# Patient Record
Sex: MALE | Race: WHITE | NOT HISPANIC OR LATINO | Employment: OTHER | URBAN - METROPOLITAN AREA
[De-identification: names, ages, dates, MRNs, and addresses within clinical notes are randomized per-mention and may not be internally consistent; named-entity substitution may affect disease eponyms.]

---

## 2019-01-15 ENCOUNTER — HOSPITAL ENCOUNTER (EMERGENCY)
Facility: HOSPITAL | Age: 69
Discharge: HOME | End: 2019-01-15
Attending: EMERGENCY MEDICINE | Admitting: EMERGENCY MEDICINE

## 2019-01-15 VITALS
RESPIRATION RATE: 20 BRPM | HEART RATE: 88 BPM | HEIGHT: 70 IN | DIASTOLIC BLOOD PRESSURE: 81 MMHG | OXYGEN SATURATION: 92 % | BODY MASS INDEX: 25.05 KG/M2 | WEIGHT: 175 LBS | SYSTOLIC BLOOD PRESSURE: 129 MMHG | TEMPERATURE: 99.2 F

## 2019-01-15 DIAGNOSIS — N41.1 ACUTE ON CHRONIC PROSTATITIS: Primary | ICD-10-CM

## 2019-01-15 DIAGNOSIS — N41.0 ACUTE ON CHRONIC PROSTATITIS: Primary | ICD-10-CM

## 2019-01-15 DIAGNOSIS — R03.0 ELEVATED BLOOD PRESSURE READING: ICD-10-CM

## 2019-01-15 DIAGNOSIS — R30.0 DYSURIA: ICD-10-CM

## 2019-01-15 LAB
BACTERIA UR QL AUTO: ABNORMAL /HPF
BILIRUB UR QL STRIP: NEGATIVE
BUN BLDA-MCNC: 23 MG/DL (ref 8–26)
CA-I BLDA-SCNC: 1.22 MMOL/L (ref 1.2–1.32)
CHLORIDE BLDA-SCNC: 99 MMOL/L (ref 98–109)
CLARITY UR: ABNORMAL
CO2 BLDA-SCNC: 27 MMOL/L (ref 24–29)
COLOR UR: ABNORMAL
CREAT BLDA-MCNC: 1.3 MG/DL (ref 0.6–1.3)
GLUCOSE BLDC GLUCOMTR-MCNC: 143 MG/DL (ref 70–130)
GLUCOSE UR STRIP-MCNC: NEGATIVE MG/DL
HCT VFR BLDA CALC: 53 % (ref 38–51)
HGB BLDA-MCNC: 18 G/DL (ref 12–17)
HGB UR QL STRIP.AUTO: ABNORMAL
HYALINE CASTS UR QL AUTO: ABNORMAL /LPF
KETONES UR QL STRIP: ABNORMAL
LEUKOCYTE ESTERASE UR QL STRIP.AUTO: ABNORMAL
NITRITE UR QL STRIP: NEGATIVE
PH UR STRIP.AUTO: <=5 [PH] (ref 5–8)
POTASSIUM BLDA-SCNC: 4.2 MMOL/L (ref 3.5–4.9)
PROT UR QL STRIP: ABNORMAL
RBC # UR: ABNORMAL /HPF
REF LAB TEST METHOD: ABNORMAL
SODIUM BLDA-SCNC: 140 MMOL/L (ref 138–146)
SP GR UR STRIP: 1.03 (ref 1–1.03)
SQUAMOUS #/AREA URNS HPF: ABNORMAL /HPF
UROBILINOGEN UR QL STRIP: ABNORMAL
WBC UR QL AUTO: ABNORMAL /HPF

## 2019-01-15 PROCEDURE — 96374 THER/PROPH/DIAG INJ IV PUSH: CPT

## 2019-01-15 PROCEDURE — 96375 TX/PRO/DX INJ NEW DRUG ADDON: CPT

## 2019-01-15 PROCEDURE — 85014 HEMATOCRIT: CPT

## 2019-01-15 PROCEDURE — 87186 SC STD MICRODIL/AGAR DIL: CPT | Performed by: EMERGENCY MEDICINE

## 2019-01-15 PROCEDURE — 99283 EMERGENCY DEPT VISIT LOW MDM: CPT

## 2019-01-15 PROCEDURE — 87077 CULTURE AEROBIC IDENTIFY: CPT | Performed by: EMERGENCY MEDICINE

## 2019-01-15 PROCEDURE — 25010000002 KETOROLAC TROMETHAMINE PER 15 MG: Performed by: EMERGENCY MEDICINE

## 2019-01-15 PROCEDURE — 80047 BASIC METABLC PNL IONIZED CA: CPT

## 2019-01-15 PROCEDURE — 25010000002 CEFTRIAXONE PER 250 MG: Performed by: EMERGENCY MEDICINE

## 2019-01-15 PROCEDURE — 87086 URINE CULTURE/COLONY COUNT: CPT | Performed by: EMERGENCY MEDICINE

## 2019-01-15 PROCEDURE — 81001 URINALYSIS AUTO W/SCOPE: CPT | Performed by: EMERGENCY MEDICINE

## 2019-01-15 RX ORDER — SODIUM CHLORIDE 0.9 % (FLUSH) 0.9 %
10 SYRINGE (ML) INJECTION AS NEEDED
Status: DISCONTINUED | OUTPATIENT
Start: 2019-01-15 | End: 2019-01-15 | Stop reason: HOSPADM

## 2019-01-15 RX ORDER — OMEPRAZOLE 20 MG/1
20 CAPSULE, DELAYED RELEASE ORAL 2 TIMES DAILY
COMMUNITY
End: 2021-12-06 | Stop reason: ALTCHOICE

## 2019-01-15 RX ORDER — TADALAFIL 5 MG/1
5 TABLET ORAL DAILY PRN
COMMUNITY
End: 2021-10-06

## 2019-01-15 RX ORDER — KETOROLAC TROMETHAMINE 15 MG/ML
10 INJECTION, SOLUTION INTRAMUSCULAR; INTRAVENOUS ONCE
Status: COMPLETED | OUTPATIENT
Start: 2019-01-15 | End: 2019-01-15

## 2019-01-15 RX ORDER — CARVEDILOL 3.12 MG/1
3.12 TABLET ORAL 2 TIMES DAILY WITH MEALS
COMMUNITY
End: 2021-10-06 | Stop reason: SDUPTHER

## 2019-01-15 RX ORDER — PHENAZOPYRIDINE HYDROCHLORIDE 100 MG/1
200 TABLET, FILM COATED ORAL ONCE
Status: COMPLETED | OUTPATIENT
Start: 2019-01-15 | End: 2019-01-15

## 2019-01-15 RX ORDER — KETOROLAC TROMETHAMINE 10 MG/1
10 TABLET, FILM COATED ORAL EVERY 6 HOURS PRN
Qty: 20 TABLET | Refills: 0 | Status: SHIPPED | OUTPATIENT
Start: 2019-01-15 | End: 2021-05-28

## 2019-01-15 RX ORDER — TAMSULOSIN HYDROCHLORIDE 0.4 MG/1
0.4 CAPSULE ORAL ONCE
Status: COMPLETED | OUTPATIENT
Start: 2019-01-15 | End: 2019-01-15

## 2019-01-15 RX ORDER — PHENAZOPYRIDINE HYDROCHLORIDE 200 MG/1
200 TABLET, FILM COATED ORAL 3 TIMES DAILY PRN
Qty: 9 TABLET | Refills: 0 | Status: SHIPPED | OUTPATIENT
Start: 2019-01-15 | End: 2021-10-06

## 2019-01-15 RX ORDER — CEFTRIAXONE SODIUM 1 G/50ML
1 INJECTION, SOLUTION INTRAVENOUS ONCE
Status: COMPLETED | OUTPATIENT
Start: 2019-01-15 | End: 2019-01-15

## 2019-01-15 RX ORDER — LISINOPRIL 20 MG/1
20 TABLET ORAL DAILY
COMMUNITY
End: 2021-10-06

## 2019-01-15 RX ORDER — TRAZODONE HYDROCHLORIDE 50 MG/1
100 TABLET ORAL NIGHTLY
COMMUNITY
End: 2021-12-23 | Stop reason: SDUPTHER

## 2019-01-15 RX ORDER — TRAMADOL HYDROCHLORIDE 50 MG/1
100 TABLET ORAL ONCE
Status: COMPLETED | OUTPATIENT
Start: 2019-01-15 | End: 2019-01-15

## 2019-01-15 RX ORDER — ASPIRIN 81 MG/1
81 TABLET ORAL DAILY
COMMUNITY
End: 2021-10-06

## 2019-01-15 RX ORDER — TRAMADOL HYDROCHLORIDE 50 MG/1
100 TABLET ORAL EVERY 6 HOURS PRN
Qty: 10 TABLET | Refills: 0 | Status: SHIPPED | OUTPATIENT
Start: 2019-01-15 | End: 2021-05-28

## 2019-01-15 RX ADMIN — CEFTRIAXONE SODIUM 1 G: 1 INJECTION, SOLUTION INTRAVENOUS at 18:48

## 2019-01-15 RX ADMIN — PHENAZOPYRIDINE HYDROCHLORIDE 200 MG: 100 TABLET ORAL at 18:48

## 2019-01-15 RX ADMIN — TAMSULOSIN HYDROCHLORIDE 0.4 MG: 0.4 CAPSULE ORAL at 18:48

## 2019-01-15 RX ADMIN — KETOROLAC TROMETHAMINE 10 MG: 15 INJECTION, SOLUTION INTRAMUSCULAR; INTRAVENOUS at 18:56

## 2019-01-15 RX ADMIN — TRAMADOL HYDROCHLORIDE 100 MG: 50 TABLET, FILM COATED ORAL at 18:56

## 2019-01-15 NOTE — ED PROVIDER NOTES
Subjective   Mr. Ahmet Mariee is a 68-year-old male presenting to the emergency department with complaints of a suspected prostatitis flare-up. The patient has a history of prostate cancer. He states that he has had prostatitis for 28 years and frequently has flare-ups about 2-3 times per year, but this is the worst one over that entire time span. His most recent flare-up was in August 2018. The current symptoms began last night. He complains of frequency, urgency, dysuria, constant penis pain, and pain in the suprapubic region. Additionally, his pain is worsened by urinating and sitting down. When he urinates, he also has pain in his testicles, peroneum, and down his left leg. He states that he typically relieves symptoms by going to the urologist and taking antibiotics. This morning, he called his urologist and was started on Doxycycline. He had only taken one dose earlier today and called his urologist back because the pain was so intense. As a result, he was told to go to the ED. There are no other acute complaints at this time.        History provided by:  Patient  Urinary Frequency   Severity:  Severe  Onset quality:  Gradual  Duration:  1 day  Timing:  Constant  Progression:  Worsening  Chronicity:  Recurrent  Context:  Hx prostate cancer, prostatitis  Relieved by:  Nothing  Worsened by:  Sitting, urinating  Ineffective treatments:  Doxycycline  Associated symptoms: no diarrhea, no fever, no nausea and no vomiting        Review of Systems   Constitutional: Negative.  Negative for fever.   HENT: Negative.    Respiratory: Negative.    Gastrointestinal: Negative.  Negative for constipation, diarrhea, nausea and vomiting.   Genitourinary: Positive for dysuria, frequency, penile pain, testicular pain and urgency.   Musculoskeletal: Negative.    Skin: Negative.    Neurological: Negative.    Psychiatric/Behavioral: Negative.    All other systems reviewed and are negative.      History reviewed. No pertinent past  medical history.    Allergies   Allergen Reactions   • Sulfa Antibiotics Rash       History reviewed. No pertinent surgical history.    History reviewed. No pertinent family history.    Social History     Socioeconomic History   • Marital status:      Spouse name: Not on file   • Number of children: Not on file   • Years of education: Not on file   • Highest education level: Not on file   Tobacco Use   • Smoking status: Never Smoker   • Smokeless tobacco: Never Used   Substance and Sexual Activity   • Alcohol use: No     Frequency: Never   • Drug use: No   • Sexual activity: Defer         Objective   Physical Exam   Constitutional: He is oriented to person, place, and time. He appears well-developed and well-nourished. No distress.   Cardiovascular: Normal rate, regular rhythm, normal heart sounds and intact distal pulses.   No murmur heard.  Pulmonary/Chest: Effort normal and breath sounds normal. No respiratory distress.   Abdominal: Soft. Bowel sounds are normal. There is tenderness in the suprapubic area. There is no rigidity and no guarding.   Mild tenderness to deep palpation of the suprapubic region.   Musculoskeletal: Normal range of motion.   Neurological: He is alert and oriented to person, place, and time.   Skin: Skin is warm and dry.   Psychiatric: He has a normal mood and affect. His behavior is normal.   Nursing note and vitals reviewed.      Procedures         ED Course  ED Course as of Jan 16 0015 Tue Wilbert 15, 2019   1835 Leuk Esterase, UA: (!) Moderate (2+) [RS]   1835 WBC, UA: (!) Too Numerous to Count [RS]   1835 Bacteria, UA: (!) 3+ [RS]   1924 Patient reports feeling much better.  We'll discharge him home to follow-up with his urologist as scheduled tomorrow.  He is to return to the ER for any worsening concerns.  He is artery on antibiotics and is to continue those.  Patient voices understanding and is happy with the plan.  [RS]      ED Course User Index  [RS] Roni Jacobs MD      Recent Results (from the past 24 hour(s))   Urinalysis With Culture If Indicated - Urine, Clean Catch    Collection Time: 01/15/19  5:11 PM   Result Value Ref Range    Color, UA Dark Yellow (A) Yellow, Straw    Appearance, UA Cloudy (A) Clear    pH, UA <=5.0 5.0 - 8.0    Specific Gravity, UA 1.029 1.001 - 1.030    Glucose, UA Negative Negative    Ketones, UA 15 mg/dL (1+) (A) Negative    Bilirubin, UA Negative Negative    Blood, UA Large (3+) (A) Negative    Protein,  mg/dL (2+) (A) Negative    Leuk Esterase, UA Moderate (2+) (A) Negative    Nitrite, UA Negative Negative    Urobilinogen, UA 1.0 E.U./dL 0.2 - 1.0 E.U./dL   Urinalysis, Microscopic Only - Urine, Clean Catch    Collection Time: 01/15/19  5:11 PM   Result Value Ref Range    RBC, UA 13-20 (A) None Seen, 0-2 /HPF    WBC, UA Too Numerous to Count (A) None Seen, 0-2 /HPF    Bacteria, UA 3+ (A) None Seen, Trace /HPF    Squamous Epithelial Cells, UA 0-2 None Seen, 0-2 /HPF    Hyaline Casts, UA 0-6 0 - 6 /LPF    Methodology Manual Light Microscopy    POC CHEM 8    Collection Time: 01/15/19  6:57 PM   Result Value Ref Range    Glucose 143 (H) 70 - 130 mg/dL    BUN 23 8 - 26 mg/dL    Creatinine 1.30 0.60 - 1.30 mg/dL    Sodium 140 138 - 146 mmol/L    Potassium 4.2 3.5 - 4.9 mmol/L    Chloride 99 98 - 109 mmol/L    Total CO2 27 24 - 29 mmol/L    Hemoglobin 18.0 (H) 12.0 - 17.0 g/dL    Hematocrit 53 (H) 38 - 51 %    Ionized Calcium 1.22 1.20 - 1.32 mmol/L     Note: In addition to lab results from this visit, the labs listed above may include labs taken at another facility or during a different encounter within the last 24 hours. Please correlate lab times with ED admission and discharge times for further clarification of the services performed during this visit.    No orders to display     Vitals:    01/15/19 1704 01/15/19 1710 01/15/19 1921 01/15/19 1944   BP: 140/92   129/81   Pulse: 120   88   Resp: 22 20     Temp: 99.2 °F (37.3 °C)      TempSrc: Oral   "    SpO2: 98%  93% 92%   Weight: 79.4 kg (175 lb)      Height: 177.8 cm (70\")        Medications   cefTRIAXone (ROCEPHIN) IVPB 1 g (0 g Intravenous Stopped 1/15/19 1857)   tamsulosin (FLOMAX) 24 hr capsule 0.4 mg (0.4 mg Oral Given 1/15/19 1848)   phenazopyridine (PYRIDIUM) tablet 200 mg (200 mg Oral Given 1/15/19 1848)   ketorolac (TORADOL) injection 10 mg (10 mg Intravenous Given 1/15/19 1856)   traMADol (ULTRAM) tablet 100 mg (100 mg Oral Given 1/15/19 1856)     ECG/EMG Results (last 24 hours)     ** No results found for the last 24 hours. **                        MDM  Number of Diagnoses or Management Options  Acute on chronic prostatitis:   Dysuria:   Elevated blood pressure reading:      Amount and/or Complexity of Data Reviewed  Clinical lab tests: reviewed  Review and summarize past medical records: yes        Final diagnoses:   Acute on chronic prostatitis   Dysuria   Elevated blood pressure reading       Documentation assistance provided by mónica Le.  Information recorded by the mónica was done at my direction and has been verified and validated by me.     Domingo Le  01/15/19 1913       Roni Jacobs MD  01/16/19 0015    "

## 2019-01-17 LAB — BACTERIA SPEC AEROBE CULT: ABNORMAL

## 2021-05-21 ENCOUNTER — APPOINTMENT (OUTPATIENT)
Dept: PREADMISSION TESTING | Facility: HOSPITAL | Age: 71
End: 2021-05-21

## 2021-05-25 ENCOUNTER — TRANSCRIBE ORDERS (OUTPATIENT)
Dept: LAB | Facility: HOSPITAL | Age: 71
End: 2021-05-25

## 2021-05-25 ENCOUNTER — LAB (OUTPATIENT)
Dept: LAB | Facility: HOSPITAL | Age: 71
End: 2021-05-25

## 2021-05-25 ENCOUNTER — APPOINTMENT (OUTPATIENT)
Dept: PREADMISSION TESTING | Facility: HOSPITAL | Age: 71
End: 2021-05-25

## 2021-05-25 DIAGNOSIS — Z01.812 PRE-OPERATIVE LABORATORY EXAMINATION: ICD-10-CM

## 2021-05-25 DIAGNOSIS — Z01.812 PRE-OPERATIVE LABORATORY EXAMINATION: Primary | ICD-10-CM

## 2021-05-25 LAB
DEPRECATED RDW RBC AUTO: 43.1 FL (ref 37–54)
ERYTHROCYTE [DISTWIDTH] IN BLOOD BY AUTOMATED COUNT: 12.3 % (ref 12.3–15.4)
HCT VFR BLD AUTO: 51 % (ref 37.5–51)
HGB BLD-MCNC: 17.3 G/DL (ref 13–17.7)
MCH RBC QN AUTO: 32.2 PG (ref 26.6–33)
MCHC RBC AUTO-ENTMCNC: 33.9 G/DL (ref 31.5–35.7)
MCV RBC AUTO: 95 FL (ref 79–97)
PLATELET # BLD AUTO: 176 10*3/MM3 (ref 140–450)
PMV BLD AUTO: 11 FL (ref 6–12)
RBC # BLD AUTO: 5.37 10*6/MM3 (ref 4.14–5.8)
SARS-COV-2 RNA PNL SPEC NAA+PROBE: NOT DETECTED
WBC # BLD AUTO: 6.15 10*3/MM3 (ref 3.4–10.8)

## 2021-05-25 PROCEDURE — C9803 HOPD COVID-19 SPEC COLLECT: HCPCS

## 2021-05-25 PROCEDURE — U0004 COV-19 TEST NON-CDC HGH THRU: HCPCS

## 2021-05-25 PROCEDURE — 36415 COLL VENOUS BLD VENIPUNCTURE: CPT

## 2021-05-25 PROCEDURE — 85027 COMPLETE CBC AUTOMATED: CPT

## 2021-05-25 PROCEDURE — 80048 BASIC METABOLIC PNL TOTAL CA: CPT

## 2021-05-26 LAB
ANION GAP SERPL CALCULATED.3IONS-SCNC: 11.1 MMOL/L (ref 5–15)
BUN SERPL-MCNC: 24 MG/DL (ref 8–23)
BUN/CREAT SERPL: 18.8 (ref 7–25)
CALCIUM SPEC-SCNC: 9.9 MG/DL (ref 8.6–10.5)
CHLORIDE SERPL-SCNC: 102 MMOL/L (ref 98–107)
CO2 SERPL-SCNC: 25.9 MMOL/L (ref 22–29)
CREAT SERPL-MCNC: 1.28 MG/DL (ref 0.76–1.27)
GFR SERPL CREATININE-BSD FRML MDRD: 56 ML/MIN/1.73
GLUCOSE SERPL-MCNC: 135 MG/DL (ref 65–99)
POTASSIUM SERPL-SCNC: 4.3 MMOL/L (ref 3.5–5.2)
SODIUM SERPL-SCNC: 139 MMOL/L (ref 136–145)

## 2021-05-28 ENCOUNTER — LAB REQUISITION (OUTPATIENT)
Dept: LAB | Facility: HOSPITAL | Age: 71
End: 2021-05-28

## 2021-05-28 ENCOUNTER — HOSPITAL ENCOUNTER (EMERGENCY)
Facility: HOSPITAL | Age: 71
Discharge: HOME | End: 2021-05-29
Attending: EMERGENCY MEDICINE | Admitting: EMERGENCY MEDICINE

## 2021-05-28 VITALS
RESPIRATION RATE: 18 BRPM | TEMPERATURE: 97.7 F | DIASTOLIC BLOOD PRESSURE: 85 MMHG | SYSTOLIC BLOOD PRESSURE: 122 MMHG | WEIGHT: 178 LBS | OXYGEN SATURATION: 97 % | HEART RATE: 88 BPM | HEIGHT: 70 IN | BODY MASS INDEX: 25.48 KG/M2

## 2021-05-28 DIAGNOSIS — Z87.438 HISTORY OF CHRONIC PROSTATITIS: ICD-10-CM

## 2021-05-28 DIAGNOSIS — N99.89 POSTOPERATIVE URINARY RETENTION: ICD-10-CM

## 2021-05-28 DIAGNOSIS — R33.8 POSTOPERATIVE URINARY RETENTION: ICD-10-CM

## 2021-05-28 DIAGNOSIS — R33.8 ACUTE URINARY RETENTION: Primary | ICD-10-CM

## 2021-05-28 DIAGNOSIS — Z87.19 S/P BILATERAL INGUINAL HERNIORRHAPHY: ICD-10-CM

## 2021-05-28 DIAGNOSIS — Z98.890 S/P BILATERAL INGUINAL HERNIORRHAPHY: ICD-10-CM

## 2021-05-28 DIAGNOSIS — K40.20 BILATERAL INGUINAL HERNIA, WITHOUT OBSTRUCTION OR GANGRENE, NOT SPECIFIED AS RECURRENT: ICD-10-CM

## 2021-05-28 LAB
ALBUMIN SERPL-MCNC: 4.3 G/DL (ref 3.5–5.2)
ALBUMIN/GLOB SERPL: 1.5 G/DL
ALP SERPL-CCNC: 48 U/L (ref 39–117)
ALT SERPL W P-5'-P-CCNC: 27 U/L (ref 1–41)
ANION GAP SERPL CALCULATED.3IONS-SCNC: 14 MMOL/L (ref 5–15)
AST SERPL-CCNC: 22 U/L (ref 1–40)
BASOPHILS # BLD AUTO: 0.01 10*3/MM3 (ref 0–0.2)
BASOPHILS NFR BLD AUTO: 0.1 % (ref 0–1.5)
BILIRUB SERPL-MCNC: 0.6 MG/DL (ref 0–1.2)
BILIRUB UR QL STRIP: NEGATIVE
BUN SERPL-MCNC: 21 MG/DL (ref 8–23)
BUN/CREAT SERPL: 16.7 (ref 7–25)
CALCIUM SPEC-SCNC: 9.1 MG/DL (ref 8.6–10.5)
CHLORIDE SERPL-SCNC: 102 MMOL/L (ref 98–107)
CLARITY UR: CLEAR
CO2 SERPL-SCNC: 22 MMOL/L (ref 22–29)
COLOR UR: YELLOW
CREAT SERPL-MCNC: 1.26 MG/DL (ref 0.76–1.27)
D-LACTATE SERPL-SCNC: 2.2 MMOL/L (ref 0.5–2)
DEPRECATED RDW RBC AUTO: 42.5 FL (ref 37–54)
EOSINOPHIL # BLD AUTO: 0 10*3/MM3 (ref 0–0.4)
EOSINOPHIL NFR BLD AUTO: 0 % (ref 0.3–6.2)
ERYTHROCYTE [DISTWIDTH] IN BLOOD BY AUTOMATED COUNT: 12.1 % (ref 12.3–15.4)
GFR SERPL CREATININE-BSD FRML MDRD: 57 ML/MIN/1.73
GLOBULIN UR ELPH-MCNC: 2.8 GM/DL
GLUCOSE SERPL-MCNC: 135 MG/DL (ref 65–99)
GLUCOSE UR STRIP-MCNC: NEGATIVE MG/DL
HCT VFR BLD AUTO: 47.9 % (ref 37.5–51)
HGB BLD-MCNC: 16.5 G/DL (ref 13–17.7)
HGB UR QL STRIP.AUTO: NEGATIVE
HOLD SPECIMEN: NORMAL
HOLD SPECIMEN: NORMAL
IMM GRANULOCYTES # BLD AUTO: 0.03 10*3/MM3 (ref 0–0.05)
IMM GRANULOCYTES NFR BLD AUTO: 0.3 % (ref 0–0.5)
KETONES UR QL STRIP: ABNORMAL
LEUKOCYTE ESTERASE UR QL STRIP.AUTO: NEGATIVE
LIPASE SERPL-CCNC: 16 U/L (ref 13–60)
LYMPHOCYTES # BLD AUTO: 0.46 10*3/MM3 (ref 0.7–3.1)
LYMPHOCYTES NFR BLD AUTO: 4.1 % (ref 19.6–45.3)
MCH RBC QN AUTO: 32.8 PG (ref 26.6–33)
MCHC RBC AUTO-ENTMCNC: 34.4 G/DL (ref 31.5–35.7)
MCV RBC AUTO: 95.2 FL (ref 79–97)
MONOCYTES # BLD AUTO: 0.39 10*3/MM3 (ref 0.1–0.9)
MONOCYTES NFR BLD AUTO: 3.5 % (ref 5–12)
NEUTROPHILS NFR BLD AUTO: 10.3 10*3/MM3 (ref 1.7–7)
NEUTROPHILS NFR BLD AUTO: 92 % (ref 42.7–76)
NITRITE UR QL STRIP: NEGATIVE
NRBC BLD AUTO-RTO: 0 /100 WBC (ref 0–0.2)
PH UR STRIP.AUTO: <=5 [PH] (ref 5–8)
PLATELET # BLD AUTO: 161 10*3/MM3 (ref 140–450)
PMV BLD AUTO: 10.5 FL (ref 6–12)
POTASSIUM SERPL-SCNC: 4.1 MMOL/L (ref 3.5–5.2)
PROT SERPL-MCNC: 7.1 G/DL (ref 6–8.5)
PROT UR QL STRIP: NEGATIVE
RBC # BLD AUTO: 5.03 10*6/MM3 (ref 4.14–5.8)
SODIUM SERPL-SCNC: 138 MMOL/L (ref 136–145)
SP GR UR STRIP: 1.02 (ref 1–1.03)
UROBILINOGEN UR QL STRIP: ABNORMAL
WBC # BLD AUTO: 11.19 10*3/MM3 (ref 3.4–10.8)
WHOLE BLOOD HOLD SPECIMEN: NORMAL
WHOLE BLOOD HOLD SPECIMEN: NORMAL

## 2021-05-28 PROCEDURE — 80053 COMPREHEN METABOLIC PANEL: CPT

## 2021-05-28 PROCEDURE — 88302 TISSUE EXAM BY PATHOLOGIST: CPT | Performed by: SURGERY

## 2021-05-28 PROCEDURE — 51798 US URINE CAPACITY MEASURE: CPT

## 2021-05-28 PROCEDURE — 83605 ASSAY OF LACTIC ACID: CPT | Performed by: EMERGENCY MEDICINE

## 2021-05-28 PROCEDURE — 99283 EMERGENCY DEPT VISIT LOW MDM: CPT

## 2021-05-28 PROCEDURE — 85025 COMPLETE CBC W/AUTO DIFF WBC: CPT

## 2021-05-28 PROCEDURE — 88304 TISSUE EXAM BY PATHOLOGIST: CPT | Performed by: SURGERY

## 2021-05-28 PROCEDURE — 81003 URINALYSIS AUTO W/O SCOPE: CPT | Performed by: EMERGENCY MEDICINE

## 2021-05-28 PROCEDURE — 51702 INSERT TEMP BLADDER CATH: CPT

## 2021-05-28 PROCEDURE — 83690 ASSAY OF LIPASE: CPT

## 2021-05-28 RX ORDER — SODIUM CHLORIDE 9 MG/ML
10 INJECTION INTRAVENOUS AS NEEDED
Status: DISCONTINUED | OUTPATIENT
Start: 2021-05-28 | End: 2021-05-29 | Stop reason: HOSPADM

## 2021-05-28 RX ORDER — OXYCODONE AND ACETAMINOPHEN 7.5; 325 MG/1; MG/1
1 TABLET ORAL ONCE
Status: COMPLETED | OUTPATIENT
Start: 2021-05-28 | End: 2021-05-29

## 2021-05-28 RX ORDER — ACETAMINOPHEN 500 MG
500 TABLET ORAL ONCE
Status: COMPLETED | OUTPATIENT
Start: 2021-05-28 | End: 2021-05-29

## 2021-05-29 RX ADMIN — ACETAMINOPHEN 500 MG: 500 TABLET, FILM COATED ORAL at 00:33

## 2021-05-29 RX ADMIN — OXYCODONE HYDROCHLORIDE AND ACETAMINOPHEN 1 TABLET: 7.5; 325 TABLET ORAL at 00:32

## 2021-06-01 LAB
CYTO UR: NORMAL
LAB AP CASE REPORT: NORMAL
LAB AP CLINICAL INFORMATION: NORMAL
PATH REPORT.FINAL DX SPEC: NORMAL
PATH REPORT.GROSS SPEC: NORMAL

## 2021-06-10 LAB — HOLD SPECIMEN: NORMAL

## 2021-10-06 ENCOUNTER — OFFICE VISIT (OUTPATIENT)
Dept: FAMILY MEDICINE CLINIC | Facility: CLINIC | Age: 71
End: 2021-10-06

## 2021-10-06 VITALS
HEIGHT: 70 IN | BODY MASS INDEX: 26.03 KG/M2 | HEART RATE: 67 BPM | DIASTOLIC BLOOD PRESSURE: 80 MMHG | OXYGEN SATURATION: 99 % | SYSTOLIC BLOOD PRESSURE: 124 MMHG | TEMPERATURE: 97.5 F | RESPIRATION RATE: 18 BRPM | WEIGHT: 181.8 LBS

## 2021-10-06 DIAGNOSIS — N52.8 OTHER MALE ERECTILE DYSFUNCTION: ICD-10-CM

## 2021-10-06 DIAGNOSIS — K86.89 PANCREATIC INSUFFICIENCY: ICD-10-CM

## 2021-10-06 DIAGNOSIS — Z13.220 SCREENING CHOLESTEROL LEVEL: ICD-10-CM

## 2021-10-06 DIAGNOSIS — N32.81 OAB (OVERACTIVE BLADDER): ICD-10-CM

## 2021-10-06 DIAGNOSIS — C61 PROSTATE CANCER (HCC): ICD-10-CM

## 2021-10-06 DIAGNOSIS — N40.1 BENIGN PROSTATIC HYPERPLASIA WITH LOWER URINARY TRACT SYMPTOMS, SYMPTOM DETAILS UNSPECIFIED: ICD-10-CM

## 2021-10-06 DIAGNOSIS — Q61.02 MULTIPLE RENAL CYSTS: ICD-10-CM

## 2021-10-06 DIAGNOSIS — K21.9 GASTROESOPHAGEAL REFLUX DISEASE WITHOUT ESOPHAGITIS: ICD-10-CM

## 2021-10-06 DIAGNOSIS — I10 PRIMARY HYPERTENSION: Primary | ICD-10-CM

## 2021-10-06 DIAGNOSIS — K44.9 HIATAL HERNIA: ICD-10-CM

## 2021-10-06 PROCEDURE — 80053 COMPREHEN METABOLIC PANEL: CPT | Performed by: STUDENT IN AN ORGANIZED HEALTH CARE EDUCATION/TRAINING PROGRAM

## 2021-10-06 PROCEDURE — 90662 IIV NO PRSV INCREASED AG IM: CPT | Performed by: STUDENT IN AN ORGANIZED HEALTH CARE EDUCATION/TRAINING PROGRAM

## 2021-10-06 PROCEDURE — G0008 ADMIN INFLUENZA VIRUS VAC: HCPCS | Performed by: STUDENT IN AN ORGANIZED HEALTH CARE EDUCATION/TRAINING PROGRAM

## 2021-10-06 PROCEDURE — 99205 OFFICE O/P NEW HI 60 MIN: CPT | Performed by: STUDENT IN AN ORGANIZED HEALTH CARE EDUCATION/TRAINING PROGRAM

## 2021-10-06 PROCEDURE — 80061 LIPID PANEL: CPT | Performed by: STUDENT IN AN ORGANIZED HEALTH CARE EDUCATION/TRAINING PROGRAM

## 2021-10-06 RX ORDER — LISINOPRIL 30 MG/1
TABLET ORAL
COMMUNITY
End: 2021-10-06 | Stop reason: SDUPTHER

## 2021-10-06 RX ORDER — TADALAFIL 5 MG/1
TABLET ORAL
COMMUNITY
End: 2021-10-06 | Stop reason: SDUPTHER

## 2021-10-06 RX ORDER — BILBERRY FRUIT 1000 MG
CAPSULE ORAL
COMMUNITY
End: 2022-07-22

## 2021-10-06 RX ORDER — BACILLUS COAGULANS/INULIN 1B-250 MG
CAPSULE ORAL
COMMUNITY
End: 2021-10-06

## 2021-10-06 RX ORDER — CHLORAL HYDRATE 500 MG
CAPSULE ORAL
COMMUNITY

## 2021-10-06 RX ORDER — CARVEDILOL 3.12 MG/1
TABLET ORAL
COMMUNITY
End: 2021-10-06

## 2021-10-06 RX ORDER — PANCRELIPASE 36000; 180000; 114000 [USP'U]/1; [USP'U]/1; [USP'U]/1
1 CAPSULE, DELAYED RELEASE PELLETS ORAL
COMMUNITY
Start: 2021-09-16 | End: 2022-04-18

## 2021-10-06 RX ORDER — TADALAFIL 5 MG/1
5 TABLET ORAL DAILY
Status: SHIPPED | COMMUNITY
Start: 2021-10-06

## 2021-10-06 RX ORDER — TRAZODONE HYDROCHLORIDE 50 MG/1
TABLET ORAL
COMMUNITY
End: 2021-10-06 | Stop reason: SDUPTHER

## 2021-10-06 RX ORDER — HYDROCHLOROTHIAZIDE 12.5 MG/1
TABLET ORAL
COMMUNITY
Start: 2021-07-22 | End: 2021-12-23 | Stop reason: SDUPTHER

## 2021-10-06 RX ORDER — LISINOPRIL 30 MG/1
TABLET ORAL
COMMUNITY
Start: 2021-08-09 | End: 2021-12-23 | Stop reason: SDUPTHER

## 2021-10-06 RX ORDER — BACILLUS COAGULANS/INULIN 1B-250 MG
CAPSULE ORAL
COMMUNITY
End: 2022-09-26

## 2021-10-06 NOTE — PATIENT INSTRUCTIONS
"Nice to meet you! Please sign records release forms.     Stop carvedilol. BP goal is 130/80. Call if over this blood pressure often.     Flu shot today. Recommend COVID vaccine booster and Shingrix.     https://www.nhlbi.nih.gov/files/docs/public/heart/dash_brief.pdf\">   DASH Eating Plan  DASH stands for Dietary Approaches to Stop Hypertension. The DASH eating plan is a healthy eating plan that has been shown to:  · Reduce high blood pressure (hypertension).  · Reduce your risk for type 2 diabetes, heart disease, and stroke.  · Help with weight loss.  What are tips for following this plan?  Reading food labels  · Check food labels for the amount of salt (sodium) per serving. Choose foods with less than 5 percent of the Daily Value of sodium. Generally, foods with less than 300 milligrams (mg) of sodium per serving fit into this eating plan.  · To find whole grains, look for the word \"whole\" as the first word in the ingredient list.  Shopping  · Buy products labeled as \"low-sodium\" or \"no salt added.\"  · Buy fresh foods. Avoid canned foods and pre-made or frozen meals.  Cooking  · Avoid adding salt when cooking. Use salt-free seasonings or herbs instead of table salt or sea salt. Check with your health care provider or pharmacist before using salt substitutes.  · Do not li foods. Cook foods using healthy methods such as baking, boiling, grilling, roasting, and broiling instead.  · Cook with heart-healthy oils, such as olive, canola, avocado, soybean, or sunflower oil.  Meal planning    · Eat a balanced diet that includes:  ? 4 or more servings of fruits and 4 or more servings of vegetables each day. Try to fill one-half of your plate with fruits and vegetables.  ? 6-8 servings of whole grains each day.  ? Less than 6 oz (170 g) of lean meat, poultry, or fish each day. A 3-oz (85-g) serving of meat is about the same size as a deck of cards. One egg equals 1 oz (28 g).  ? 2-3 servings of low-fat dairy each day. One " serving is 1 cup (237 mL).  ? 1 serving of nuts, seeds, or beans 5 times each week.  ? 2-3 servings of heart-healthy fats. Healthy fats called omega-3 fatty acids are found in foods such as walnuts, flaxseeds, fortified milks, and eggs. These fats are also found in cold-water fish, such as sardines, salmon, and mackerel.  · Limit how much you eat of:  ? Canned or prepackaged foods.  ? Food that is high in trans fat, such as some fried foods.  ? Food that is high in saturated fat, such as fatty meat.  ? Desserts and other sweets, sugary drinks, and other foods with added sugar.  ? Full-fat dairy products.  · Do not salt foods before eating.  · Do not eat more than 4 egg yolks a week.  · Try to eat at least 2 vegetarian meals a week.  · Eat more home-cooked food and less restaurant, buffet, and fast food.    Lifestyle  · When eating at a restaurant, ask that your food be prepared with less salt or no salt, if possible.  · If you drink alcohol:  ? Limit how much you use to:  § 0-1 drink a day for women who are not pregnant.  § 0-2 drinks a day for men.  ? Be aware of how much alcohol is in your drink. In the [place] one drink equals one 12 oz bottle of beer (355 mL), one 5 oz glass of wine (148 mL), or one 1½ oz glass of hard liquor (44 mL).  General information  · Avoid eating more than 2,300 mg of salt a day. If you have hypertension, you may need to reduce your sodium intake to 1,500 mg a day.  · Work with your health care provider to maintain a healthy body weight or to lose weight. Ask what an ideal weight is for you.  · Get at least 30 minutes of exercise that causes your heart to beat faster (aerobic exercise) most days of the week. Activities may include walking, swimming, or biking.  · Work with your health care provider or dietitian to adjust your eating plan to your individual calorie needs.  What foods should I eat?  Fruits  All fresh, dried, or frozen fruit. Canned fruit in natural juice (without added  sugar).  Vegetables  Fresh or frozen vegetables (raw, steamed, roasted, or grilled). Low-sodium or reduced-sodium tomato and vegetable juice. Low-sodium or reduced-sodium tomato sauce and tomato paste. Low-sodium or reduced-sodium canned vegetables.  Grains  Whole-grain or whole-wheat bread. Whole-grain or whole-wheat pasta. Brown rice. Oatmeal. Quinoa. Bulgur. Whole-grain and low-sodium cereals. Gertrude bread. Low-fat, low-sodium crackers. Whole-wheat flour tortillas.  Meats and other proteins  Skinless chicken or turkey. Ground chicken or turkey. Pork with fat trimmed off. Fish and seafood. Egg whites. Dried beans, peas, or lentils. Unsalted nuts, nut butters, and seeds. Unsalted canned beans. Lean cuts of beef with fat trimmed off. Low-sodium, lean precooked or cured meat, such as sausages or meat loaves.  Dairy  Low-fat (1%) or fat-free (skim) milk. Reduced-fat, low-fat, or fat-free cheeses. Nonfat, low-sodium ricotta or cottage cheese. Low-fat or nonfat yogurt. Low-fat, low-sodium cheese.  Fats and oils  Soft margarine without trans fats. Vegetable oil. Reduced-fat, low-fat, or light mayonnaise and salad dressings (reduced-sodium). Canola, safflower, olive, avocado, soybean, and sunflower oils. Avocado.  Seasonings and condiments  Herbs. Spices. Seasoning mixes without salt.  Other foods  Unsalted popcorn and pretzels. Fat-free sweets.  The items listed above may not be a complete list of foods and beverages you can eat. Contact a dietitian for more information.  What foods should I avoid?  Fruits  Canned fruit in a light or heavy syrup. Fried fruit. Fruit in cream or butter sauce.  Vegetables  Creamed or fried vegetables. Vegetables in a cheese sauce. Regular canned vegetables (not low-sodium or reduced-sodium). Regular canned tomato sauce and paste (not low-sodium or reduced-sodium). Regular tomato and vegetable juice (not low-sodium or reduced-sodium). Pickles. Olives.  Grains  Baked goods made with fat, such  as croissants, muffins, or some breads. Dry pasta or rice meal packs.  Meats and other proteins  Fatty cuts of meat. Ribs. Fried meat. Jean Baptiste. Bologna, salami, and other precooked or cured meats, such as sausages or meat loaves. Fat from the back of a pig (fatback). Bratwurst. Salted nuts and seeds. Canned beans with added salt. Canned or smoked fish. Whole eggs or egg yolks. Chicken or turkey with skin.  Dairy  Whole or 2% milk, cream, and half-and-half. Whole or full-fat cream cheese. Whole-fat or sweetened yogurt. Full-fat cheese. Nondairy creamers. Whipped toppings. Processed cheese and cheese spreads.  Fats and oils  Butter. Stick margarine. Lard. Shortening. Ghee. Jean Baptiste fat. Tropical oils, such as coconut, palm kernel, or palm oil.  Seasonings and condiments  Onion salt, garlic salt, seasoned salt, table salt, and sea salt. Worcestershire sauce. Tartar sauce. Barbecue sauce. Teriyaki sauce. Soy sauce, including reduced-sodium. Steak sauce. Canned and packaged gravies. Fish sauce. Oyster sauce. Cocktail sauce. Store-bought horseradish. Ketchup. Mustard. Meat flavorings and tenderizers. Bouillon cubes. Hot sauces. Pre-made or packaged marinades. Pre-made or packaged taco seasonings. Relishes. Regular salad dressings.  Other foods  Salted popcorn and pretzels.  The items listed above may not be a complete list of foods and beverages you should avoid. Contact a dietitian for more information.  Where to find more information  · National Heart, Lung, and Blood [place]: www.nhlbi.nih.gov  · American Heart Association: www.heart.org  · Academy of Nutrition and Dietetics: www.eatright.org  · National Kidney Foundation: www.kidney.org  Summary  · The DASH eating plan is a healthy eating plan that has been shown to reduce high blood pressure (hypertension). It may also reduce your risk for type 2 diabetes, heart disease, and stroke.  · When on the DASH eating plan, aim to eat more fresh fruits and vegetables, whole  grains, lean proteins, low-fat dairy, and heart-healthy fats.  · With the DASH eating plan, you should limit salt (sodium) intake to 2,300 mg a day. If you have hypertension, you may need to reduce your sodium intake to 1,500 mg a day.  · Work with your health care provider or dietitian to adjust your eating plan to your individual calorie needs.  This information is not intended to replace advice given to you by your health care provider. Make sure you discuss any questions you have with your health care provider.  Document Revised: 11/20/2020 Document Reviewed: 11/20/2020  Elsevier Patient Education © 2021 Elsevier Inc.

## 2021-10-06 NOTE — PROGRESS NOTES
New Patient Office Visit      Subjective      Chief Complaint:  Establish Care (est care with new pcp, previous pcp retired, follow up on meds )      History of Present Illness: Ahmet Mariee is a 70 y.o. male who presents for a new patient visit.     Patient is establishing new primary care physician today.  His previous primary care doctor was Dr. Roni Price at [facility].  Patient has past medical history of hypertension, hiatal hernia, GERD, pancreatic insufficiency, BPH with lower urinary tract symptoms, erectile dysfunction overactive bladder and history of low-grade prostate cancer.    Significant history includes emergency surgery for significant hiatal hernia back in 2013 with some necrosis that required partial gastrectomy.  Patient now follows with [facility] for his GI symptoms.    For his low-grade prostate cancer he follows with Dr. Spencer and Dr. Ricci with [facility] urology.    Patient has history of negative cardiac strest test >10 years.  It was found that the cause of his epigastric/chest pain was the hiatal hernia.     Patient's only concern today is some mild intermittent achy pain to the right lower quadrant.  No fevers chills or weight loss.    Past Medical History:   Diagnosis Date   • Cancer (HCC)     prostate, slow growing, monitored by urology, Naveed Spencer MD   • Exocrine pancreatic insufficiency     Monitored by GI Dr. Daniel Sanchez       Past Surgical History:   Procedure Laterality Date   • HERNIA REPAIR  05/2013    hiatal   • INGUINAL HERNIA REPAIR  05/2021    bilateral   • KNEE CARTILAGE SURGERY  2008    left knee meniscus repair   • PARTIAL GASTRECTOMY  05/2013    severe hiatal hernia, emergency surgery    • UMBILICAL HERNIA REPAIR  12/2013   • VASECTOMY  1990       Family History   Problem Relation Age of Onset   • Cancer Mother         uterine cancer   • Hypertension Mother    • Cancer Father         prostate and bone   • Diabetes Father    • Other Sister   "       knee replacement   • Cancer Paternal Grandfather         prostate       Social History     Socioeconomic History   • Marital status:      Spouse name: Not on file   • Number of children: Not on file   • Years of education: Not on file   • Highest education level: Not on file   Tobacco Use   • Smoking status: Never Smoker   • Smokeless tobacco: Never Used   Vaping Use   • Vaping Use: Never used   Substance and Sexual Activity   • Alcohol use: No   • Drug use: No   • Sexual activity: Yes         Current Outpatient Medications:   •  Creon 25741-879144 units capsule delayed-release particles capsule, Take 1 capsule by mouth 3 (Three) Times a Day With Meals., Disp: , Rfl:   •  hydroCHLOROthiazide (HYDRODIURIL) 12.5 MG tablet, , Disp: , Rfl:   •  lisinopril (PRINIVIL,ZESTRIL) 30 MG tablet, , Disp: , Rfl:   •  Mirabegron ER (Myrbetriq) 50 MG tablet sustained-release 24 hour 24 hr tablet, Myrbetriq 50 mg tablet,extended release  Take 1 tablet every day by oral route., Disp: , Rfl:   •  Omega-3 Fatty Acids (fish oil) 1000 MG capsule capsule, Fish Oil, Disp: , Rfl:   •  omeprazole (priLOSEC) 20 MG capsule, Take 20 mg by mouth 2 (two) times a day., Disp: , Rfl:   •  Saw Palmetto 1000 MG capsule, saw palmetto  One twice a day, Disp: , Rfl:   •  tadalafil (Cialis) 5 MG tablet, Take 1 tablet by mouth Daily., Disp: , Rfl:   •  traZODone (DESYREL) 50 MG tablet, Take 100 mg by mouth Every Night., Disp: , Rfl:   •  Bacillus Coagulans-Inulin (Probiotic) 1-250 BILLION-MG capsule, Probiotic  Daily, Disp: , Rfl:     Allergies   Allergen Reactions   • Sulfa Antibiotics Rash       Objective     Physical Exam:  Vital Signs:  /80 (BP Location: Left arm, Patient Position: Sitting, Cuff Size: Adult)   Pulse 67   Temp 97.5 °F (36.4 °C) (Temporal)   Resp 18   Ht 177.8 cm (70\")   Wt 82.5 kg (181 lb 12.8 oz)   SpO2 99%   BMI 26.09 kg/m²      Physical Exam  Constitutional:       General: He is not in acute distress.     " Appearance: He is not ill-appearing.   Eyes:      Extraocular Movements: Extraocular movements intact.   Neck:      Thyroid: No thyromegaly.   Cardiovascular:      Rate and Rhythm: Normal rate and regular rhythm.      Heart sounds: No murmur heard.   Pulmonary:      Effort: Pulmonary effort is normal.      Breath sounds: Normal breath sounds.   Abdominal:      Palpations: Abdomen is soft.  No tenderness to palpation right lower quadrant.  Is multiple surgical incisions.  Lymphadenopathy:      Cervical: No cervical adenopathy.   Skin:     General: Skin is warm and dry.   Neurological:      Mental Status: He is alert.   Psychiatric:         Thought Content: Thought content normal.            Assessment / Plan      Assessment/Plan:   1. Primary hypertension  -Stop carvedilol as was taking daily and BP control  -Continue lisinopril 30 and HCTZ 12.5  - Comprehensive Metabolic Panel    2. Pancreatic insufficiency  -Continue Creon, follows with [facility]    3. Hiatal hernia  -Continue omeprazole 20 mg twice daily, follows with [facility]    4. Gastroesophageal reflux disease without esophagitis  --Continue omeprazole 20 mg twice daily, follows with [facility]    5. Benign prostatic hyperplasia with lower urinary tract symptoms, symptom details unspecified  -Follows with [facility] urology, saw palmetto, tadalafil, Myrbetriq is as needed    6. Other male erectile dysfunction  -Tadalafil    7. Multiple renal cysts  -Follows with urology, per patient they are small and no longer followed    8. OAB (overactive bladder)  -Myrbetriq as needed    9. Prostate cancer (HCC)  -Watchful waiting, follows with urology, PSA scheduled for tomorrow    10. Screening cholesterol level  - Lipid Panel        Follow Up:   Return in about 2 months (around 12/6/2021) for Wellness visit.     I spent 62 minutes caring for Ahmet on this date of service. This time includes time spent by me in the following activities: preparing for the visit, performing a  medically appropriate examination and/or evaluation, counseling and educating the patient/family/caregiver and documenting information in the medical record.  Additional time was taken reviewing the patient's history with the patient given multiple comorbid conditions.     Evaristo Gaffney MD  Family Medicine - [facility]

## 2021-10-07 PROBLEM — N18.31 HYPERTENSIVE HEART AND KIDNEY DISEASE WITHOUT HEART FAILURE AND WITH STAGE 3A CHRONIC KIDNEY DISEASE (HCC): Status: ACTIVE | Noted: 2021-10-07

## 2021-10-07 PROBLEM — I13.10 HYPERTENSIVE HEART AND KIDNEY DISEASE WITHOUT HEART FAILURE AND WITH STAGE 3A CHRONIC KIDNEY DISEASE (HCC): Status: ACTIVE | Noted: 2021-10-07

## 2021-10-07 LAB
ALBUMIN SERPL-MCNC: 4.8 G/DL (ref 3.5–5.2)
ALBUMIN/GLOB SERPL: 1.8 G/DL
ALP SERPL-CCNC: 53 U/L (ref 39–117)
ALT SERPL W P-5'-P-CCNC: 36 U/L (ref 1–41)
ANION GAP SERPL CALCULATED.3IONS-SCNC: 11.9 MMOL/L (ref 5–15)
AST SERPL-CCNC: 32 U/L (ref 1–40)
BILIRUB SERPL-MCNC: 0.5 MG/DL (ref 0–1.2)
BUN SERPL-MCNC: 22 MG/DL (ref 8–23)
BUN/CREAT SERPL: 18 (ref 7–25)
CALCIUM SPEC-SCNC: 10.6 MG/DL (ref 8.6–10.5)
CHLORIDE SERPL-SCNC: 102 MMOL/L (ref 98–107)
CHOLEST SERPL-MCNC: 165 MG/DL (ref 0–200)
CO2 SERPL-SCNC: 27.1 MMOL/L (ref 22–29)
CREAT SERPL-MCNC: 1.22 MG/DL (ref 0.76–1.27)
GFR SERPL CREATININE-BSD FRML MDRD: 59 ML/MIN/1.73
GLOBULIN UR ELPH-MCNC: 2.7 GM/DL
GLUCOSE SERPL-MCNC: 108 MG/DL (ref 65–99)
HDLC SERPL-MCNC: 35 MG/DL (ref 40–60)
LDLC SERPL CALC-MCNC: 92 MG/DL (ref 0–100)
LDLC/HDLC SERPL: 2.45 {RATIO}
POTASSIUM SERPL-SCNC: 5.3 MMOL/L (ref 3.5–5.2)
PROT SERPL-MCNC: 7.5 G/DL (ref 6–8.5)
SODIUM SERPL-SCNC: 141 MMOL/L (ref 136–145)
TRIGL SERPL-MCNC: 221 MG/DL (ref 0–150)
VLDLC SERPL-MCNC: 38 MG/DL (ref 5–40)

## 2021-10-08 ENCOUNTER — TELEPHONE (OUTPATIENT)
Dept: FAMILY MEDICINE CLINIC | Facility: CLINIC | Age: 71
End: 2021-10-08

## 2021-10-08 NOTE — TELEPHONE ENCOUNTER
Letter has already been sent to pool to mail. Maybe dylon or other MA's can show you were to see the letters that have been sent. Click on last labs and it is attached. Not sure if anyone mailed it yet.

## 2021-10-08 NOTE — TELEPHONE ENCOUNTER
Caller: Kodi Ahmet Cordova    Relationship: Self    Best call back number: [phone]     Caller requesting test results: AHMET    What test was performed: LABS    When was the test performed: 10/06/2021      Where was the test performed: AT THE PRACTICE    Additional notes: PLEASE CALL AND ADVISE.  THE PATIENT ALSO WANTS A PAPER COPY MAILED TO HIS HOME.

## 2021-12-06 ENCOUNTER — OFFICE VISIT (OUTPATIENT)
Dept: FAMILY MEDICINE CLINIC | Facility: CLINIC | Age: 71
End: 2021-12-06

## 2021-12-06 VITALS
WEIGHT: 176.6 LBS | BODY MASS INDEX: 25.28 KG/M2 | RESPIRATION RATE: 18 BRPM | SYSTOLIC BLOOD PRESSURE: 112 MMHG | DIASTOLIC BLOOD PRESSURE: 88 MMHG | HEART RATE: 76 BPM | TEMPERATURE: 97.3 F | OXYGEN SATURATION: 99 % | HEIGHT: 70 IN

## 2021-12-06 DIAGNOSIS — K86.89 PANCREATIC INSUFFICIENCY: ICD-10-CM

## 2021-12-06 DIAGNOSIS — K44.9 HIATAL HERNIA: ICD-10-CM

## 2021-12-06 DIAGNOSIS — R73.9 HYPERGLYCEMIA: ICD-10-CM

## 2021-12-06 DIAGNOSIS — N18.31 HYPERTENSIVE HEART AND KIDNEY DISEASE WITHOUT HEART FAILURE AND WITH STAGE 3A CHRONIC KIDNEY DISEASE (HCC): ICD-10-CM

## 2021-12-06 DIAGNOSIS — I13.10 HYPERTENSIVE HEART AND KIDNEY DISEASE WITHOUT HEART FAILURE AND WITH STAGE 3A CHRONIC KIDNEY DISEASE (HCC): ICD-10-CM

## 2021-12-06 DIAGNOSIS — K21.9 GASTROESOPHAGEAL REFLUX DISEASE WITHOUT ESOPHAGITIS: ICD-10-CM

## 2021-12-06 DIAGNOSIS — Q61.02 MULTIPLE RENAL CYSTS: ICD-10-CM

## 2021-12-06 DIAGNOSIS — C61 PROSTATE CANCER (HCC): ICD-10-CM

## 2021-12-06 DIAGNOSIS — N40.1 BENIGN PROSTATIC HYPERPLASIA WITH LOWER URINARY TRACT SYMPTOMS, SYMPTOM DETAILS UNSPECIFIED: ICD-10-CM

## 2021-12-06 DIAGNOSIS — Z00.00 ENCOUNTER FOR ROUTINE ADULT HEALTH EXAMINATION WITHOUT ABNORMAL FINDINGS: Primary | ICD-10-CM

## 2021-12-06 DIAGNOSIS — N52.8 OTHER MALE ERECTILE DYSFUNCTION: ICD-10-CM

## 2021-12-06 DIAGNOSIS — N32.81 OAB (OVERACTIVE BLADDER): ICD-10-CM

## 2021-12-06 PROBLEM — I10 PRIMARY HYPERTENSION: Status: RESOLVED | Noted: 2021-10-06 | Resolved: 2021-12-06

## 2021-12-06 LAB
ALBUMIN SERPL-MCNC: 5 G/DL (ref 3.5–5.2)
ALBUMIN/GLOB SERPL: 1.6 G/DL
ALP SERPL-CCNC: 66 U/L (ref 39–117)
ALT SERPL W P-5'-P-CCNC: 40 U/L (ref 1–41)
ANION GAP SERPL CALCULATED.3IONS-SCNC: 10.9 MMOL/L (ref 5–15)
AST SERPL-CCNC: 27 U/L (ref 1–40)
BILIRUB SERPL-MCNC: 0.4 MG/DL (ref 0–1.2)
BUN SERPL-MCNC: 21 MG/DL (ref 8–23)
BUN/CREAT SERPL: 15 (ref 7–25)
CALCIUM SPEC-SCNC: 10.4 MG/DL (ref 8.6–10.5)
CHLORIDE SERPL-SCNC: 102 MMOL/L (ref 98–107)
CO2 SERPL-SCNC: 28.1 MMOL/L (ref 22–29)
CREAT SERPL-MCNC: 1.4 MG/DL (ref 0.76–1.27)
GFR SERPL CREATININE-BSD FRML MDRD: 50 ML/MIN/1.73
GLOBULIN UR ELPH-MCNC: 3.1 GM/DL
GLUCOSE SERPL-MCNC: 103 MG/DL (ref 65–99)
HBA1C MFR BLD: 5.8 % (ref 4.8–5.6)
POTASSIUM SERPL-SCNC: 4.9 MMOL/L (ref 3.5–5.2)
PROT SERPL-MCNC: 8.1 G/DL (ref 6–8.5)
SODIUM SERPL-SCNC: 141 MMOL/L (ref 136–145)

## 2021-12-06 PROCEDURE — 80053 COMPREHEN METABOLIC PANEL: CPT | Performed by: STUDENT IN AN ORGANIZED HEALTH CARE EDUCATION/TRAINING PROGRAM

## 2021-12-06 PROCEDURE — 83036 HEMOGLOBIN GLYCOSYLATED A1C: CPT | Performed by: STUDENT IN AN ORGANIZED HEALTH CARE EDUCATION/TRAINING PROGRAM

## 2021-12-06 PROCEDURE — 1170F FXNL STATUS ASSESSED: CPT | Performed by: STUDENT IN AN ORGANIZED HEALTH CARE EDUCATION/TRAINING PROGRAM

## 2021-12-06 PROCEDURE — 85025 COMPLETE CBC W/AUTO DIFF WBC: CPT | Performed by: STUDENT IN AN ORGANIZED HEALTH CARE EDUCATION/TRAINING PROGRAM

## 2021-12-06 PROCEDURE — G0439 PPPS, SUBSEQ VISIT: HCPCS | Performed by: STUDENT IN AN ORGANIZED HEALTH CARE EDUCATION/TRAINING PROGRAM

## 2021-12-06 RX ORDER — ESOMEPRAZOLE MAGNESIUM 40 MG/1
CAPSULE, DELAYED RELEASE ORAL
COMMUNITY
Start: 2021-10-16 | End: 2022-12-09 | Stop reason: SDUPTHER

## 2021-12-06 NOTE — PATIENT INSTRUCTIONS
Nice to see you today!  Hope you have a Su Bright.    Please get your tetanus vaccine at a pharmacy (Tdap vaccine)    Check blood pressure.     Goal blood pressure is around or less than 130/80. If not reaching this please contact me.     Increase exercise to 3x/week.    Talk to the front about signing up for online protal access.    Preventive Care 65 Years and Older, Male  Preventive care refers to lifestyle choices and visits with your health care provider that can promote health and wellness. This includes:  · A yearly physical exam. This is also called an annual well check.  · Regular dental and eye exams.  · Immunizations.  · Screening for certain conditions.  · Healthy lifestyle choices, such as diet and exercise.  What can I expect for my preventive care visit?  Physical exam  Your health care provider will check:  · Height and weight. These may be used to calculate body mass index (BMI), which is a measurement that tells if you are at a healthy weight.  · Heart rate and blood pressure.  · Your skin for abnormal spots.  Counseling  Your health care provider may ask you questions about:  · Alcohol, tobacco, and drug use.  · Emotional well-being.  · Home and relationship well-being.  · Sexual activity.  · Eating habits.  · History of falls.  · Memory and ability to understand (cognition).  · Work and work environment.  What immunizations do I need?    Influenza (flu) vaccine  · This is recommended every year.  Tetanus, diphtheria, and pertussis (Tdap) vaccine  · You may need a Td booster every 10 years.  Varicella (chickenpox) vaccine  · You may need this vaccine if you have not already been vaccinated.  Zoster (shingles) vaccine  · You may need this after age 60.  Pneumococcal conjugate (PCV13) vaccine  · One dose is recommended after age 65.  Pneumococcal polysaccharide (PPSV23) vaccine  · One dose is recommended after age 65.  Measles, mumps, and rubella (MMR) vaccine  · You may need at least one dose  of MMR if you were born in 1957 or later. You may also need a second dose.  Meningococcal conjugate (MenACWY) vaccine  · You may need this if you have certain conditions.  Hepatitis A vaccine  · You may need this if you have certain conditions or if you travel or work in places where you may be exposed to hepatitis A.  Hepatitis B vaccine  · You may need this if you have certain conditions or if you travel or work in places where you may be exposed to hepatitis B.  Haemophilus influenzae type b (Hib) vaccine  · You may need this if you have certain conditions.  You may receive vaccines as individual doses or as more than one vaccine together in one shot (combination vaccines). Talk with your health care provider about the risks and benefits of combination vaccines.  What tests do I need?  Blood tests  · Lipid and cholesterol levels. These may be checked every 5 years, or more frequently depending on your overall health.  · Hepatitis C test.  · Hepatitis B test.  Screening  · Lung cancer screening. You may have this screening every year starting at age 55 if you have a 30-pack-year history of smoking and currently smoke or have quit within the past 15 years.  · Colorectal cancer screening. All adults should have this screening starting at age 50 and continuing until age 75. Your health care provider may recommend screening at age 45 if you are at increased risk. You will have tests every 1-10 years, depending on your results and the type of screening test.  · Prostate cancer screening. Recommendations will vary depending on your family history and other risks.  · Diabetes screening. This is done by checking your blood sugar (glucose) after you have not eaten for a while (fasting). You may have this done every 1-3 years.  · Abdominal aortic aneurysm (AAA) screening. You may need this if you are a current or former smoker.  · Sexually transmitted disease (STD) testing.  Follow these instructions at home:  Eating and  drinking  · Eat a diet that includes fresh fruits and vegetables, whole grains, lean protein, and low-fat dairy products. Limit your intake of foods with high amounts of sugar, saturated fats, and salt.  · Take vitamin and mineral supplements as recommended by your health care provider.  · Do not drink alcohol if your health care provider tells you not to drink.  · If you drink alcohol:  ? Limit how much you have to 0-2 drinks a day.  ? Be aware of how much alcohol is in your drink. In the [place] one drink equals one 12 oz bottle of beer (355 mL), one 5 oz glass of wine (148 mL), or one 1½ oz glass of hard liquor (44 mL).  Lifestyle  · Take daily care of your teeth and gums.  · Stay active. Exercise for at least 30 minutes on 5 or more days each week.  · Do not use any products that contain nicotine or tobacco, such as cigarettes, e-cigarettes, and chewing tobacco. If you need help quitting, ask your health care provider.  · If you are sexually active, practice safe sex. Use a condom or other form of protection to prevent STIs (sexually transmitted infections).  · Talk with your health care provider about taking a low-dose aspirin or statin.  What's next?  · Visit your health care provider once a year for a well check visit.  · Ask your health care provider how often you should have your eyes and teeth checked.  · Stay up to date on all vaccines.  This information is not intended to replace advice given to you by your health care provider. Make sure you discuss any questions you have with your health care provider.  Document Revised: 12/12/2019 Document Reviewed: 12/12/2019  Elsevier Patient Education © 2020 [employer]

## 2021-12-06 NOTE — PROGRESS NOTES
The ABCs of the Annual Wellness Visit  Subsequent Medicare Wellness Visit    Chief Complaint   Patient presents with   • Medicare Wellness-subsequent     yearly physical, no concerns, would like referral to for a second opinion to GI regarding EPI     Subjective    History of Present Illness:  Ahmet Mariee is a 71 y.o. male who presents for a Subsequent Medicare Wellness Visit.    The following portions of the patient's history were reviewed and   updated as appropriate: allergies, current medications, past family history, past medical history, past social history, past surgical history and problem list.    Overall he is doing well.  He does want a referral for second opinion regarding his exocrine pancreatic attic insufficiency.  He has intermittent diarrhea some days are better than others.  He sees urology monthly for prostate massage given history of chronic pancreatitis and he also has low-grade prostate cancer and is undergoing watchful waiting.    Patient complains of intermittent achy right lower quadrant pain.  He does have diarrhea but not necessarily associated with the pain.    Compared to one year ago, the patient feels his physical   health is worse.    Compared to one year ago, the patient feels his mental   health is the same.    Recent Hospitalizations:  He was not admitted to the hospital during the last year.       Current Medical Providers:  Patient Care Team:  Evaristo Gaffney MD as PCP - General (Family Medicine)    Outpatient Medications Prior to Visit   Medication Sig Dispense Refill   • Bacillus Coagulans-Inulin (Probiotic) 1-250 BILLION-MG capsule Probiotic   Daily     • Creon 52944-119111 units capsule delayed-release particles capsule Take 1 capsule by mouth 3 (Three) Times a Day With Meals.     • esomeprazole (nexIUM) 40 MG capsule      • hydroCHLOROthiazide (HYDRODIURIL) 12.5 MG tablet      • lisinopril (PRINIVIL,ZESTRIL) 30 MG tablet      • Omega-3 Fatty Acids (fish oil) 1000 MG  "capsule capsule Fish Oil     • Saw Palmetto 1000 MG capsule saw palmetto   One twice a day     • tadalafil (Cialis) 5 MG tablet Take 1 tablet by mouth Daily.     • traZODone (DESYREL) 50 MG tablet Take 100 mg by mouth Every Night.     • triamcinolone (KENALOG) 0.1 % ointment      • Mirabegron ER (Myrbetriq) 50 MG tablet sustained-release 24 hour 24 hr tablet Myrbetriq 50 mg tablet,extended release   Take 1 tablet every day by oral route.     • omeprazole (priLOSEC) 20 MG capsule Take 20 mg by mouth 2 (two) times a day.       No facility-administered medications prior to visit.       No opioid medication identified on active medication list. I have reviewed chart for other potential  high risk medication/s and harmful drug interactions in the elderly.          Aspirin is not on active medication list.  Aspirin use is not indicated based on review of current medical condition/s. Risk of harm outweighs potential benefits.  .    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  ACP discussion was held with the patient during this visit. Patient does not have an advance directive, information provided.          Objective    Vitals:    12/06/21 0913   BP: 112/88   BP Location: Left arm   Patient Position: Sitting   Cuff Size: Adult   Pulse: 76   Resp: 18   Temp: 97.3 °F (36.3 °C)   TempSrc: Temporal   SpO2: 99%   Weight: 80.1 kg (176 lb 9.6 oz)   Height: 177.8 cm (70\")     BMI Readings from Last 1 Encounters:   12/06/21 25.34 kg/m²   BMI is above normal parameters. Recommendations " include: educational material    Does the patient have evidence of cognitive impairment? No    Physical Exam  Constitutional:       General: He is not in acute distress.     Appearance: He is not ill-appearing.   Cardiovascular:      Rate and Rhythm: Normal rate and regular rhythm.   Pulmonary:      Effort: Pulmonary effort is normal.      Breath sounds: Normal breath sounds.   Neurological:      Mental Status: He is alert.   Psychiatric:         Thought Content: Thought content normal.     Abdominal: No masses, multiple surgical port incisions and surgical scar to right lower quadrant.  No tenderness to palpation.    Lab Results   Component Value Date    TRIG 221 (H) 10/06/2021    HDL 35 (L) 10/06/2021    LDL 92 10/06/2021    VLDL 38 10/06/2021            HEALTH RISK ASSESSMENT    Smoking Status:  Social History     Tobacco Use   Smoking Status Never Smoker   Smokeless Tobacco Never Used     Alcohol Consumption:  Social History     Substance and Sexual Activity   Alcohol Use No     Fall Risk Screen:    STEADI Fall Risk Assessment was completed, and patient is at LOW risk for falls.Assessment completed on:12/6/2021    Depression Screening:  PHQ-2/PHQ-9 Depression Screening 12/6/2021   Little interest or pleasure in doing things 0   Feeling down, depressed, or hopeless 0   Total Score 0       Health Habits and Functional and Cognitive Screening:  Functional & Cognitive Status 12/6/2021   Do you have difficulty preparing food and eating? No   Do you have difficulty bathing yourself, getting dressed or grooming yourself? No   Do you have difficulty using the toilet? No   Do you have difficulty moving around from place to place? No   Do you have trouble with steps or getting out of a bed or a chair? No   Current Diet Well Balanced Diet   Dental Exam Up to date   Eye Exam Up to date   Exercise (times per week) 2 times per week   Current Exercises Include Cardiovascular Workout;Yard Work   Do you need help using the  phone?  No   Are you deaf or do you have serious difficulty hearing?  No   Do you need help with transportation? No   Do you need help shopping? No   Do you need help preparing meals?  No   Do you need help with housework?  No   Do you need help with laundry? No   Do you need help taking your medications? No   Do you need help managing money? No   Do you ever drive or ride in a car without wearing a seat belt? No   Have you felt unusual stress, anger or loneliness in the last month? No   Who do you live with? Spouse   If you need help, do you have trouble finding someone available to you? No   Have you been bothered in the last four weeks by sexual problems? No   Do you have difficulty concentrating, remembering or making decisions? No       Age-appropriate Screening Schedule:  Refer to the list below for future screening recommendations based on patient's age, sex and/or medical conditions. Orders for these recommended tests are listed in the plan section. The patient has been provided with a written plan.    Health Maintenance   Topic Date Due   • TDAP/TD VACCINES (1 - Tdap) Never done   • ZOSTER VACCINE (3 of 3) 01/12/2022   • INFLUENZA VACCINE  Completed              Assessment/Plan   CMS Preventative Services Quick Reference  Risk Factors Identified During Encounter  None Identified  The above risks/problems have been discussed with the patient.  Follow up actions/plans if indicated are seen below in the Assessment/Plan Section.  Pertinent information has been shared with the patient in the After Visit Summary.    Diagnoses and all orders for this visit:    1. Encounter for routine adult health examination without abnormal findings (Primary)  -     Comprehensive Metabolic Panel  -     Recommended tetanus vaccine at pharmacy  -Next colonoscopy due 2023, vaccines up-to-date other than Tdap.    2. Hyperglycemia  -    Check Hemoglobin A1c    3. Hypertensive heart and kidney disease without heart failure and with  stage 3a chronic kidney disease (HCC)       -     Continue control of blood pressure with lisinopril 10 mg and HCTZ 12.5 mg       -     Reviewed CMP, stable last visit    4. Pancreatic insufficiency  -     Ambulatory Referral to Gastroenterology for second opinion  -     Continue Creon    5. Other male erectile dysfunction        -     Cialis is primary taken for the lower urinary tract symptoms    6. OAB (overactive bladder)        -      Continue Myrbetriq  7. Multiple renal cysts        -      Follows with urology    8. Hiatal hernia         -       Continue omeprazole    9. Gastroesophageal reflux disease without esophagitis         -       Continue omeprazole , likely necessary to continue this as patient had some significant complications with his hiatal hernia back in 2013 or required partial gastrectomy.  Monitor kidney function.    10.  Prostate cancer         -       Low-grade and watchful waiting with urology.  Per patient PSA slightly downtrending on last check        Follow Up:   Return in about 3 months (around 3/6/2022) for Follow-up, blood pressure .     An After Visit Summary and PPPS were made available to the patient.

## 2021-12-07 ENCOUNTER — TELEPHONE (OUTPATIENT)
Dept: FAMILY MEDICINE CLINIC | Facility: CLINIC | Age: 71
End: 2021-12-07

## 2021-12-07 DIAGNOSIS — N18.31 HYPERTENSIVE HEART AND KIDNEY DISEASE WITHOUT HEART FAILURE AND WITH STAGE 3A CHRONIC KIDNEY DISEASE (HCC): Primary | ICD-10-CM

## 2021-12-07 DIAGNOSIS — I13.10 HYPERTENSIVE HEART AND KIDNEY DISEASE WITHOUT HEART FAILURE AND WITH STAGE 3A CHRONIC KIDNEY DISEASE (HCC): Primary | ICD-10-CM

## 2021-12-07 LAB
BASOPHILS # BLD AUTO: 0.04 10*3/MM3 (ref 0–0.2)
BASOPHILS NFR BLD AUTO: 0.6 % (ref 0–1.5)
DEPRECATED RDW RBC AUTO: 43.4 FL (ref 37–54)
EOSINOPHIL # BLD AUTO: 0.06 10*3/MM3 (ref 0–0.4)
EOSINOPHIL NFR BLD AUTO: 0.9 % (ref 0.3–6.2)
ERYTHROCYTE [DISTWIDTH] IN BLOOD BY AUTOMATED COUNT: 12.6 % (ref 12.3–15.4)
HCT VFR BLD AUTO: 51.5 % (ref 37.5–51)
HGB BLD-MCNC: 18.2 G/DL (ref 13–17.7)
IMM GRANULOCYTES # BLD AUTO: 0.02 10*3/MM3 (ref 0–0.05)
IMM GRANULOCYTES NFR BLD AUTO: 0.3 % (ref 0–0.5)
LYMPHOCYTES # BLD AUTO: 0.85 10*3/MM3 (ref 0.7–3.1)
LYMPHOCYTES NFR BLD AUTO: 12.6 % (ref 19.6–45.3)
MCH RBC QN AUTO: 33.2 PG (ref 26.6–33)
MCHC RBC AUTO-ENTMCNC: 35.3 G/DL (ref 31.5–35.7)
MCV RBC AUTO: 94 FL (ref 79–97)
MONOCYTES # BLD AUTO: 0.67 10*3/MM3 (ref 0.1–0.9)
MONOCYTES NFR BLD AUTO: 9.9 % (ref 5–12)
NEUTROPHILS NFR BLD AUTO: 5.13 10*3/MM3 (ref 1.7–7)
NEUTROPHILS NFR BLD AUTO: 75.7 % (ref 42.7–76)
NRBC BLD AUTO-RTO: 0 /100 WBC (ref 0–0.2)
PLATELET # BLD AUTO: 184 10*3/MM3 (ref 140–450)
PMV BLD AUTO: 11 FL (ref 6–12)
RBC # BLD AUTO: 5.48 10*6/MM3 (ref 4.14–5.8)
WBC NRBC COR # BLD: 6.77 10*3/MM3 (ref 3.4–10.8)

## 2021-12-07 NOTE — TELEPHONE ENCOUNTER
Discussed mild decrease in kidney function and plan for renal u/s, urine and CBC. Instructed to return to the office for testing. Renal u/s will be scheduled.     CBC added to the last lab work so will only need to come in for urine.

## 2021-12-08 DIAGNOSIS — D75.1 POLYCYTHEMIA: Primary | ICD-10-CM

## 2021-12-13 ENCOUNTER — TELEPHONE (OUTPATIENT)
Dept: FAMILY MEDICINE CLINIC | Facility: CLINIC | Age: 71
End: 2021-12-13

## 2021-12-13 NOTE — TELEPHONE ENCOUNTER
Caller: Ahmet Mariee Art    Relationship: Self    Best call back number: [phone] (H)    What is the medical concern/diagnosis: EPI    What specialty or service is being requested: GASTROENTEROLOGIST         Any additional details: PATIENT CALLING TO CHECK THE STATUS OF HIS REFERRAL PLEASE ADVISE

## 2021-12-22 ENCOUNTER — HOSPITAL ENCOUNTER (OUTPATIENT)
Dept: ULTRASOUND IMAGING | Facility: HOSPITAL | Age: 71
Discharge: HOME | End: 2021-12-22
Admitting: STUDENT IN AN ORGANIZED HEALTH CARE EDUCATION/TRAINING PROGRAM

## 2021-12-22 DIAGNOSIS — N18.31 HYPERTENSIVE HEART AND KIDNEY DISEASE WITHOUT HEART FAILURE AND WITH STAGE 3A CHRONIC KIDNEY DISEASE (HCC): ICD-10-CM

## 2021-12-22 DIAGNOSIS — I13.10 HYPERTENSIVE HEART AND KIDNEY DISEASE WITHOUT HEART FAILURE AND WITH STAGE 3A CHRONIC KIDNEY DISEASE (HCC): ICD-10-CM

## 2021-12-22 PROCEDURE — 76775 US EXAM ABDO BACK WALL LIM: CPT

## 2021-12-28 RX ORDER — HYDROCHLOROTHIAZIDE 12.5 MG/1
12.5 TABLET ORAL DAILY
Qty: 90 TABLET | Refills: 0 | Status: SHIPPED | OUTPATIENT
Start: 2021-12-28 | End: 2022-09-26 | Stop reason: SDUPTHER

## 2021-12-28 RX ORDER — LISINOPRIL 30 MG/1
30 TABLET ORAL DAILY
Qty: 90 TABLET | Refills: 0 | Status: SHIPPED | OUTPATIENT
Start: 2021-12-28 | End: 2022-04-08 | Stop reason: SDUPTHER

## 2021-12-28 RX ORDER — TRAZODONE HYDROCHLORIDE 50 MG/1
TABLET ORAL
Qty: 180 TABLET | Refills: 0 | Status: SHIPPED | OUTPATIENT
Start: 2021-12-28 | End: 2022-03-28

## 2021-12-29 NOTE — PROGRESS NOTES
Hi Dr. Steve,     I had a few questions about these ultrasound findings.     If possible, could you call me at my cell at [phone].     Thanks,     Evaristo Gaffney MD  Family Medicine - [facility]

## 2022-01-04 ENCOUNTER — LAB (OUTPATIENT)
Dept: LAB | Facility: HOSPITAL | Age: 72
End: 2022-01-04

## 2022-01-04 DIAGNOSIS — N18.31 HYPERTENSIVE HEART AND KIDNEY DISEASE WITHOUT HEART FAILURE AND WITH STAGE 3A CHRONIC KIDNEY DISEASE: ICD-10-CM

## 2022-01-04 DIAGNOSIS — I13.10 HYPERTENSIVE HEART AND KIDNEY DISEASE WITHOUT HEART FAILURE AND WITH STAGE 3A CHRONIC KIDNEY DISEASE: ICD-10-CM

## 2022-01-04 DIAGNOSIS — D75.1 POLYCYTHEMIA: ICD-10-CM

## 2022-01-04 LAB
ALBUMIN UR-MCNC: 5 MG/DL
BACTERIA UR QL AUTO: NORMAL /HPF
BASOPHILS # BLD AUTO: 0.04 10*3/MM3 (ref 0–0.2)
BASOPHILS NFR BLD AUTO: 0.5 % (ref 0–1.5)
BILIRUB UR QL STRIP: NEGATIVE
CLARITY UR: CLEAR
COLOR UR: YELLOW
CREAT UR-MCNC: 153.4 MG/DL
DEPRECATED RDW RBC AUTO: 41.2 FL (ref 37–54)
EOSINOPHIL # BLD AUTO: 0.08 10*3/MM3 (ref 0–0.4)
EOSINOPHIL NFR BLD AUTO: 1.1 % (ref 0.3–6.2)
ERYTHROCYTE [DISTWIDTH] IN BLOOD BY AUTOMATED COUNT: 12 % (ref 12.3–15.4)
GLUCOSE UR STRIP-MCNC: NEGATIVE MG/DL
HCT VFR BLD AUTO: 53.2 % (ref 37.5–51)
HGB BLD-MCNC: 18.3 G/DL (ref 13–17.7)
HGB UR QL STRIP.AUTO: NEGATIVE
HYALINE CASTS UR QL AUTO: NORMAL /LPF
IMM GRANULOCYTES # BLD AUTO: 0.04 10*3/MM3 (ref 0–0.05)
IMM GRANULOCYTES NFR BLD AUTO: 0.5 % (ref 0–0.5)
KETONES UR QL STRIP: NEGATIVE
LEUKOCYTE ESTERASE UR QL STRIP.AUTO: NEGATIVE
LYMPHOCYTES # BLD AUTO: 1.33 10*3/MM3 (ref 0.7–3.1)
LYMPHOCYTES NFR BLD AUTO: 17.9 % (ref 19.6–45.3)
MCH RBC QN AUTO: 32.2 PG (ref 26.6–33)
MCHC RBC AUTO-ENTMCNC: 34.4 G/DL (ref 31.5–35.7)
MCV RBC AUTO: 93.5 FL (ref 79–97)
MICROALBUMIN/CREAT UR: 32.6 MG/G
MONOCYTES # BLD AUTO: 0.77 10*3/MM3 (ref 0.1–0.9)
MONOCYTES NFR BLD AUTO: 10.4 % (ref 5–12)
NEUTROPHILS NFR BLD AUTO: 5.16 10*3/MM3 (ref 1.7–7)
NEUTROPHILS NFR BLD AUTO: 69.6 % (ref 42.7–76)
NITRITE UR QL STRIP: NEGATIVE
NRBC BLD AUTO-RTO: 0 /100 WBC (ref 0–0.2)
PH UR STRIP.AUTO: <=5 [PH] (ref 5–8)
PLATELET # BLD AUTO: 203 10*3/MM3 (ref 140–450)
PMV BLD AUTO: 10.9 FL (ref 6–12)
PROT UR QL STRIP: ABNORMAL
RBC # BLD AUTO: 5.69 10*6/MM3 (ref 4.14–5.8)
RBC # UR STRIP: NORMAL /HPF
REF LAB TEST METHOD: NORMAL
SP GR UR STRIP: 1.03 (ref 1–1.03)
SQUAMOUS #/AREA URNS HPF: NORMAL /HPF
UROBILINOGEN UR QL STRIP: ABNORMAL
WBC # UR STRIP: NORMAL /HPF
WBC NRBC COR # BLD: 7.42 10*3/MM3 (ref 3.4–10.8)

## 2022-01-04 PROCEDURE — 85025 COMPLETE CBC W/AUTO DIFF WBC: CPT | Performed by: STUDENT IN AN ORGANIZED HEALTH CARE EDUCATION/TRAINING PROGRAM

## 2022-01-04 PROCEDURE — 81001 URINALYSIS AUTO W/SCOPE: CPT | Performed by: STUDENT IN AN ORGANIZED HEALTH CARE EDUCATION/TRAINING PROGRAM

## 2022-01-04 PROCEDURE — 82043 UR ALBUMIN QUANTITATIVE: CPT | Performed by: STUDENT IN AN ORGANIZED HEALTH CARE EDUCATION/TRAINING PROGRAM

## 2022-01-04 PROCEDURE — 82570 ASSAY OF URINE CREATININE: CPT | Performed by: STUDENT IN AN ORGANIZED HEALTH CARE EDUCATION/TRAINING PROGRAM

## 2022-01-06 ENCOUNTER — TELEPHONE (OUTPATIENT)
Dept: FAMILY MEDICINE CLINIC | Facility: CLINIC | Age: 72
End: 2022-01-06

## 2022-01-06 NOTE — TELEPHONE ENCOUNTER
Call patient with recent results.  Minimal microalbuminuria.  He is on an ACE inhibitor.  No evidence of specific causes of his kidney disease likely caused by hypertension over time.  Continue medications.  Renal sound discussed with him no intrinsic renal disease or obstruction.  He does have some cyst but there are no documented septations or solid components and no concerning findings at this time.    As far as his polycythemia he was fasting I want him to come nonfasting the next visit.  Repeat CBC next visit.    Evaristo Gaffney MD  Family Medicine - [facility]

## 2022-02-28 ENCOUNTER — TELEPHONE (OUTPATIENT)
Dept: FAMILY MEDICINE CLINIC | Facility: CLINIC | Age: 72
End: 2022-02-28

## 2022-02-28 NOTE — TELEPHONE ENCOUNTER
Took home covid test and was postive 2/26. Wanted Dr. Loera to know and to see if he recommends any meds

## 2022-03-10 ENCOUNTER — LAB (OUTPATIENT)
Dept: LAB | Facility: HOSPITAL | Age: 72
End: 2022-03-10

## 2022-03-10 ENCOUNTER — OFFICE VISIT (OUTPATIENT)
Dept: FAMILY MEDICINE CLINIC | Facility: CLINIC | Age: 72
End: 2022-03-10

## 2022-03-10 VITALS
HEIGHT: 70 IN | WEIGHT: 181.4 LBS | BODY MASS INDEX: 25.97 KG/M2 | HEART RATE: 80 BPM | DIASTOLIC BLOOD PRESSURE: 89 MMHG | SYSTOLIC BLOOD PRESSURE: 128 MMHG | TEMPERATURE: 98.6 F | OXYGEN SATURATION: 99 % | RESPIRATION RATE: 14 BRPM

## 2022-03-10 DIAGNOSIS — K86.89 PANCREATIC INSUFFICIENCY: ICD-10-CM

## 2022-03-10 DIAGNOSIS — D75.1 POLYCYTHEMIA: Primary | ICD-10-CM

## 2022-03-10 DIAGNOSIS — I13.10 HYPERTENSIVE HEART AND KIDNEY DISEASE WITHOUT HEART FAILURE AND WITH STAGE 3A CHRONIC KIDNEY DISEASE: ICD-10-CM

## 2022-03-10 DIAGNOSIS — N18.31 HYPERTENSIVE HEART AND KIDNEY DISEASE WITHOUT HEART FAILURE AND WITH STAGE 3A CHRONIC KIDNEY DISEASE: ICD-10-CM

## 2022-03-10 DIAGNOSIS — Z23 ENCOUNTER FOR IMMUNIZATION: ICD-10-CM

## 2022-03-10 LAB
ANION GAP SERPL CALCULATED.3IONS-SCNC: 11.5 MMOL/L (ref 5–15)
BASOPHILS # BLD AUTO: 0.04 10*3/MM3 (ref 0–0.2)
BASOPHILS NFR BLD AUTO: 0.5 % (ref 0–1.5)
BUN SERPL-MCNC: 13 MG/DL (ref 8–23)
BUN/CREAT SERPL: 10.9 (ref 7–25)
CALCIUM SPEC-SCNC: 10.3 MG/DL (ref 8.6–10.5)
CHLORIDE SERPL-SCNC: 100 MMOL/L (ref 98–107)
CO2 SERPL-SCNC: 26.5 MMOL/L (ref 22–29)
CREAT SERPL-MCNC: 1.19 MG/DL (ref 0.76–1.27)
DEPRECATED RDW RBC AUTO: 44.9 FL (ref 37–54)
EGFRCR SERPLBLD CKD-EPI 2021: 65.7 ML/MIN/1.73
EOSINOPHIL # BLD AUTO: 0.12 10*3/MM3 (ref 0–0.4)
EOSINOPHIL NFR BLD AUTO: 1.6 % (ref 0.3–6.2)
ERYTHROCYTE [DISTWIDTH] IN BLOOD BY AUTOMATED COUNT: 12.5 % (ref 12.3–15.4)
GLUCOSE SERPL-MCNC: 112 MG/DL (ref 65–99)
HCT VFR BLD AUTO: 53.3 % (ref 37.5–51)
HGB BLD-MCNC: 18.2 G/DL (ref 13–17.7)
IMM GRANULOCYTES # BLD AUTO: 0.05 10*3/MM3 (ref 0–0.05)
IMM GRANULOCYTES NFR BLD AUTO: 0.7 % (ref 0–0.5)
LYMPHOCYTES # BLD AUTO: 1.16 10*3/MM3 (ref 0.7–3.1)
LYMPHOCYTES NFR BLD AUTO: 15.7 % (ref 19.6–45.3)
MCH RBC QN AUTO: 32.7 PG (ref 26.6–33)
MCHC RBC AUTO-ENTMCNC: 34.1 G/DL (ref 31.5–35.7)
MCV RBC AUTO: 95.7 FL (ref 79–97)
MONOCYTES # BLD AUTO: 0.74 10*3/MM3 (ref 0.1–0.9)
MONOCYTES NFR BLD AUTO: 10 % (ref 5–12)
NEUTROPHILS NFR BLD AUTO: 5.26 10*3/MM3 (ref 1.7–7)
NEUTROPHILS NFR BLD AUTO: 71.5 % (ref 42.7–76)
NRBC BLD AUTO-RTO: 0 /100 WBC (ref 0–0.2)
PLATELET # BLD AUTO: 184 10*3/MM3 (ref 140–450)
PMV BLD AUTO: 10.8 FL (ref 6–12)
POTASSIUM SERPL-SCNC: 4.3 MMOL/L (ref 3.5–5.2)
RBC # BLD AUTO: 5.57 10*6/MM3 (ref 4.14–5.8)
SODIUM SERPL-SCNC: 138 MMOL/L (ref 136–145)
WBC NRBC COR # BLD: 7.37 10*3/MM3 (ref 3.4–10.8)

## 2022-03-10 PROCEDURE — 80048 BASIC METABOLIC PNL TOTAL CA: CPT | Performed by: STUDENT IN AN ORGANIZED HEALTH CARE EDUCATION/TRAINING PROGRAM

## 2022-03-10 PROCEDURE — 85025 COMPLETE CBC W/AUTO DIFF WBC: CPT | Performed by: STUDENT IN AN ORGANIZED HEALTH CARE EDUCATION/TRAINING PROGRAM

## 2022-03-10 PROCEDURE — 99214 OFFICE O/P EST MOD 30 MIN: CPT | Performed by: STUDENT IN AN ORGANIZED HEALTH CARE EDUCATION/TRAINING PROGRAM

## 2022-03-10 PROCEDURE — 90471 IMMUNIZATION ADMIN: CPT | Performed by: STUDENT IN AN ORGANIZED HEALTH CARE EDUCATION/TRAINING PROGRAM

## 2022-03-10 PROCEDURE — 90715 TDAP VACCINE 7 YRS/> IM: CPT | Performed by: STUDENT IN AN ORGANIZED HEALTH CARE EDUCATION/TRAINING PROGRAM

## 2022-03-10 RX ORDER — ERYTHROMYCIN 5 MG/G
OINTMENT OPHTHALMIC
COMMUNITY
Start: 2022-01-28 | End: 2022-12-09

## 2022-03-10 RX ORDER — METHENAMINE, SODIUM PHOSPHATE, MONOBASIC, MONOHYDRATE, PHENYL SALICYLATE, METHYLENE BLUE, AND HYOSCYAMINE SULFATE 120; 40.8; 36; 10; .12 MG/1; MG/1; MG/1; MG/1; MG/1
CAPSULE ORAL AS NEEDED
COMMUNITY
End: 2022-07-22

## 2022-03-10 RX ORDER — ATROPINE SULFATE 10 MG/ML
SOLUTION/ DROPS OPHTHALMIC
COMMUNITY
Start: 2022-01-12 | End: 2022-09-26

## 2022-03-10 NOTE — PROGRESS NOTES
Established Patient Office Visit      Subjective      Chief Complaint:  Follow-up (3 month f/u )      History of Present Illness: Ahmet Mariee is a 71 y.o. male who presents for follow-up of blood pressure.     He has no specific complaints today.  His urination is stable.    He denies symptoms of polycythemia.  Patient was fasting and not drink much water before last visit.    Past Medical History:   Diagnosis Date   • Cancer (HCC)     prostate, slow growing, monitored by urology, Naveed Spencer MD   • Exocrine pancreatic insufficiency     Monitored by GI Dr. 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)         Current Outpatient Medications:   •  atropine 1 % ophthalmic solution, , Disp: , Rfl:   •  Bacillus Coagulans-Inulin (Probiotic) 1-250 BILLION-MG capsule, Probiotic  Daily, Disp: , Rfl:   •  Creon 08802-087299 units capsule delayed-release particles capsule, Take 1 capsule by mouth 3 (Three) Times a Day With Meals., Disp: , Rfl:   •  erythromycin (ROMYCIN) 5 MG/GM ophthalmic ointment, , Disp: , Rfl:   •  esomeprazole (nexIUM) 40 MG capsule, , Disp: , Rfl:   •  hydroCHLOROthiazide (HYDRODIURIL) 12.5 MG tablet, Take 1 tablet by mouth Daily., Disp: 90 tablet, Rfl: 0  •  lisinopril (PRINIVIL,ZESTRIL) 30 MG tablet, Take 1 tablet by mouth Daily., Disp: 90 tablet, Rfl: 0  •  Omega-3 Fatty Acids (fish oil) 1000 MG capsule capsule, Fish Oil, Disp: , Rfl:   •  Saw Palmetto 1000 MG capsule, saw palmetto  One twice a day, Disp: , Rfl:  "  •  tadalafil (CIALIS) 5 MG tablet, Take 1 tablet by mouth Daily., Disp: , Rfl:   •  traZODone (DESYREL) 50 MG tablet, Take one to two tablets nightly, Disp: 180 tablet, Rfl: 0  •  triamcinolone (KENALOG) 0.1 % ointment, , Disp: , Rfl:   •  uribel (URO-MP) 118 MG capsule capsule, Every 6 (Six) Hours., Disp: , Rfl:       Objective     Physical Exam:   Vital Signs:   /89   Pulse 80   Temp 98.6 °F (37 °C) (Temporal)   Resp 14   Ht 177.8 cm (70\")   Wt 82.3 kg (181 lb 6.4 oz)   SpO2 99%   BMI 26.03 kg/m²      Physical Exam  Constitutional:       General: He is not in acute distress.     Appearance: He is not ill-appearing.   Cardiovascular:      Rate and Rhythm: Normal rate and regular rhythm.   Pulmonary:      Effort: Pulmonary effort is normal.      Breath sounds: Normal breath sounds.   Neurological:      Mental Status: He is alert.   Psychiatric:         Thought Content: Thought content normal.     Scar to right planter foot.        Assessment / Plan      Assessment/Plan:   Diagnoses and all orders for this visit:    1. Polycythemia (Primary)  -     CBC & Differential  -     Recheck CBC as he is not fasting today.  Seems asymptomatic from polycythemia standpoint no known risk factors that I can pinpoint.    2. 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 Encounter for immunization  -     Tdap Vaccine Greater Than or Equal To 6yo IM    4.  Pancreatic insufficiency        -     plans to see [facility] for second opinion.  Takes Carola currently.    Follow Up:   Return in about 4 months (around 7/10/2022) for Follow-up.      Evaristo Gaffney MD  Family Medicine - [facility] [place]  "

## 2022-03-14 ENCOUNTER — TELEPHONE (OUTPATIENT)
Dept: FAMILY MEDICINE CLINIC | Facility: CLINIC | Age: 72
End: 2022-03-14

## 2022-03-14 DIAGNOSIS — D75.1 POLYCYTHEMIA: Primary | ICD-10-CM

## 2022-03-14 NOTE — TELEPHONE ENCOUNTER
Caller: Ahmet Mariee Art    Relationship: Self    Best call back number: [phone]    What is the best time to reach you: ANY    Who are you requesting to speak with (clinical staff, provider,  specific staff member): DR SCHMIDT    Do you know the name of the person who called: DR SCHMIDT    What was the call regarding: DR SCHMIDT SENT A MESSAGE THRU [employer] ABOUT LAB RESULTS AND WANTING TO REFER TO A SPECIALIST- PATIENT UNABLE TO SEE IT NOW- AND [employer] TEAM UNABLE TO LOCATE- ASKING FOR MESSAGE TO BE SENT AGAIN.    Do you require a callback: YES PLEASE- OR CAN SEND THE MESSAGE AGAIN THROUGH [employer].

## 2022-03-28 RX ORDER — TRAZODONE HYDROCHLORIDE 50 MG/1
TABLET ORAL
Qty: 180 TABLET | Refills: 3 | Status: SHIPPED | OUTPATIENT
Start: 2022-03-28 | End: 2023-02-27 | Stop reason: SDUPTHER

## 2022-04-08 RX ORDER — LISINOPRIL 30 MG/1
30 TABLET ORAL DAILY
Qty: 90 TABLET | Refills: 0 | Status: SHIPPED | OUTPATIENT
Start: 2022-04-08 | End: 2022-04-12

## 2022-04-12 RX ORDER — LISINOPRIL 30 MG/1
TABLET ORAL
Qty: 90 TABLET | Refills: 3 | Status: SHIPPED | OUTPATIENT
Start: 2022-04-12

## 2022-04-14 ENCOUNTER — TELEPHONE (OUTPATIENT)
Dept: FAMILY MEDICINE CLINIC | Facility: CLINIC | Age: 72
End: 2022-04-14

## 2022-04-14 NOTE — TELEPHONE ENCOUNTER
Caller: Kodi Ahmet Art    Relationship: Self    Best call back number: [phone]    Requested Prescriptions:      lisinopril (PRINIVIL,ZESTRIL) 30 MG tablet   3 ordered         Pharmacy where request should be sent: [facility] - [address] [phone] [facility] [phone]      Additional details provided by patient: PATIENT SAID THIS WAS ORIGINALLY SENT TO [facility] BUT SINCE ITS A MAINTENANCE MEDICATION, IT NEEDS TO BE RESENT TO [facility]    Does the patient have less than a 3 day supply:  [] Yes  [x] No    Ivan Godfrey Rep   04/14/22 12:28 EDT

## 2022-04-18 ENCOUNTER — CONSULT (OUTPATIENT)
Dept: ONCOLOGY | Facility: CLINIC | Age: 72
End: 2022-04-18

## 2022-04-18 ENCOUNTER — LAB (OUTPATIENT)
Dept: LAB | Facility: HOSPITAL | Age: 72
End: 2022-04-18

## 2022-04-18 VITALS
OXYGEN SATURATION: 98 % | HEIGHT: 70 IN | BODY MASS INDEX: 26.01 KG/M2 | SYSTOLIC BLOOD PRESSURE: 122 MMHG | HEART RATE: 84 BPM | WEIGHT: 181.7 LBS | RESPIRATION RATE: 16 BRPM | DIASTOLIC BLOOD PRESSURE: 81 MMHG | TEMPERATURE: 96 F

## 2022-04-18 DIAGNOSIS — D75.1 POLYCYTHEMIA: Primary | ICD-10-CM

## 2022-04-18 DIAGNOSIS — D75.1 POLYCYTHEMIA: ICD-10-CM

## 2022-04-18 LAB
ERYTHROCYTE [DISTWIDTH] IN BLOOD BY AUTOMATED COUNT: 13.1 % (ref 12.3–15.4)
HCT VFR BLD AUTO: 50 % (ref 37.5–51)
HGB BLD-MCNC: 16.3 G/DL (ref 13–17.7)
LYMPHOCYTES # BLD AUTO: 1.2 10*3/MM3 (ref 0.7–3.1)
LYMPHOCYTES NFR BLD AUTO: 17.9 % (ref 19.6–45.3)
MCH RBC QN AUTO: 31.2 PG (ref 26.6–33)
MCHC RBC AUTO-ENTMCNC: 32.5 G/DL (ref 31.5–35.7)
MCV RBC AUTO: 95.8 FL (ref 79–97)
MONOCYTES # BLD AUTO: 0.4 10*3/MM3 (ref 0.1–0.9)
MONOCYTES NFR BLD AUTO: 5.4 % (ref 5–12)
NEUTROPHILS NFR BLD AUTO: 5.1 10*3/MM3 (ref 1.7–7)
NEUTROPHILS NFR BLD AUTO: 76.7 % (ref 42.7–76)
PLATELET # BLD AUTO: 168 10*3/MM3 (ref 140–450)
PMV BLD AUTO: 7.8 FL (ref 6–12)
RBC # BLD AUTO: 5.22 10*6/MM3 (ref 4.14–5.8)
WBC NRBC COR # BLD: 6.6 10*3/MM3 (ref 3.4–10.8)

## 2022-04-18 PROCEDURE — 99204 OFFICE O/P NEW MOD 45 MIN: CPT | Performed by: INTERNAL MEDICINE

## 2022-04-18 PROCEDURE — 85025 COMPLETE CBC W/AUTO DIFF WBC: CPT

## 2022-04-18 PROCEDURE — 36415 COLL VENOUS BLD VENIPUNCTURE: CPT

## 2022-04-18 RX ORDER — HYOSCYAMINE SULFATE 0.125 MG
TABLET ORAL
COMMUNITY
Start: 2022-04-08 | End: 2022-12-20 | Stop reason: SDUPTHER

## 2022-04-18 NOTE — PROGRESS NOTES
ID: 71 y.o. year old male from [facility] [id number]    PCP: Evaristo Gaffney MD    REFERRING PHYSICIAN: Evaristo Gaffney MD    Reason for Consultation: Polycythemia    Dear Dr. Gaffney    It is a pleasure to meet Mr. Mariee today.  He is a very pleasant 71-year-old gentleman who presents today for consultation for persistent polycythemia.  He reports that he does snore but he does not have any daytime somnolence.  Otherwise seems to be doing reasonably well.  Denies any issues with headaches.  Patient does not smoke.  Does not use testosterone.        Past Medical History:   Diagnosis Date   • Cancer (HCC)     prostate, slow growing, monitored by urology, Naveed Spencer MD   • Exocrine pancreatic insufficiency     Monitored by GI Dr. Daniel Sanchez   • Hypertension    • Injury of left rotator cuff     partial thickness rotator cuff tear       Past Surgical History:   Procedure Laterality Date   • CYST REMOVAL      corner of right eye   • HERNIA REPAIR  05/2013    hiatal   • INGUINAL HERNIA REPAIR  05/2021    bilateral   • KNEE CARTILAGE SURGERY  2008    left knee meniscus repair   • PARTIAL GASTRECTOMY  05/2013    severe hiatal hernia, emergency surgery    • UMBILICAL HERNIA REPAIR  12/2013   • VASECTOMY  1990       Social History     Socioeconomic History   • Marital status:    Tobacco Use   • Smoking status: Never Smoker   • Smokeless tobacco: Never Used   Vaping Use   • Vaping Use: Never used   Substance and Sexual Activity   • Alcohol use: No   • Drug use: No   • Sexual activity: Yes       Family History   Problem Relation Age of Onset   • Cancer Mother         uterine cancer   • Hypertension Mother    • Cancer Father         prostate and bone   • Diabetes Father    • Other Sister         knee replacement   • Cancer Paternal Grandfather         prostate       Review of Systems:    16 point review of systems was performed and reviewed and scanned into the EMR    Review of Systems - Oncology      Current  Outpatient Medications:   •  atropine 1 % ophthalmic solution, , Disp: , Rfl:   •  Bacillus Coagulans-Inulin (Probiotic) 1-250 BILLION-MG capsule, Probiotic  Daily, Disp: , Rfl:   •  erythromycin (ROMYCIN) 5 MG/GM ophthalmic ointment, , Disp: , Rfl:   •  esomeprazole (nexIUM) 40 MG capsule, , Disp: , Rfl:   •  hydroCHLOROthiazide (HYDRODIURIL) 12.5 MG tablet, Take 1 tablet by mouth Daily., Disp: 90 tablet, Rfl: 0  •  hyoscyamine (ANASPAZ,LEVSIN) 0.125 MG tablet, , Disp: , Rfl:   •  lisinopril (PRINIVIL,ZESTRIL) 30 MG tablet, TAKE 1 TABLET DAILY, Disp: 90 tablet, Rfl: 3  •  Omega-3 Fatty Acids (fish oil) 1000 MG capsule capsule, Fish Oil, Disp: , Rfl:   •  Saw Palmetto 1000 MG capsule, saw palmetto  One twice a day, Disp: , Rfl:   •  tadalafil (CIALIS) 5 MG tablet, Take 1 tablet by mouth Daily., Disp: , Rfl:   •  traZODone (DESYREL) 50 MG tablet, TAKE 1 TO 2 TABLETS NIGHTLY as needed for sleep, Disp: 180 tablet, Rfl: 3  •  triamcinolone (KENALOG) 0.1 % ointment, , Disp: , Rfl:   •  uribel (URO-MP) 118 MG capsule capsule, Every 6 (Six) Hours., Disp: , Rfl:     Pain Medications             traZODone (DESYREL) 50 MG tablet TAKE 1 TO 2 TABLETS NIGHTLY as needed for sleep           Allergies   Allergen Reactions   • Sulfa Antibiotics Rash         ECOG score: 0           Objective     Vitals:    04/18/22 1302   BP: 122/81   Pulse: 84   Resp: 16   Temp: 96 °F (35.6 °C)   SpO2: 98%     Body mass index is 26.07 kg/m².  Body surface area is 2 meters squared.        04/18/22  1302   Weight: 82.4 kg (181 lb 11.2 oz)     Pain Score    04/18/22 1302   PainSc: 0-No pain          Physical Exam    General: well appearing, in no acute distress  HEENT: sclera anicteric, oropharynx clear, neck is supple  Lymphatics: no cervical, supraclavicular, or axillary adenopathy  Cardiovascular: regular rate and rhythm, no murmurs, rubs or gallops  Lungs: clear to auscultation bilaterally  Abdomen: soft, nontender, nondistended.  No palpable  organomegaly  Extremities: no lower extremity edema  Skin: no rashes, lesions, bruising, or petechiae  Msk:  Shows no weakness of the large muscle groups  Psych: Mood is stable        Lab Results   Component Value Date    GLUCOSE 112 (H) 03/10/2022    BUN 22 04/04/2022    CREATININE 1.37 (H) 04/04/2022     04/04/2022    K 4.7 04/04/2022     04/04/2022    CO2 27 04/04/2022    CALCIUM 11.2 (H) 04/04/2022    PROTEINTOT 8.1 (H) 04/04/2022    ALBUMIN 5.3 (H) 04/04/2022    BILITOT 0.6 04/04/2022    ALKPHOS 77 04/04/2022    AST 32 04/04/2022    ALT 62 (H) 04/04/2022       Lab Results   Component Value Date    HGB 19.1 (H) 04/04/2022    HCT 56.1 (H) 04/04/2022    MCV 95 04/04/2022     04/04/2022    WBC 9.79 04/04/2022    NEUTROABS 5.26 03/10/2022    LYMPHSABS 1.16 03/10/2022    MONOSABS 0.74 03/10/2022    EOSABS 0.12 03/10/2022    BASOSABS 0.04 03/10/2022     Lab Results   Component Value Date    HGB 19.1 (H) 04/04/2022    HGB 18.2 (H) 03/10/2022    HGB 18.3 (H) 01/04/2022    HGB 18.2 (H) 12/06/2021    HGB 16.5 05/28/2021       CT Abdomen Pelvis With Contrast    Result Date: 4/15/2022  No pancreatic mass. Hepatic steatosis. Nonspecific 10 mm nodule adjacent to the inferior margin of the right hepatic lobe. Enlarged heterogenous prostate. Sclerotic focus in the right superior pubic ramus is nonspecific. Comparison to prior imaging may be helpful. CRITICAL RESULT: No. COMMUNICATION: Per this written report. Dictated by Ying Gates on 4/15/2022 9:41 AM Signed by Ying Gates on 4/15/2022 9:59 AM    US Renal Bilateral    Result Date: 12/27/2021  Cysts identified on both kidneys. No evidence of obstruction. Enlargement identified of the prostate. The bladder is unremarkable.   D:  12/23/2021 E:  12/23/2021  This report was finalized on 12/27/2021 10:44 AM by Dr. Carrie Steve MD.          Assessment/Plan      1.  Persistent polycythemia.  This is concerning for a polycythemia vera.  His CBC today shows a  hemoglobin over 19 with slight increase over the last 4 months.  He denies any obvious signs of sleep apnea.  Though he does snore.  I will check a JAK2 mutational analysis to see if he does have an underlying myeloproliferative disorder.  If he does then I recommended baby aspirin a day.  If he is positive for JAK2 mutation then I would recommend a phlebotomy schedule to maintain hematocrit less than 45.  The biggest issue is to reduce his stroke risk in the situation.  If he does have secondary polycythemia did he does not need any other intervention other than correcting the underlying cause.        Thank you for allowing me to participate in the care of this patient.    Yours sincerely,    Josephine Cagle MD  [facility]  Hematology and Oncology    Return in (Approximately): 6 weeks    Orders Placed This Encounter   Procedures   • JAK2 V617F Qt Rfx CALR/MPL     Standing Status:   Future     Standing Expiration Date:   4/18/2023     Order Specific Question:   Release to patient     Answer:   Immediate   • CBC & Differential     Standing Status:   Future     Standing Expiration Date:   4/18/2023     Order Specific Question:   Manual Differential     Answer:   No     Order Specific Question:   Release to patient     Answer:   Immediate

## 2022-05-02 LAB
CALR EXON 9 MUT ANL BLD/T: NORMAL
CITATION REF LAB TEST: NORMAL
JAK2 P.V617F BLD/T QL: NORMAL
LAB DIRECTOR NAME PROVIDER: NORMAL
LABORATORY COMMENT REPORT: NORMAL
Lab: NORMAL
MPL GENE MUT TESTED BLD/T: NORMAL
MPL P.W515L+W515K+S505N BLD/T QL: NORMAL
REF LAB TEST METHOD: NORMAL
REF LAB TEST METHOD: NORMAL
REFLEX: NORMAL
SERVICE CMNT-IMP: NORMAL
SPECIMEN PREPARATION: NORMAL

## 2022-06-06 ENCOUNTER — OFFICE VISIT (OUTPATIENT)
Dept: ONCOLOGY | Facility: CLINIC | Age: 72
End: 2022-06-06

## 2022-06-06 VITALS
SYSTOLIC BLOOD PRESSURE: 142 MMHG | HEART RATE: 62 BPM | WEIGHT: 180.5 LBS | TEMPERATURE: 97.1 F | DIASTOLIC BLOOD PRESSURE: 80 MMHG | BODY MASS INDEX: 25.9 KG/M2 | OXYGEN SATURATION: 100 %

## 2022-06-06 DIAGNOSIS — D75.1 POLYCYTHEMIA: Primary | ICD-10-CM

## 2022-06-06 PROCEDURE — 99213 OFFICE O/P EST LOW 20 MIN: CPT | Performed by: INTERNAL MEDICINE

## 2022-06-06 NOTE — PROGRESS NOTES
REASON FOR VISIT: Polycythemia    HISTORY OF PRESENT ILLNESS:   71 y.o.  male presents today for follow-up of his polycythemia.  At our last visit I checked his genetic analysis for JAK2, CALR and MPL.  They were all negative.  He denies any daytime somnolence.  Otherwise seems to be doing well.    Past medical history, social history and family history was reviewed 06/06/22 and unchanged from prior visit.    Review of Systems:    Review of Systems - Oncology         Medications:        Current Outpatient Medications:   •  atropine 1 % ophthalmic solution, , Disp: , Rfl:   •  Bacillus Coagulans-Inulin (Probiotic) 1-250 BILLION-MG capsule, Probiotic  Daily, Disp: , Rfl:   •  erythromycin (ROMYCIN) 5 MG/GM ophthalmic ointment, , Disp: , Rfl:   •  esomeprazole (nexIUM) 40 MG capsule, , Disp: , Rfl:   •  hydroCHLOROthiazide (HYDRODIURIL) 12.5 MG tablet, Take 1 tablet by mouth Daily., Disp: 90 tablet, Rfl: 0  •  hyoscyamine (ANASPAZ,LEVSIN) 0.125 MG tablet, , Disp: , Rfl:   •  lisinopril (PRINIVIL,ZESTRIL) 30 MG tablet, TAKE 1 TABLET DAILY, Disp: 90 tablet, Rfl: 3  •  Omega-3 Fatty Acids (fish oil) 1000 MG capsule capsule, Fish Oil, Disp: , Rfl:   •  Saw Palmetto 1000 MG capsule, saw palmetto  One twice a day, Disp: , Rfl:   •  tadalafil (CIALIS) 5 MG tablet, Take 1 tablet by mouth Daily., Disp: , Rfl:   •  traZODone (DESYREL) 50 MG tablet, TAKE 1 TO 2 TABLETS NIGHTLY as needed for sleep, Disp: 180 tablet, Rfl: 3  •  triamcinolone (KENALOG) 0.1 % ointment, , Disp: , Rfl:   •  uribel (URO-MP) 118 MG capsule capsule, As Needed., Disp: , Rfl:     Pain Medications             traZODone (DESYREL) 50 MG tablet TAKE 1 TO 2 TABLETS NIGHTLY as needed for sleep             ALLERGIES:    Allergies   Allergen Reactions   • Sulfa Antibiotics Rash         Physical Exam    VITAL SIGNS:  /80   Pulse 62   Temp 97.1 °F (36.2 °C)   Wt 81.9 kg (180 lb 8 oz)   SpO2 100%   BMI 25.90 kg/m²                 Wt Readings from Last 3  Encounters:   06/06/22 81.9 kg (180 lb 8 oz)   04/18/22 82.4 kg (181 lb 11.2 oz)   03/10/22 82.3 kg (181 lb 6.4 oz)       Body mass index is 25.9 kg/m². Body surface area is 2 meters squared.       Performance Status: 0    General: well appearing, in no acute distress  HEENT: sclera anicteric, neck is supple  Lymphatics: no cervical, supraclavicular, or axillary adenopathy  Cardiovascular: regular rate and rhythm, no murmurs, rubs or gallops  Lungs: clear to auscultation bilaterally  Abdomen: soft, nontender, nondistended.  No palpable organomegaly  Extremities: no lower extremity edema  Skin: no rashes, lesions, bruising, or petechiae  Msk:  Shows no weakness of the large muscle groups  Psych: Mood is stable        RECENT LABS:    Lab Results   Component Value Date    HGB 16.3 04/18/2022    HCT 50.0 04/18/2022    MCV 95.8 04/18/2022     04/18/2022    WBC 6.60 04/18/2022    NEUTROABS 5.10 04/18/2022    LYMPHSABS 1.20 04/18/2022    MONOSABS 0.40 04/18/2022    EOSABS 0.12 03/10/2022    BASOSABS 0.04 03/10/2022       Lab Results   Component Value Date    GLUCOSE 112 (H) 03/10/2022    BUN 22 04/04/2022    CREATININE 1.37 (H) 04/04/2022     04/04/2022    K 4.7 04/04/2022     04/04/2022    CO2 27 04/04/2022    CALCIUM 11.2 (H) 04/04/2022    PROTEINTOT 8.1 (H) 04/04/2022    ALBUMIN 5.3 (H) 04/04/2022    BILITOT 0.6 04/04/2022    ALKPHOS 77 04/04/2022    AST 32 04/04/2022    ALT 62 (H) 04/04/2022       CT Abdomen Pelvis With Contrast    Result Date: 4/15/2022  No pancreatic mass. Hepatic steatosis. Nonspecific 10 mm nodule adjacent to the inferior margin of the right hepatic lobe. Enlarged heterogenous prostate. Sclerotic focus in the right superior pubic ramus is nonspecific. Comparison to prior imaging may be helpful. CRITICAL RESULT: No. COMMUNICATION: Per this written report. Dictated by Ying Gates on 4/15/2022 9:41 AM Signed by Ying Gates on 4/15/2022 9:59 AM          Assessment/Plan    1.   Persistent polycythemia.  At our last visit I checked his numbers and they were better.  I still suspect he has underlying sleep apnea though he does not show any symptoms of it.  If the polycythemia persists over the next year then I would recommend referral for sleep study.  From my standpoint he does not have a primary polycythemia and does not need any phlebotomies or intervention at this time.        Josephine Cagle MD  [facility] Hematology and Oncology         No orders of the defined types were placed in this encounter.      6/6/2022

## 2022-07-22 ENCOUNTER — OFFICE VISIT (OUTPATIENT)
Dept: FAMILY MEDICINE CLINIC | Facility: CLINIC | Age: 72
End: 2022-07-22

## 2022-07-22 VITALS
BODY MASS INDEX: 25.34 KG/M2 | OXYGEN SATURATION: 98 % | WEIGHT: 177 LBS | RESPIRATION RATE: 18 BRPM | TEMPERATURE: 97.8 F | HEART RATE: 85 BPM | HEIGHT: 70 IN | SYSTOLIC BLOOD PRESSURE: 124 MMHG | DIASTOLIC BLOOD PRESSURE: 82 MMHG

## 2022-07-22 DIAGNOSIS — N41.0 ACUTE PROSTATITIS: ICD-10-CM

## 2022-07-22 DIAGNOSIS — D75.1 POLYCYTHEMIA: ICD-10-CM

## 2022-07-22 PROBLEM — K86.89 PANCREATIC INSUFFICIENCY: Status: RESOLVED | Noted: 2021-10-06 | Resolved: 2022-07-22

## 2022-07-22 PROBLEM — K58.0 IRRITABLE BOWEL SYNDROME WITH DIARRHEA: Status: ACTIVE | Noted: 2022-07-22

## 2022-07-22 LAB
BILIRUB BLD-MCNC: ABNORMAL MG/DL
CLARITY, POC: CLEAR
COLOR UR: ABNORMAL
GLUCOSE UR STRIP-MCNC: NEGATIVE MG/DL
KETONES UR QL: NEGATIVE
LEUKOCYTE EST, POC: ABNORMAL
NITRITE UR-MCNC: NEGATIVE MG/ML
PH UR: 5.5 [PH] (ref 5–8)
PROT UR STRIP-MCNC: ABNORMAL MG/DL
RBC # UR STRIP: NEGATIVE /UL
SP GR UR: 1.02 (ref 1–1.03)
UROBILINOGEN UR QL: NORMAL

## 2022-07-22 PROCEDURE — 87086 URINE CULTURE/COLONY COUNT: CPT | Performed by: STUDENT IN AN ORGANIZED HEALTH CARE EDUCATION/TRAINING PROGRAM

## 2022-07-22 PROCEDURE — 99214 OFFICE O/P EST MOD 30 MIN: CPT | Performed by: STUDENT IN AN ORGANIZED HEALTH CARE EDUCATION/TRAINING PROGRAM

## 2022-07-22 PROCEDURE — 81002 URINALYSIS NONAUTO W/O SCOPE: CPT | Performed by: STUDENT IN AN ORGANIZED HEALTH CARE EDUCATION/TRAINING PROGRAM

## 2022-07-22 RX ORDER — METHENAMINE, SODIUM PHOSPHATE, MONOBASIC, MONOHYDRATE, PHENYL SALICYLATE, METHYLENE BLUE, AND HYOSCYAMINE SULFATE 120; 40.8; 36; 10; .12 MG/1; MG/1; MG/1; MG/1; MG/1
1 CAPSULE ORAL 4 TIMES DAILY
Qty: 30 CAPSULE | Refills: 2 | Status: SHIPPED | OUTPATIENT
Start: 2022-07-22

## 2022-07-22 RX ORDER — BILBERRY FRUIT 1000 MG
CAPSULE ORAL
COMMUNITY

## 2022-07-22 NOTE — PROGRESS NOTES
Established Patient Office Visit      Subjective      Chief Complaint:  polycythemia (4 mth f/u)      History of Present Illness: Ahmet Mariee is a 71 y.o. male who presents for 4-day history of burning with urination mild suprapubic pain.  Tenderness with defecation.  Patient feels he has prostatitis.    Patient saw his urologist slightly before the symptoms started and was not symptomatic at that time.    Patient is started taking Uribel which provide some relief.  No fevers or chills.      Past Medical History:   Diagnosis Date   • Cancer (HCC)     prostate, slow growing, monitored by urology, Naveed Spencer MD   • Exocrine pancreatic insufficiency     Monitored by GI Dr. 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)   • Polycythemia   • Irritable bowel syndrome with diarrhea         Current Outpatient Medications:   •  atropine 1 % ophthalmic solution, , Disp: , Rfl:   •  Bacillus Coagulans-Inulin (Probiotic) 1-250 BILLION-MG capsule, Probiotic  Daily, Disp: , Rfl:   •  erythromycin (ROMYCIN) 5 MG/GM ophthalmic ointment, , Disp: , Rfl:   •  esomeprazole (nexIUM) 40 MG capsule, , Disp: , Rfl:   •  hydroCHLOROthiazide (HYDRODIURIL) 12.5 MG tablet, Take 1 tablet by mouth Daily., Disp: 90 tablet, Rfl: 0  •  hyoscyamine (ANASPAZ,LEVSIN) 0.125 MG tablet, , Disp: , Rfl:   •  lisinopril (PRINIVIL,ZESTRIL) 30 MG tablet, TAKE 1 TABLET DAILY, Disp: 90 tablet, Rfl: 3  •  Omega-3 Fatty Acids (fish oil) 1000 MG capsule capsule, Fish Oil, Disp: , Rfl:  "  •  Saw Palmetto 1000 MG capsule, Take  by mouth., Disp: , Rfl:   •  tadalafil (CIALIS) 5 MG tablet, Take 1 tablet by mouth Daily., Disp: , Rfl:   •  traZODone (DESYREL) 50 MG tablet, TAKE 1 TO 2 TABLETS NIGHTLY as needed for sleep, Disp: 180 tablet, Rfl: 3  •  triamcinolone (KENALOG) 0.1 % ointment, , Disp: , Rfl:   •  uribel (URO-MP) 118 MG capsule capsule, Take 1 capsule by mouth 4 (Four) Times a Day., Disp: 30 capsule, Rfl: 2      Objective     Physical Exam:   Vital Signs:   /82   Pulse 85   Temp 97.8 °F (36.6 °C)   Resp 18   Ht 177.8 cm (70\")   Wt 80.3 kg (177 lb)   SpO2 98%   BMI 25.40 kg/m²      Physical Exam  Constitutional:       General: He is not in acute distress.     Appearance: He is not ill-appearing.   Enlarged tender prostate on татьяна. Mild suprapubic tendrness         Assessment / Plan      Assessment/Plan:   Diagnoses and all orders for this visit:    1. Acute prostatitis  -     POC Urinalysis Dipstick  -     Urine Culture - Urine, Urine, Clean Catch  -     uribel (URO-MP) 118 MG capsule capsule; Take 1 capsule by mouth 4 (Four) Times a Day.  Dispense: 30 capsule; Refill: 2    2. Polycythemia      UA with trace leukocytes otherwise difficult interpret given he is taking Uribel.  Urine culture ordered.  I reached out to the office staff of Dr. Spencer after patient had left.  They have prescribed doxycycline twice daily for 21 days.  Continue to Uribel as needed.  Do not take at the same time as the hyoscyamine.     I reviewed last hematology note which showed no concern for percent and polycythemia.  Patient asymptomatic.  CBC normal last in April.  Talon mutation negative.  It is felt that he does not have persistent polycythemia.  Repeat CBC in December.      Follow Up:   No follow-ups on file.    3 data reviewed.  2 test ordered.  Prescription drug management.    Evaristo Gaffney MD  Family Medicine - [facility]  "

## 2022-07-24 LAB — BACTERIA SPEC AEROBE CULT: NO GROWTH

## 2022-09-26 ENCOUNTER — OFFICE VISIT (OUTPATIENT)
Dept: FAMILY MEDICINE CLINIC | Facility: CLINIC | Age: 72
End: 2022-09-26

## 2022-09-26 VITALS
DIASTOLIC BLOOD PRESSURE: 80 MMHG | RESPIRATION RATE: 18 BRPM | HEIGHT: 70 IN | OXYGEN SATURATION: 99 % | SYSTOLIC BLOOD PRESSURE: 128 MMHG | TEMPERATURE: 98.2 F | WEIGHT: 180.8 LBS | HEART RATE: 69 BPM | BODY MASS INDEX: 25.88 KG/M2

## 2022-09-26 DIAGNOSIS — I13.10 HYPERTENSIVE HEART AND KIDNEY DISEASE WITHOUT HEART FAILURE AND WITH STAGE 3A CHRONIC KIDNEY DISEASE: ICD-10-CM

## 2022-09-26 DIAGNOSIS — Z23 ENCOUNTER FOR IMMUNIZATION: Primary | ICD-10-CM

## 2022-09-26 DIAGNOSIS — R22.2 NODULE OF SKIN OF ABDOMEN: ICD-10-CM

## 2022-09-26 DIAGNOSIS — K76.89 LIVER NODULE: ICD-10-CM

## 2022-09-26 DIAGNOSIS — N18.31 HYPERTENSIVE HEART AND KIDNEY DISEASE WITHOUT HEART FAILURE AND WITH STAGE 3A CHRONIC KIDNEY DISEASE: ICD-10-CM

## 2022-09-26 DIAGNOSIS — D75.1 POLYCYTHEMIA: ICD-10-CM

## 2022-09-26 PROCEDURE — 99214 OFFICE O/P EST MOD 30 MIN: CPT | Performed by: STUDENT IN AN ORGANIZED HEALTH CARE EDUCATION/TRAINING PROGRAM

## 2022-09-26 PROCEDURE — G0008 ADMIN INFLUENZA VIRUS VAC: HCPCS | Performed by: STUDENT IN AN ORGANIZED HEALTH CARE EDUCATION/TRAINING PROGRAM

## 2022-09-26 PROCEDURE — 90662 IIV NO PRSV INCREASED AG IM: CPT | Performed by: STUDENT IN AN ORGANIZED HEALTH CARE EDUCATION/TRAINING PROGRAM

## 2022-09-26 RX ORDER — DOXYCYCLINE 100 MG/1
CAPSULE ORAL
COMMUNITY
Start: 2022-07-22 | End: 2022-12-09

## 2022-09-26 RX ORDER — HYDROCHLOROTHIAZIDE 12.5 MG/1
12.5 TABLET ORAL DAILY
Qty: 90 TABLET | Refills: 3 | Status: SHIPPED | OUTPATIENT
Start: 2022-09-26

## 2022-09-26 RX ORDER — TADALAFIL 20 MG/1
20 TABLET ORAL DAILY
COMMUNITY
Start: 2022-09-08

## 2022-09-26 RX ORDER — DOXYCYCLINE 100 MG/1
TABLET ORAL
COMMUNITY
Start: 2022-09-13 | End: 2022-09-26

## 2022-09-26 NOTE — PROGRESS NOTES
Established Patient Office Visit      Subjective      Chief Complaint:  Mass (Patient has a lump where inguinal hernia surgery was a year ago that he is concerned about. Patient is needing refills of hctz 12.5 for 90 days. Needing a recommendation for a gastroenterologist.)      History of Present Illness: Ahmet Mariee is a 71 y.o. male who presents for follow-up of hypertension.     No side effects of hypertension medications reported.    Patient is no longer swelling to the previous surgical site of hernia repair to the left lower quadrant/suprapubic region.  Present for a couple weeks.  Slightly tender to touch.    Past Medical History:   Diagnosis Date   • Cancer (HCC)     prostate, slow growing, monitored by urology, Naveed Spencer MD   • Exocrine pancreatic insufficiency     Monitored by GI Dr. 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)   • Polycythemia   • Irritable bowel syndrome with diarrhea   • Liver nodule         Current Outpatient Medications:   •  doxycycline (MONODOX) 100 MG capsule, TAKE 1 CAPSULE BY MOUTH TWO TIMES A DAY AS DIRECTED FOR 21 DAYS, Disp: , Rfl:   •  esomeprazole (nexIUM) 40 MG capsule, , Disp: , Rfl:   •  hydroCHLOROthiazide (HYDRODIURIL) 12.5 MG tablet, Take 1 tablet by mouth Daily., Disp: 90 tablet, Rfl: 3  •  hyoscyamine (ANASPAZ,LEVSIN) 0.125 MG tablet, , Disp: , Rfl:   •  lisinopril (PRINIVIL,ZESTRIL) 30 MG tablet, TAKE 1 TABLET DAILY, Disp: 90 tablet, Rfl: 3  •  Omega-3 Fatty Acids (fish oil) 1000 MG  "capsule capsule, Fish Oil, Disp: , Rfl:   •  Saw Palmetto 1000 MG capsule, Take  by mouth., Disp: , Rfl:   •  tadalafil (CIALIS) 20 MG tablet, Take 20 mg by mouth Daily., Disp: , Rfl:   •  tadalafil (CIALIS) 5 MG tablet, Take 1 tablet by mouth Daily., Disp: , Rfl:   •  traZODone (DESYREL) 50 MG tablet, TAKE 1 TO 2 TABLETS NIGHTLY as needed for sleep, Disp: 180 tablet, Rfl: 3  •  triamcinolone (KENALOG) 0.1 % ointment, Apply  topically to the appropriate area as directed 2 (Two) Times a Day for 14 days., Disp: 30 g, Rfl: 0  •  uribel (URO-MP) 118 MG capsule capsule, Take 1 capsule by mouth 4 (Four) Times a Day., Disp: 30 capsule, Rfl: 2  •  erythromycin (ROMYCIN) 5 MG/GM ophthalmic ointment, , Disp: , Rfl:       Objective     Physical Exam:   Vital Signs:   /80 (BP Location: Left arm, Patient Position: Sitting, Cuff Size: Adult)   Pulse 69   Temp 98.2 °F (36.8 °C) (Temporal)   Resp 18   Ht 177.8 cm (70\")   Wt 82 kg (180 lb 12.8 oz)   SpO2 99%   BMI 25.94 kg/m²      Physical Exam  Constitutional:       General: He is not in acute distress.     Appearance: He is not ill-appearing.   Cardiovascular:      Rate and Rhythm: Normal rate and regular rhythm.   Pulmonary:      Effort: Pulmonary effort is normal.      Breath sounds: Normal breath sounds.   Neurological:      Mental Status: He is alert.   Psychiatric:         Thought Content: Thought content normal.   Small subcutaneous nodule at scar site to left suprapubic region.  No surrounding erythema.  No fluctuance.       Assessment / Plan      Assessment/Plan:   Diagnoses and all orders for this visit:    1. Encounter for immunization (Primary)  -     Fluzone High-Dose 65+yrs ([id number])    2. 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  Dispense: 90 tablet; " Refill: 3  -Continue lisinopril    3. Liver nodule  -Patient will follow-up with GI in regards to repeat liver imaging which she is due for given nodule    4. Nodule of skin of abdomen  -Does not appear infectious and could be resolved furuncle versus small cyst /nodule at previous surgical site.  Trial triamcinolone  -     triamcinolone (KENALOG) 0.1 % ointment; Apply  topically to the appropriate area as directed 2 (Two) Times a Day for 14 days.  Dispense: 30 g; Refill: 0    5. Polycythemia  -Negative work-up for hematology and recommend if he has persistent polycythemia they recommend a sleep study for possible nocturnal hypoxia/sleep apnea  -Check CBC next visit        Follow Up:   Return in about 2 months (around 12/7/2022) for reschedule Wellness visit.      MDM:     Evaristo Gaffney MD  Family Medicine - [facility] [place]

## 2022-12-09 ENCOUNTER — OFFICE VISIT (OUTPATIENT)
Dept: FAMILY MEDICINE CLINIC | Facility: CLINIC | Age: 72
End: 2022-12-09

## 2022-12-09 ENCOUNTER — TELEPHONE (OUTPATIENT)
Dept: FAMILY MEDICINE CLINIC | Facility: CLINIC | Age: 72
End: 2022-12-09

## 2022-12-09 ENCOUNTER — LAB (OUTPATIENT)
Dept: LAB | Facility: HOSPITAL | Age: 72
End: 2022-12-09

## 2022-12-09 VITALS
DIASTOLIC BLOOD PRESSURE: 80 MMHG | HEIGHT: 70 IN | BODY MASS INDEX: 25.62 KG/M2 | TEMPERATURE: 97.7 F | RESPIRATION RATE: 18 BRPM | WEIGHT: 179 LBS | HEART RATE: 85 BPM | OXYGEN SATURATION: 98 % | SYSTOLIC BLOOD PRESSURE: 134 MMHG

## 2022-12-09 DIAGNOSIS — C61 PROSTATE CANCER: ICD-10-CM

## 2022-12-09 DIAGNOSIS — M54.32 SCIATICA OF LEFT SIDE: ICD-10-CM

## 2022-12-09 DIAGNOSIS — D75.1 POLYCYTHEMIA: ICD-10-CM

## 2022-12-09 DIAGNOSIS — Z11.59 NEED FOR HEPATITIS C SCREENING TEST: ICD-10-CM

## 2022-12-09 DIAGNOSIS — Z00.00 ENCOUNTER FOR ROUTINE ADULT HEALTH EXAMINATION WITHOUT ABNORMAL FINDINGS: ICD-10-CM

## 2022-12-09 DIAGNOSIS — K44.9 HIATAL HERNIA: ICD-10-CM

## 2022-12-09 DIAGNOSIS — R73.9 HYPERGLYCEMIA: ICD-10-CM

## 2022-12-09 LAB
ALBUMIN SERPL-MCNC: 4.9 G/DL (ref 3.5–5.2)
ALBUMIN/GLOB SERPL: 1.3 G/DL
ALP SERPL-CCNC: 62 U/L (ref 39–117)
ALT SERPL W P-5'-P-CCNC: 51 U/L (ref 1–41)
ANION GAP SERPL CALCULATED.3IONS-SCNC: 10 MMOL/L (ref 5–15)
AST SERPL-CCNC: 40 U/L (ref 1–40)
BASOPHILS # BLD AUTO: 0.05 10*3/MM3 (ref 0–0.2)
BASOPHILS NFR BLD AUTO: 0.7 % (ref 0–1.5)
BILIRUB SERPL-MCNC: 0.6 MG/DL (ref 0–1.2)
BUN SERPL-MCNC: 20 MG/DL (ref 8–23)
BUN/CREAT SERPL: 15.9 (ref 7–25)
CALCIUM SPEC-SCNC: 10.7 MG/DL (ref 8.6–10.5)
CHLORIDE SERPL-SCNC: 101 MMOL/L (ref 98–107)
CHOLEST SERPL-MCNC: 197 MG/DL (ref 0–200)
CO2 SERPL-SCNC: 29 MMOL/L (ref 22–29)
CREAT SERPL-MCNC: 1.26 MG/DL (ref 0.76–1.27)
DEPRECATED RDW RBC AUTO: 43.4 FL (ref 37–54)
EGFRCR SERPLBLD CKD-EPI 2021: 61 ML/MIN/1.73
EOSINOPHIL # BLD AUTO: 0.21 10*3/MM3 (ref 0–0.4)
EOSINOPHIL NFR BLD AUTO: 3.1 % (ref 0.3–6.2)
ERYTHROCYTE [DISTWIDTH] IN BLOOD BY AUTOMATED COUNT: 12.4 % (ref 12.3–15.4)
GLOBULIN UR ELPH-MCNC: 3.7 GM/DL
GLUCOSE SERPL-MCNC: 127 MG/DL (ref 65–99)
HBA1C MFR BLD: 5.8 % (ref 4.8–5.6)
HCT VFR BLD AUTO: 52.9 % (ref 37.5–51)
HCV AB SER DONR QL: NORMAL
HDLC SERPL-MCNC: 37 MG/DL (ref 40–60)
HGB BLD-MCNC: 17.9 G/DL (ref 13–17.7)
IMM GRANULOCYTES # BLD AUTO: 0.03 10*3/MM3 (ref 0–0.05)
IMM GRANULOCYTES NFR BLD AUTO: 0.4 % (ref 0–0.5)
LDLC SERPL CALC-MCNC: 111 MG/DL (ref 0–100)
LDLC/HDLC SERPL: 2.78 {RATIO}
LYMPHOCYTES # BLD AUTO: 1.02 10*3/MM3 (ref 0.7–3.1)
LYMPHOCYTES NFR BLD AUTO: 15.1 % (ref 19.6–45.3)
MCH RBC QN AUTO: 32.3 PG (ref 26.6–33)
MCHC RBC AUTO-ENTMCNC: 33.8 G/DL (ref 31.5–35.7)
MCV RBC AUTO: 95.5 FL (ref 79–97)
MONOCYTES # BLD AUTO: 0.6 10*3/MM3 (ref 0.1–0.9)
MONOCYTES NFR BLD AUTO: 8.9 % (ref 5–12)
NEUTROPHILS NFR BLD AUTO: 4.83 10*3/MM3 (ref 1.7–7)
NEUTROPHILS NFR BLD AUTO: 71.8 % (ref 42.7–76)
NRBC BLD AUTO-RTO: 0 /100 WBC (ref 0–0.2)
PLATELET # BLD AUTO: 193 10*3/MM3 (ref 140–450)
PMV BLD AUTO: 10.9 FL (ref 6–12)
POTASSIUM SERPL-SCNC: 4.8 MMOL/L (ref 3.5–5.2)
PROT SERPL-MCNC: 8.6 G/DL (ref 6–8.5)
RBC # BLD AUTO: 5.54 10*6/MM3 (ref 4.14–5.8)
SODIUM SERPL-SCNC: 140 MMOL/L (ref 136–145)
TRIGL SERPL-MCNC: 286 MG/DL (ref 0–150)
VLDLC SERPL-MCNC: 49 MG/DL (ref 5–40)
WBC NRBC COR # BLD: 6.74 10*3/MM3 (ref 3.4–10.8)

## 2022-12-09 PROCEDURE — 36415 COLL VENOUS BLD VENIPUNCTURE: CPT

## 2022-12-09 PROCEDURE — 86803 HEPATITIS C AB TEST: CPT

## 2022-12-09 PROCEDURE — 80053 COMPREHEN METABOLIC PANEL: CPT | Performed by: STUDENT IN AN ORGANIZED HEALTH CARE EDUCATION/TRAINING PROGRAM

## 2022-12-09 PROCEDURE — 1126F AMNT PAIN NOTED NONE PRSNT: CPT | Performed by: STUDENT IN AN ORGANIZED HEALTH CARE EDUCATION/TRAINING PROGRAM

## 2022-12-09 PROCEDURE — 85025 COMPLETE CBC W/AUTO DIFF WBC: CPT | Performed by: STUDENT IN AN ORGANIZED HEALTH CARE EDUCATION/TRAINING PROGRAM

## 2022-12-09 PROCEDURE — 1159F MED LIST DOCD IN RCRD: CPT | Performed by: STUDENT IN AN ORGANIZED HEALTH CARE EDUCATION/TRAINING PROGRAM

## 2022-12-09 PROCEDURE — G0439 PPPS, SUBSEQ VISIT: HCPCS | Performed by: STUDENT IN AN ORGANIZED HEALTH CARE EDUCATION/TRAINING PROGRAM

## 2022-12-09 PROCEDURE — 80061 LIPID PANEL: CPT | Performed by: STUDENT IN AN ORGANIZED HEALTH CARE EDUCATION/TRAINING PROGRAM

## 2022-12-09 PROCEDURE — 1170F FXNL STATUS ASSESSED: CPT | Performed by: STUDENT IN AN ORGANIZED HEALTH CARE EDUCATION/TRAINING PROGRAM

## 2022-12-09 PROCEDURE — 99397 PER PM REEVAL EST PAT 65+ YR: CPT | Performed by: STUDENT IN AN ORGANIZED HEALTH CARE EDUCATION/TRAINING PROGRAM

## 2022-12-09 PROCEDURE — 83036 HEMOGLOBIN GLYCOSYLATED A1C: CPT | Performed by: STUDENT IN AN ORGANIZED HEALTH CARE EDUCATION/TRAINING PROGRAM

## 2022-12-09 RX ORDER — ESOMEPRAZOLE MAGNESIUM 40 MG/1
40 CAPSULE, DELAYED RELEASE ORAL
Qty: 90 CAPSULE | Refills: 3 | Status: SHIPPED | OUTPATIENT
Start: 2022-12-09

## 2022-12-09 NOTE — ASSESSMENT & PLAN NOTE
History of partial gastrectomy and hiatal hernia repair. Does have some severe symptoms at night. Declines further work-up at this time.

## 2022-12-09 NOTE — PROGRESS NOTES
The ABCs of the Annual Wellness Visit  Subsequent Medicare Wellness Visit    Subjective      Ahmet Mariee is a 72 y.o. male who presents for a Subsequent Medicare Wellness Visit.    Patient doing well other than flair of sciatica to left. Off an on for many years. Going to chiropractor which helps.     The following portions of the patient's history were reviewed and   updated as appropriate: allergies, current medications, past family history, past medical history, past social history, past surgical history and problem list.    Compared to one year ago, the patient feels his physical   health is the same.    Compared to one year ago, the patient feels his mental   health is the same.    Recent Hospitalizations:  He was not admitted to the hospital during the last year.       Current Medical Providers:  Patient Care Team:  Evaristo Gaffney MD as PCP - General (Family Medicine)  Naveed Spencer Jr., MD as Consulting Physician (Urology)    Outpatient Medications Prior to Visit   Medication Sig Dispense Refill   • hydroCHLOROthiazide (HYDRODIURIL) 12.5 MG tablet Take 1 tablet by mouth Daily. 90 tablet 3   • hyoscyamine (ANASPAZ,LEVSIN) 0.125 MG tablet      • lisinopril (PRINIVIL,ZESTRIL) 30 MG tablet TAKE 1 TABLET DAILY 90 tablet 3   • Omega-3 Fatty Acids (fish oil) 1000 MG capsule capsule Fish Oil     • Saw Palmetto 1000 MG capsule Take  by mouth.     • tadalafil (CIALIS) 20 MG tablet Take 20 mg by mouth Daily.     • tadalafil (CIALIS) 5 MG tablet Take 1 tablet by mouth Daily.     • traZODone (DESYREL) 50 MG tablet TAKE 1 TO 2 TABLETS NIGHTLY as needed for sleep 180 tablet 3   • esomeprazole (nexIUM) 40 MG capsule      • uribel (URO-MP) 118 MG capsule capsule Take 1 capsule by mouth 4 (Four) Times a Day. 30 capsule 2   • doxycycline (MONODOX) 100 MG capsule TAKE 1 CAPSULE BY MOUTH TWO TIMES A DAY AS DIRECTED FOR 21 DAYS     • erythromycin (ROMYCIN) 5 MG/GM ophthalmic ointment        No facility-administered  "medications prior to visit.       No opioid medication identified on active medication list. I have reviewed chart for other potential  high risk medication/s and harmful drug interactions in the elderly.          Aspirin is not on active medication list.  Aspirin use is not indicated based on review of current medical condition/s. Risk of harm outweighs potential benefits.  .    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  ACP discussion was held with the patient during this visit. Patient does not have an advance directive, information provided.     Objective    Vitals:    12/09/22 0753   BP: 134/80   Pulse: 85   Resp: 18   Temp: 97.7 °F (36.5 °C)   SpO2: 98%   Weight: 81.2 kg (179 lb)   Height: 177.8 cm (70\")   PainSc: 0-No pain     Estimated body mass index is 25.68 kg/m² as calculated from the following:    Height as of this encounter: 177.8 cm (70\").    Weight as of this encounter: 81.2 kg (179 lb).    BMI is >= 25 and <30. (Overweight) The following options were offered after discussion;: weight loss educational material (shared in after visit summary)      Does the patient have evidence of cognitive impairment?   No            HEALTH RISK ASSESSMENT    Smoking Status:  Social History     Tobacco Use   Smoking Status Never   Smokeless Tobacco Never     Alcohol Consumption:  Social History     Substance and Sexual Activity   Alcohol Use No     Fall Risk Screen:    STEADI Fall Risk Assessment " was completed, and patient is at MODERATE risk for falls. Assessment completed on:9/26/2022    Depression Screening:  PHQ-2/PHQ-9 Depression Screening 12/9/2022   Retired PHQ-9 Total Score -   Retired Total Score -   Little Interest or Pleasure in Doing Things 0-->not at all   Feeling Down, Depressed or Hopeless 0-->not at all   PHQ-9: Brief Depression Severity Measure Score 0       Health Habits and Functional and Cognitive Screening:  Functional & Cognitive Status 12/9/2022   Do you have difficulty preparing food and eating? No   Do you have difficulty bathing yourself, getting dressed or grooming yourself? No   Do you have difficulty using the toilet? No   Do you have difficulty moving around from place to place? No   Do you have trouble with steps or getting out of a bed or a chair? No   Current Diet Well Balanced Diet   Dental Exam Up to date   Eye Exam Up to date   Exercise (times per week) 3 times per week   Current Exercises Include Weightlifting   Do you need help using the phone?  No   Are you deaf or do you have serious difficulty hearing?  No   Do you need help with transportation? No   Do you need help shopping? No   Do you need help preparing meals?  No   Do you need help with housework?  No   Do you need help with laundry? No   Do you need help taking your medications? No   Do you need help managing money? No   Do you ever drive or ride in a car without wearing a seat belt? No   Have you felt unusual stress, anger or loneliness in the last month? -   Who do you live with? -   If you need help, do you have trouble finding someone available to you? -   Have you been bothered in the last four weeks by sexual problems? -   Do you have difficulty concentrating, remembering or making decisions? -       Age-appropriate Screening Schedule:  Refer to the list below for future screening recommendations based on patient's age, sex and/or medical conditions. Orders for these recommended tests are listed in the  plan section. The patient has been provided with a written plan.    Health Maintenance   Topic Date Due   • TDAP/TD VACCINES (2 - Td or Tdap) 03/10/2032   • INFLUENZA VACCINE  Completed   • ZOSTER VACCINE  Completed              Physical Exam  Constitutional:       General: He is not in acute distress.     Appearance: He is not ill-appearing.   Cardiovascular:      Rate and Rhythm: Normal rate and regular rhythm.   Pulmonary:      Effort: Pulmonary effort is normal.      Breath sounds: Normal breath sounds.   Neurological:      Mental Status: He is alert.   Psychiatric:         Thought Content: Thought content normal.       No weakness or weakness or numbness to left leg. Negative straight leg raise. No tenderness to the lumbar spine.     CMS Preventative Services Quick Reference  Risk Factors Identified During Encounter:    Prostate cancer     The above risks/problems have been discussed with the patient.  Pertinent information has been shared with the patient in the After Visit Summary.    Diagnoses and all orders for this visit:    1. Encounter for routine adult health examination without abnormal findings  -     Comprehensive Metabolic Panel  -     Lipid Panel    2. Sciatica of left side  Assessment & Plan:  Chronic intermittent with flair. Lumbar xray given prostate cancer. F/u if not improving and dicussed risk with patient. Handout given     Orders:  -     XR Spine Lumbar 2 or 3 View (In Office)    3. Hyperglycemia  -     Hemoglobin A1c    4. Prostate cancer (HCC)  Overview:  S/p MRI spring of 21 monitoring PSA with Dr Spencer.     Orders:  -     XR Spine Lumbar 2 or 3 View (In Office)    5. Hiatal hernia  Overview:  S/p emmergent repair of entrapped hiatal hernia and partial gastrectomy. 2013    Assessment & Plan:  History of partial gastrectomy and hiatal hernia repair. Does have some severe symptoms at night. Declines further work-up at this time.     Orders:  -     esomeprazole (nexIUM) 40 MG capsule;  Take 1 capsule by mouth Every Morning Before Breakfast.  Dispense: 90 capsule; Refill: 3    6. Need for hepatitis C screening test  -     Hepatitis C antibody; Future    7. Polycythemia  Assessment & Plan:  Previous w/u negative     Orders:  -     CBC & Differential    Declines covid vaccine   Discussed healthy diet and exercise. Hand out given.   Hep C ordered   PSA with urology       Follow Up:   Next Medicare Wellness visit to be scheduled in 1 year.      An After Visit Summary and PPPS were made available to the patient.    Evaristo Gaffney MD  Family Medicine - [facility]

## 2022-12-09 NOTE — ASSESSMENT & PLAN NOTE
Chronic intermittent with flair. Lumbar xray given prostate cancer. F/u if not improving and dicussed risk with patient. Handout given

## 2022-12-09 NOTE — PATIENT INSTRUCTIONS
Medicare Wellness  Personal Prevention Plan of Service     Date of Office Visit:    Encounter Provider:  Evaristo Gaffney MD  Place of Service:  [facility]  Patient Name: Ahmet Mariee  :  1950    As part of the Medicare Wellness portion of your visit today, we are providing you with this personalized preventive plan of services (PPPS). This plan is based upon recommendations of the [place] Preventive Services Task Force (USPSTF) and the Advisory Committee on Immunization Practices (ACIP).    This lists the preventive care services that should be considered, and provides dates of when you are due. Items listed as completed are up-to-date and do not require any further intervention.    Health Maintenance   Topic Date Due    HEPATITIS C SCREENING  Never done    COVID-19 Vaccine (4 - Booster for Pfizer series) 2021    COLORECTAL CANCER SCREENING  2023    ANNUAL WELLNESS VISIT  2023    TDAP/TD VACCINES (2 - Td or Tdap) 03/10/2032    INFLUENZA VACCINE  Completed    Pneumococcal Vaccine 65+  Completed    ZOSTER VACCINE  Completed       Orders Placed This Encounter   Procedures    XR Spine Lumbar 2 or 3 View (In Office)     Order Specific Question:   Reason for Exam:     Answer:   right sciatica, patient has prostate cancer. rule out evidence of lesion     Order Specific Question:   Release to patient     Answer:   Routine Release    Comprehensive Metabolic Panel     Order Specific Question:   Release to patient     Answer:   Routine Release    Lipid Panel    Hemoglobin A1c     Order Specific Question:   Release to patient     Answer:   Routine Release    Hepatitis C antibody     Standing Status:   Future     Number of Occurrences:   1     Standing Expiration Date:   2023     Order Specific Question:   Release to patient     Answer:   Routine Release    CBC Auto Differential    CBC & Differential     Order Specific Question:   Manual Differential      Answer:   No       Return in about 6 months (around 6/9/2023) for Follow-up, htn cancel 12/20 appt .

## 2022-12-09 NOTE — TELEPHONE ENCOUNTER
Caller: Ahmet Mariee    Relationship: Self    Best call back number: [phone]    What medications are you currently taking:   Current Outpatient Medications on File Prior to Visit   Medication Sig Dispense Refill   • esomeprazole (nexIUM) 40 MG capsule Take 1 capsule by mouth Every Morning Before Breakfast. 90 capsule 3   • hydroCHLOROthiazide (HYDRODIURIL) 12.5 MG tablet Take 1 tablet by mouth Daily. 90 tablet 3   • hyoscyamine (ANASPAZ,LEVSIN) 0.125 MG tablet      • lisinopril (PRINIVIL,ZESTRIL) 30 MG tablet TAKE 1 TABLET DAILY 90 tablet 3   • Omega-3 Fatty Acids (fish oil) 1000 MG capsule capsule Fish Oil     • Saw Palmetto 1000 MG capsule Take  by mouth.     • tadalafil (CIALIS) 20 MG tablet Take 20 mg by mouth Daily.     • tadalafil (CIALIS) 5 MG tablet Take 1 tablet by mouth Daily.     • traZODone (DESYREL) 50 MG tablet TAKE 1 TO 2 TABLETS NIGHTLY as needed for sleep 180 tablet 3   • uribel (URO-MP) 118 MG capsule capsule Take 1 capsule by mouth 4 (Four) Times a Day. 30 capsule 2   • [DISCONTINUED] doxycycline (MONODOX) 100 MG capsule TAKE 1 CAPSULE BY MOUTH TWO TIMES A DAY AS DIRECTED FOR 21 DAYS     • [DISCONTINUED] erythromycin (ROMYCIN) 5 MG/GM ophthalmic ointment      • [DISCONTINUED] esomeprazole (nexIUM) 40 MG capsule        No current facility-administered medications on file prior to visit.          When did you start taking these medications:     Which medication are you concerned about: esomeprazole (nexIUM) 40 MG capsule    Who prescribed you this medication:    What are your concerns: PATIENT STATES INSTEAD OF ONE A DAY HE TAKES 2 A DAY OF THESE AND WAS ONLY SENT A 90 PILL PRESCRIPTION BUT HE NEEDS A 180 PILL PRESCRIPTION     How long have you had these concerns:

## 2022-12-13 NOTE — TELEPHONE ENCOUNTER
Patient's previously been on Nexium in 40 mg twice daily given significant reflux especially when lying down and he also had emergency surgery for partial gastrectomy secondary to severe hiatal hernia.    If symptoms worsen on Nexium 40 mg once daily I will increase to Nexium 40 mg twice daily and asked him to call me if symptoms worsen.    Discussed potential risk of PPIs.    Evaristo Gaffney MD  Family Medicine - [facility]

## 2022-12-20 ENCOUNTER — TELEPHONE (OUTPATIENT)
Dept: FAMILY MEDICINE CLINIC | Facility: CLINIC | Age: 72
End: 2022-12-20

## 2022-12-20 DIAGNOSIS — K58.0 IRRITABLE BOWEL SYNDROME WITH DIARRHEA: Primary | ICD-10-CM

## 2022-12-20 RX ORDER — HYOSCYAMINE SULFATE 0.125 MG
0.12 TABLET ORAL EVERY 6 HOURS PRN
Qty: 360 TABLET | Refills: 3 | Status: SHIPPED | OUTPATIENT
Start: 2022-12-20

## 2022-12-20 NOTE — TELEPHONE ENCOUNTER
Caller: Ahmet Mariee    Relationship: Self    Best call back number: [phone]    What medication are you requesting:    hyoscyamine (ANASPAZ,LEVSIN) 0.125 MG tablet   4 PILLS DAILY. 90 DAY SUPPLY. 360 PILLS  ONE WITH EVERY MEAL ONCE BEFORE BED.    What are your current symptoms: IBS DIARRHEA    How long have you been experiencing symptoms: PATIENT HAS BEEN TAKING THIS MEDICATION SINCE July 2022.     Have you had these symptoms before:    [x] Yes  [] No    Have you been treated for these symptoms before:   [x] Yes  [] No    If a prescription is needed, what is your preferred pharmacy and phone number: [id number] - [place], [place] - [address] - [phone] ph - [phone] FX     Additional notes: PATIENT HAS BEEN TAKING THIS MEDICATION BUT IS WANTING DR. JACINTA SCHMIDT  TO PRESCRIBE THIS. PATIENT IS WANTING TO BE ABLE TO GET THIS ASAP BECAUSE HE HAS TO TAKE THIS AFTER MEALS OR HE HAS DIARRHEA. PATIENT'S GASTRO THAT WROTE THIS PRESCRIPTION LEFT SUDDENLY AND THE PLACE HE IS SEEN CANNOT GET HIM IN FOR A LONG TIME. PLEASE CALL WHEN THIS IS SENT IN.

## 2023-02-09 ENCOUNTER — HOSPITAL ENCOUNTER (EMERGENCY)
Facility: HOSPITAL | Age: 73
Discharge: HOME | End: 2023-02-10
Attending: EMERGENCY MEDICINE | Admitting: EMERGENCY MEDICINE
Payer: MEDICARE

## 2023-02-09 ENCOUNTER — APPOINTMENT (OUTPATIENT)
Dept: CT IMAGING | Facility: HOSPITAL | Age: 73
End: 2023-02-09
Payer: MEDICARE

## 2023-02-09 DIAGNOSIS — K44.9 HIATAL HERNIA: ICD-10-CM

## 2023-02-09 DIAGNOSIS — K21.9 GASTROESOPHAGEAL REFLUX DISEASE, UNSPECIFIED WHETHER ESOPHAGITIS PRESENT: Primary | ICD-10-CM

## 2023-02-09 LAB
BACTERIA UR QL AUTO: NORMAL /HPF
BASOPHILS # BLD AUTO: 0.04 10*3/MM3 (ref 0–0.2)
BASOPHILS NFR BLD AUTO: 0.5 % (ref 0–1.5)
BILIRUB UR QL STRIP: NEGATIVE
CLARITY UR: CLEAR
COLOR UR: YELLOW
CREAT BLDA-MCNC: 1.3 MG/DL
CREAT BLDA-MCNC: 1.3 MG/DL (ref 0.6–1.3)
DEPRECATED RDW RBC AUTO: 43 FL (ref 37–54)
EOSINOPHIL # BLD AUTO: 0.25 10*3/MM3 (ref 0–0.4)
EOSINOPHIL NFR BLD AUTO: 2.9 % (ref 0.3–6.2)
ERYTHROCYTE [DISTWIDTH] IN BLOOD BY AUTOMATED COUNT: 12.5 % (ref 12.3–15.4)
GLUCOSE UR STRIP-MCNC: NEGATIVE MG/DL
HCT VFR BLD AUTO: 50.2 % (ref 37.5–51)
HGB BLD-MCNC: 17.5 G/DL (ref 13–17.7)
HGB UR QL STRIP.AUTO: NEGATIVE
HOLD SPECIMEN: NORMAL
HYALINE CASTS UR QL AUTO: NORMAL /LPF
IMM GRANULOCYTES # BLD AUTO: 0.05 10*3/MM3 (ref 0–0.05)
IMM GRANULOCYTES NFR BLD AUTO: 0.6 % (ref 0–0.5)
KETONES UR QL STRIP: ABNORMAL
LEUKOCYTE ESTERASE UR QL STRIP.AUTO: NEGATIVE
LYMPHOCYTES # BLD AUTO: 1.54 10*3/MM3 (ref 0.7–3.1)
LYMPHOCYTES NFR BLD AUTO: 18.1 % (ref 19.6–45.3)
MCH RBC QN AUTO: 33 PG (ref 26.6–33)
MCHC RBC AUTO-ENTMCNC: 34.9 G/DL (ref 31.5–35.7)
MCV RBC AUTO: 94.5 FL (ref 79–97)
MONOCYTES # BLD AUTO: 0.89 10*3/MM3 (ref 0.1–0.9)
MONOCYTES NFR BLD AUTO: 10.5 % (ref 5–12)
NEUTROPHILS NFR BLD AUTO: 5.73 10*3/MM3 (ref 1.7–7)
NEUTROPHILS NFR BLD AUTO: 67.4 % (ref 42.7–76)
NITRITE UR QL STRIP: NEGATIVE
NRBC BLD AUTO-RTO: 0 /100 WBC (ref 0–0.2)
PH UR STRIP.AUTO: <=5 [PH] (ref 5–8)
PLATELET # BLD AUTO: 205 10*3/MM3 (ref 140–450)
PMV BLD AUTO: 9.9 FL (ref 6–12)
PROT UR QL STRIP: ABNORMAL
RBC # BLD AUTO: 5.31 10*6/MM3 (ref 4.14–5.8)
RBC # UR STRIP: NORMAL /HPF
REF LAB TEST METHOD: NORMAL
SP GR UR STRIP: 1.03 (ref 1–1.03)
SQUAMOUS #/AREA URNS HPF: NORMAL /HPF
UROBILINOGEN UR QL STRIP: ABNORMAL
WBC # UR STRIP: NORMAL /HPF
WBC NRBC COR # BLD: 8.5 10*3/MM3 (ref 3.4–10.8)
WHOLE BLOOD HOLD COAG: NORMAL
WHOLE BLOOD HOLD SPECIMEN: NORMAL

## 2023-02-09 PROCEDURE — 36415 COLL VENOUS BLD VENIPUNCTURE: CPT

## 2023-02-09 PROCEDURE — 99284 EMERGENCY DEPT VISIT MOD MDM: CPT

## 2023-02-09 PROCEDURE — 83690 ASSAY OF LIPASE: CPT

## 2023-02-09 PROCEDURE — 81001 URINALYSIS AUTO W/SCOPE: CPT

## 2023-02-09 PROCEDURE — 85025 COMPLETE CBC W/AUTO DIFF WBC: CPT

## 2023-02-09 PROCEDURE — 25010000002 IOPAMIDOL 61 % SOLUTION: Performed by: EMERGENCY MEDICINE

## 2023-02-09 PROCEDURE — 74177 CT ABD & PELVIS W/CONTRAST: CPT

## 2023-02-09 PROCEDURE — 80053 COMPREHEN METABOLIC PANEL: CPT

## 2023-02-09 PROCEDURE — 82565 ASSAY OF CREATININE: CPT

## 2023-02-09 PROCEDURE — 93005 ELECTROCARDIOGRAM TRACING: CPT

## 2023-02-09 PROCEDURE — 84484 ASSAY OF TROPONIN QUANT: CPT

## 2023-02-09 RX ORDER — LIDOCAINE HYDROCHLORIDE 20 MG/ML
15 SOLUTION OROPHARYNGEAL ONCE
Status: COMPLETED | OUTPATIENT
Start: 2023-02-09 | End: 2023-02-09

## 2023-02-09 RX ORDER — ALUMINA, MAGNESIA, AND SIMETHICONE 2400; 2400; 240 MG/30ML; MG/30ML; MG/30ML
15 SUSPENSION ORAL ONCE
Status: COMPLETED | OUTPATIENT
Start: 2023-02-09 | End: 2023-02-09

## 2023-02-09 RX ORDER — SODIUM CHLORIDE 0.9 % (FLUSH) 0.9 %
10 SYRINGE (ML) INJECTION AS NEEDED
Status: DISCONTINUED | OUTPATIENT
Start: 2023-02-09 | End: 2023-02-10 | Stop reason: HOSPADM

## 2023-02-09 RX ADMIN — ALUMINUM HYDROXIDE, MAGNESIUM HYDROXIDE, AND DIMETHICONE 15 ML: 400; 400; 40 SUSPENSION ORAL at 23:38

## 2023-02-09 RX ADMIN — SODIUM CHLORIDE, POTASSIUM CHLORIDE, SODIUM LACTATE AND CALCIUM CHLORIDE 500 ML: 600; 310; 30; 20 INJECTION, SOLUTION INTRAVENOUS at 23:38

## 2023-02-09 RX ADMIN — IOPAMIDOL 100 ML: 612 INJECTION, SOLUTION INTRAVENOUS at 23:03

## 2023-02-09 RX ADMIN — LIDOCAINE HYDROCHLORIDE 15 ML: 20 SOLUTION ORAL; TOPICAL at 23:38

## 2023-02-10 VITALS
HEART RATE: 63 BPM | WEIGHT: 179 LBS | OXYGEN SATURATION: 97 % | DIASTOLIC BLOOD PRESSURE: 102 MMHG | RESPIRATION RATE: 18 BRPM | BODY MASS INDEX: 25.62 KG/M2 | TEMPERATURE: 99.1 F | SYSTOLIC BLOOD PRESSURE: 132 MMHG | HEIGHT: 70 IN

## 2023-02-10 LAB
ALBUMIN SERPL-MCNC: 4.4 G/DL (ref 3.5–5.2)
ALBUMIN/GLOB SERPL: 1.5 G/DL
ALP SERPL-CCNC: 64 U/L (ref 39–117)
ALT SERPL W P-5'-P-CCNC: 40 U/L (ref 1–41)
ANION GAP SERPL CALCULATED.3IONS-SCNC: 12 MMOL/L (ref 5–15)
AST SERPL-CCNC: 29 U/L (ref 1–40)
BILIRUB SERPL-MCNC: 0.5 MG/DL (ref 0–1.2)
BUN SERPL-MCNC: 19 MG/DL (ref 8–23)
BUN/CREAT SERPL: 16.7 (ref 7–25)
CALCIUM SPEC-SCNC: 10.2 MG/DL (ref 8.6–10.5)
CHLORIDE SERPL-SCNC: 99 MMOL/L (ref 98–107)
CO2 SERPL-SCNC: 27 MMOL/L (ref 22–29)
CREAT SERPL-MCNC: 1.14 MG/DL (ref 0.76–1.27)
EGFRCR SERPLBLD CKD-EPI 2021: 68.8 ML/MIN/1.73
GLOBULIN UR ELPH-MCNC: 3 GM/DL
GLUCOSE SERPL-MCNC: 129 MG/DL (ref 65–99)
HOLD SPECIMEN: NORMAL
HOLD SPECIMEN: NORMAL
LIPASE SERPL-CCNC: 23 U/L (ref 13–60)
POTASSIUM SERPL-SCNC: 3.7 MMOL/L (ref 3.5–5.2)
PROT SERPL-MCNC: 7.4 G/DL (ref 6–8.5)
SODIUM SERPL-SCNC: 138 MMOL/L (ref 136–145)
TROPONIN T SERPL HS-MCNC: 8 NG/L

## 2023-02-10 RX ORDER — ONDANSETRON 4 MG/1
4 TABLET, FILM COATED ORAL EVERY 8 HOURS PRN
Qty: 15 TABLET | Refills: 0 | Status: SHIPPED | OUTPATIENT
Start: 2023-02-10

## 2023-02-10 NOTE — ED PROVIDER NOTES
"Subjective   History of Present Illness  72-year-old male presents for evaluation of upper abdominal pain and nausea/vomiting.  He states that he has a history of a prior hiatal hernia requiring surgical repair by Dr. Mcconnell.  He notes that for the past 2 days he has been experiencing pain to his upper abdomen accompanied by several episodes of nausea and vomiting.  He denies any accompanying issues with bowel movements.  He describes the pain as a \"burning sensation.\"  He notes that his symptoms are worse when he lies flat.  He endorses compliance with his PPI.  He denies any certain foods that seem to trigger his symptoms.  He has seen Dr. Sanchez of gastroenterology regarding his symptoms before in the past and was established with a gastroenterologist at the [facility] that unfortunately left for [place].  He is not currently established with a gastroenterologist.  He is not anticoagulated.  He denies any prior history of varices.  He is not a heavy alcohol drinker.  He denies any known sick contacts or any known exposures to anyone with COVID-19.        Review of Systems   Gastrointestinal: Positive for abdominal pain, nausea and vomiting.   All other systems reviewed and are negative.      Past Medical History:   Diagnosis Date   • Cancer (HCC)     prostate, slow growing, monitored by urology, Naveed Spencer MD   • Exocrine pancreatic insufficiency     Monitored by GI Dr. Daniel Sanchez   • Hypertension    • Injury of left rotator cuff     partial thickness rotator cuff tear       Allergies   Allergen Reactions   • Sulfa Antibiotics Rash       Past Surgical History:   Procedure Laterality Date   • CYST REMOVAL      corner of right eye   • EYE SURGERY Bilateral    • HERNIA REPAIR  05/2013    hiatal   • INGUINAL HERNIA REPAIR  05/2021    bilateral   • KNEE CARTILAGE SURGERY  2008    left knee meniscus repair   • PARTIAL GASTRECTOMY  05/2013    severe hiatal hernia, emergency surgery    • UMBILICAL " HERNIA REPAIR  12/2013   • VASECTOMY  1990       Family History   Problem Relation Age of Onset   • Cancer Mother         uterine cancer   • Hypertension Mother    • Cancer Father         prostate and bone   • Diabetes Father    • Other Sister         knee replacement   • Cancer Paternal Grandfather         prostate       Social History     Socioeconomic History   • Marital status:    Tobacco Use   • Smoking status: Never   • Smokeless tobacco: Never   Vaping Use   • Vaping Use: Never used   Substance and Sexual Activity   • Alcohol use: No   • Drug use: No   • Sexual activity: Yes           Objective   Physical Exam  Vitals and nursing note reviewed.   Constitutional:       General: He is not in acute distress.     Appearance: He is well-developed. He is not diaphoretic.      Comments: Nontoxic-appearing male   HENT:      Head: Normocephalic and atraumatic.   Neck:      Vascular: No JVD.   Cardiovascular:      Rate and Rhythm: Normal rate and regular rhythm.      Heart sounds: Normal heart sounds. No murmur heard.    No friction rub. No gallop.   Pulmonary:      Effort: Pulmonary effort is normal. No respiratory distress.      Breath sounds: Normal breath sounds. No wheezing or rales.   Abdominal:      General: Bowel sounds are normal. There is no distension.      Palpations: Abdomen is soft. There is no mass.      Tenderness: There is abdominal tenderness. There is no guarding.      Comments: Mild tenderness noted epigastrium, no pain out of proportion to exam, negative Alegria's sign, no peritoneal signs   Genitourinary:     Comments: No CVA tenderness present  Musculoskeletal:         General: Normal range of motion.      Cervical back: Normal range of motion.   Skin:     General: Skin is warm and dry.      Coloration: Skin is not pale.      Findings: No erythema or rash.      Comments: No dermatomal rash present   Neurological:      Mental Status: He is alert and oriented to person, place, and time.    Psychiatric:         Thought Content: Thought content normal.         Judgment: Judgment normal.         Procedures           ED Course  ED Course as of 02/10/23 0012   Thu Feb 09, 2023 2157 72-year-old male with a history of prior hiatal hernia presents for evaluation of upper abdominal pain and nausea/vomiting for the past 2 days.  On arrival to the ED, the patient is nontoxic-appearing.  Exam remarkable for mild upper abdominal tenderness without peritoneal signs or pain out of proportion to exam.  Negative Alegria's sign.  No CVA tenderness noted.  No dermatomal rash present.  Broad differential diagnosis.  We will obtain labs and imaging, and we will reassess following initial interventions. [DD]   2246 Labs are bland. [DD]   Fri Feb 10, 2023   0000 CT of abdomen/pelvis is negative for emergent/surgical intra-abdominal process. [DD]   0001 Upon reevaluation following medications, the patient looks and feels much improved.  He is tolerating p.o.  Reassured.  The patient was referred with Dr. Peterson of gastroenterology.  He will follow-up within the next week to set up outpatient EGD [DD]   0001  on a prompt outpatient basis.  Counseled regarding dietary modifications.  Agreeable with plan and given appropriate strict return precautions. [DD]      ED Course User Index  [DD] Kane Bustamante MD                                       Recent Results (from the past 24 hour(s))   Urinalysis With Microscopic If Indicated (No Culture) - Urine, Clean Catch    Collection Time: 02/09/23  9:51 PM    Specimen: Urine, Clean Catch   Result Value Ref Range    Color, UA Yellow Yellow, Straw    Appearance, UA Clear Clear    pH, UA <=5.0 5.0 - 8.0    Specific Gravity, UA 1.029 1.001 - 1.030    Glucose, UA Negative Negative    Ketones, UA Trace (A) Negative    Bilirubin, UA Negative Negative    Blood, UA Negative Negative    Protein, UA 30 mg/dL (1+) (A) Negative    Leuk Esterase, UA Negative Negative    Nitrite, UA  Negative Negative    Urobilinogen, UA 0.2 E.U./dL 0.2 - 1.0 E.U./dL   Urinalysis, Microscopic Only - Urine, Clean Catch    Collection Time: 02/09/23  9:51 PM    Specimen: Urine, Clean Catch   Result Value Ref Range    RBC, UA 0-2 None Seen, 0-2 /HPF    WBC, UA 0-2 None Seen, 0-2 /HPF    Bacteria, UA None Seen None Seen, Trace /HPF    Squamous Epithelial Cells, UA 0-2 None Seen, 0-2 /HPF    Hyaline Casts, UA 0-6 0 - 6 /LPF    Methodology Automated Microscopy    Lavender Top    Collection Time: 02/09/23  9:57 PM   Result Value Ref Range    Extra Tube hold for add-on    Gold Top - SST    Collection Time: 02/09/23  9:57 PM   Result Value Ref Range    Extra Tube Hold for add-ons.    Light Blue Top    Collection Time: 02/09/23  9:57 PM   Result Value Ref Range    Extra Tube Hold for add-ons.    CBC Auto Differential    Collection Time: 02/09/23  9:57 PM    Specimen: Blood   Result Value Ref Range    WBC 8.50 3.40 - 10.80 10*3/mm3    RBC 5.31 4.14 - 5.80 10*6/mm3    Hemoglobin 17.5 13.0 - 17.7 g/dL    Hematocrit 50.2 37.5 - 51.0 %    MCV 94.5 79.0 - 97.0 fL    MCH 33.0 26.6 - 33.0 pg    MCHC 34.9 31.5 - 35.7 g/dL    RDW 12.5 12.3 - 15.4 %    RDW-SD 43.0 37.0 - 54.0 fl    MPV 9.9 6.0 - 12.0 fL    Platelets 205 140 - 450 10*3/mm3    Neutrophil % 67.4 42.7 - 76.0 %    Lymphocyte % 18.1 (L) 19.6 - 45.3 %    Monocyte % 10.5 5.0 - 12.0 %    Eosinophil % 2.9 0.3 - 6.2 %    Basophil % 0.5 0.0 - 1.5 %    Immature Grans % 0.6 (H) 0.0 - 0.5 %    Neutrophils, Absolute 5.73 1.70 - 7.00 10*3/mm3    Lymphocytes, Absolute 1.54 0.70 - 3.10 10*3/mm3    Monocytes, Absolute 0.89 0.10 - 0.90 10*3/mm3    Eosinophils, Absolute 0.25 0.00 - 0.40 10*3/mm3    Basophils, Absolute 0.04 0.00 - 0.20 10*3/mm3    Immature Grans, Absolute 0.05 0.00 - 0.05 10*3/mm3    nRBC 0.0 0.0 - 0.2 /100 WBC   ECG 12 Lead ED Triage Standing Order; Abdominal Pain (Upper)    Collection Time: 02/09/23  9:59 PM   Result Value Ref Range    QT Interval 358 ms    QTC Interval  426 ms   POC Creatinine    Collection Time: 02/09/23 10:04 PM    Specimen: Blood   Result Value Ref Range    Creatinine 1.30 mg/dL   POC Creatinine    Collection Time: 02/09/23 10:04 PM    Specimen: Blood   Result Value Ref Range    Creatinine 1.30 0.60 - 1.30 mg/dL   Comprehensive Metabolic Panel    Collection Time: 02/09/23 11:44 PM    Specimen: Blood   Result Value Ref Range    Glucose 129 (H) 65 - 99 mg/dL    BUN 19 8 - 23 mg/dL    Creatinine 1.14 0.76 - 1.27 mg/dL    Sodium 138 136 - 145 mmol/L    Potassium 3.7 3.5 - 5.2 mmol/L    Chloride 99 98 - 107 mmol/L    CO2 27.0 22.0 - 29.0 mmol/L    Calcium 10.2 8.6 - 10.5 mg/dL    Total Protein 7.4 6.0 - 8.5 g/dL    Albumin 4.4 3.5 - 5.2 g/dL    ALT (SGPT) 40 1 - 41 U/L    AST (SGOT) 29 1 - 40 U/L    Alkaline Phosphatase 64 39 - 117 U/L    Total Bilirubin 0.5 0.0 - 1.2 mg/dL    Globulin 3.0 gm/dL    A/G Ratio 1.5 g/dL    BUN/Creatinine Ratio 16.7 7.0 - 25.0    Anion Gap 12.0 5.0 - 15.0 mmol/L    eGFR 68.8 >60.0 mL/min/1.73   Lipase    Collection Time: 02/09/23 11:44 PM    Specimen: Blood   Result Value Ref Range    Lipase 23 13 - 60 U/L   High Sensitivity Troponin T    Collection Time: 02/09/23 11:44 PM    Specimen: Blood   Result Value Ref Range    HS Troponin T 8 <15 ng/L     Note: In addition to lab results from this visit, the labs listed above may include labs taken at another facility or during a different encounter within the last 24 hours. Please correlate lab times with ED admission and discharge times for further clarification of the services performed during this visit.    CT Abdomen Pelvis With Contrast   Final Result   1. A small hiatal hernia is present. The lower esophagus and hernia are distended with fluid. This is nonspecific, potentially evidence of gastroesophageal reflux. Correlation with upper endoscopy is suggested if not recently performed. There is    otherwise no evidence of acute disease in the abdomen or pelvis.   2. Sigmoid and descending  "colonic diverticulosis.   3. Enlarged prostate.   4. Additional chronic findings as above.      Electronically signed by:  Devendra Terry M.D.     2/9/2023 9:57 PM Mountain Time        Vitals:    02/09/23 2141   BP: (!) 155/115   BP Location: Right arm   Patient Position: Sitting   Pulse: 90   Resp: 18   Temp: 99.1 °F (37.3 °C)   TempSrc: Oral   SpO2: 98%   Weight: 81.2 kg (179 lb)   Height: 177.8 cm (70\")     Medications   sodium chloride 0.9 % flush 10 mL (has no administration in time range)   aluminum-magnesium hydroxide-simethicone (MAALOX MAX) 400-400-40 MG/5ML suspension 15 mL (15 mL Oral Given 2/9/23 2338)   Lidocaine Viscous HCl (XYLOCAINE) 2 % solution 15 mL (15 mL Mouth/Throat Given 2/9/23 2338)   lactated ringers bolus 500 mL (500 mL Intravenous New Bag 2/9/23 2338)   iopamidol (ISOVUE-300) 61 % injection 100 mL (100 mL Intravenous Given 2/9/23 2303)     ECG/EMG Results (last 24 hours)     Procedure Component Value Units Date/Time    ECG 12 Lead ED Triage Standing Order; Abdominal Pain (Upper) [[id number]] Collected: 02/09/23 2159     Updated: 02/09/23 2201     QT Interval 358 ms      QTC Interval 426 ms     Narrative:      Test Reason : ED Triage Standing Order~  Blood Pressure :   */*   mmHG  Vent. Rate :  85 BPM     Atrial Rate :  85 BPM     P-R Int : 142 ms          QRS Dur :  82 ms      QT Int : 358 ms       P-R-T Axes :   6 -19  30 degrees     QTc Int : 426 ms    Normal sinus rhythm  Possible Anterior infarct , age undetermined  Abnormal ECG  When compared with ECG of 27-DEC-2013 09:28,  Questionable change in QRS axis    Referred By:            Confirmed By:         ECG 12 Lead ED Triage Standing Order; Abdominal Pain (Upper)   Preliminary Result   Test Reason : ED Triage Standing Order~   Blood Pressure :   */*   mmHG   Vent. Rate :  85 BPM     Atrial Rate :  85 BPM      P-R Int : 142 ms          QRS Dur :  82 ms       QT Int : 358 ms       P-R-T Axes :   6 -19  30 degrees      QTc Int : 426 ms    "   Normal sinus rhythm   Possible Anterior infarct , age undetermined   Abnormal ECG   When compared with ECG of 27-DEC-2013 09:28,   Questionable change in QRS axis      Referred By:            Confirmed By:                  RODRIGUEZ    Final diagnoses:   Gastroesophageal reflux disease, unspecified whether esophagitis present   Hiatal hernia       ED Disposition  ED Disposition     ED Disposition   Discharge    Condition   Stable    Comment   --             Jacob Peterson MD  [address] [address]  [phone]    In 1 week           Medication List      New Prescriptions    ondansetron 4 MG tablet  Commonly known as: ZOFRAN  Take 1 tablet by mouth Every 8 (Eight) Hours As Needed for Nausea.           Where to Get Your Medications      These medications were sent to [facility] - [place], [place] - [address] [place] [address] - [phone] [place] [phone] [address] [address]    Phone: [phone]   · ondansetron 4 MG tablet          Kane Bustamante MD  02/10/23 0023

## 2023-02-14 LAB
QT INTERVAL: 358 MS
QTC INTERVAL: 426 MS

## 2023-02-27 ENCOUNTER — OFFICE VISIT (OUTPATIENT)
Dept: FAMILY MEDICINE CLINIC | Facility: CLINIC | Age: 73
End: 2023-02-27
Payer: MEDICARE

## 2023-02-27 VITALS
OXYGEN SATURATION: 97 % | HEIGHT: 70 IN | SYSTOLIC BLOOD PRESSURE: 122 MMHG | DIASTOLIC BLOOD PRESSURE: 72 MMHG | HEART RATE: 82 BPM | RESPIRATION RATE: 18 BRPM | WEIGHT: 183 LBS | BODY MASS INDEX: 26.2 KG/M2 | TEMPERATURE: 98 F

## 2023-02-27 DIAGNOSIS — K44.9 HIATAL HERNIA: ICD-10-CM

## 2023-02-27 DIAGNOSIS — M54.32 SCIATICA OF LEFT SIDE: ICD-10-CM

## 2023-02-27 DIAGNOSIS — K21.9 GASTROESOPHAGEAL REFLUX DISEASE WITHOUT ESOPHAGITIS: Primary | ICD-10-CM

## 2023-02-27 DIAGNOSIS — G47.09 OTHER INSOMNIA: ICD-10-CM

## 2023-02-27 PROBLEM — F98.1 PSYCHOGENIC FECAL INCONTINENCE: Status: ACTIVE | Noted: 2023-02-27

## 2023-02-27 PROCEDURE — 99214 OFFICE O/P EST MOD 30 MIN: CPT | Performed by: STUDENT IN AN ORGANIZED HEALTH CARE EDUCATION/TRAINING PROGRAM

## 2023-02-27 RX ORDER — TRAZODONE HYDROCHLORIDE 50 MG/1
TABLET ORAL
Qty: 180 TABLET | Refills: 3 | Status: SHIPPED | OUTPATIENT
Start: 2023-02-27

## 2023-02-27 NOTE — PROGRESS NOTES
Established Patient Office Visit        Subjective      Chief Complaint:  Heartburn (Acid reflux, one episode of brown vomit, needing refill of trazadone)      History of Present Illness: Ahmet Mariee is a 72 y.o. male who presents for follow-up of epigastric pain.    Patient had cough spell with some brown vomiting. He has coughing he feels related to reflux about twice weekly. He had some worsening cough and epigastric pain February 9th and CT was done with below results.     Since this episode reflux symptoms have gone back to baseline he still has an occasional cough and reflux symptoms.  Of note we decrease his Nexium from twice daily down to once daily and he feels like other than this 1 event his GERD symptoms have been unchanged.    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)   • Polycythemia   • Irritable bowel syndrome with diarrhea   • Liver nodule   • Sciatica of left side   • Psychogenic fecal incontinence         Current Outpatient Medications:   •  esomeprazole (nexIUM) 40 MG capsule, Take 1 capsule by mouth Every Morning Before Breakfast., Disp: 90 capsule, Rfl: 3  •  hydroCHLOROthiazide (HYDRODIURIL) 12.5 MG tablet, Take 1 tablet by mouth Daily., Disp: 90 tablet, Rfl: 3  •  hyoscyamine (ANASPAZ,LEVSIN) 0.125 MG tablet, Take 1 tablet by mouth Every 6 (Six) Hours As Needed for Cramping., Disp: 360 tablet, Rfl: 3  •  lisinopril (PRINIVIL,ZESTRIL) 30 MG tablet, TAKE 1 TABLET DAILY, Disp: 90 tablet, Rfl: 3  •  Omega-3 Fatty Acids (fish oil) 1000 MG capsule capsule, Fish Oil, Disp: , Rfl:   •  ondansetron (ZOFRAN) 4 MG tablet, Take 1 tablet  "by mouth Every 8 (Eight) Hours As Needed for Nausea., Disp: 15 tablet, Rfl: 0  •  Saw Palmetto 1000 MG capsule, Take  by mouth., Disp: , Rfl:   •  tadalafil (CIALIS) 20 MG tablet, Take 20 mg by mouth Daily., Disp: , Rfl:   •  tadalafil (CIALIS) 5 MG tablet, Take 1 tablet by mouth Daily., Disp: , Rfl:   •  traZODone (DESYREL) 50 MG tablet, TAKE 1 TO 2 TABLETS NIGHTLY as needed for sleep, Disp: 180 tablet, Rfl: 3  •  uribel (URO-MP) 118 MG capsule capsule, Take 1 capsule by mouth 4 (Four) Times a Day., Disp: 30 capsule, Rfl: 2  •  Mirabegron ER (Myrbetriq) 50 MG tablet sustained-release 24 hour 24 hr tablet, Daily., Disp: , Rfl:        Objective     Physical Exam:   Vital Signs:   /72 (BP Location: Left arm, Patient Position: Sitting, Cuff Size: Adult)   Pulse 82   Temp 98 °F (36.7 °C) (Temporal)   Resp 18   Ht 177.8 cm (70\")   Wt 83 kg (183 lb)   SpO2 97%   BMI 26.26 kg/m²      Physical Exam  Constitutional:       General: He is not in acute distress.     Appearance: He is not ill-appearing.   Cardiovascular:      Rate and Rhythm: Normal rate and regular rhythm.   Pulmonary:      Effort: Pulmonary effort is normal.      Breath sounds: Normal breath sounds.   Neurological:      Mental Status: He is alert.   Psychiatric:         Thought Content: Thought content normal.   Abdomen soft nontender no mass    CT abd pelvis   IMPRESSION:  1. A small hiatal hernia is present. The lower esophagus and hernia are distended with fluid. This is nonspecific, potentially evidence of gastroesophageal reflux. Correlation with upper endoscopy is suggested if not recently performed. There is   otherwise no evidence of acute disease in the abdomen or pelvis.  2. Sigmoid and descending colonic diverticulosis.  3. Enlarged prostate.  4. Additional chronic findings as above.         Assessment / Plan      Assessment/Plan:   Diagnoses and all orders for this visit:    1. Gastroesophageal reflux disease without esophagitis " (Primary)  Assessment & Plan:  - Given the episode of epigastric pain and vomiting and some lower esophageal distention on CT I think is reasonable to repeat a EGD at this point and he has been instructed to call GI  -Continue Nexium once daily (previously on twice daily for years but he truly feels there is no significant difference between once and twice daily).  I let the patient know that pending scope he may need to go back up to Nexium twice daily      2. Hiatal hernia  Overview:  S/p emmergent repair of entrapped hiatal hernia and partial gastrectomy. 2013    Assessment & Plan:  Follow-up with GI for EGD.      3. Sciatica of left side  Assessment & Plan:  This is improving with PT and chiropractics       4. Other insomnia  -     traZODone (DESYREL) 50 MG tablet; TAKE 1 TO 2 TABLETS NIGHTLY as needed for sleep  Dispense: 180 tablet; Refill: 3  Refill trazodone discussed side effects including GI and urologic side effects      Follow Up:   Return for keep june appt .      MDM:     Evaristo Gaffney MD  Family Medicine - [facility] [place]

## 2023-02-27 NOTE — ASSESSMENT & PLAN NOTE
- Given the episode of epigastric pain and vomiting and some lower esophageal distention on CT I think is reasonable to repeat a EGD at this point and he has been instructed to call GI  -Continue Nexium once daily (previously on twice daily for years but he truly feels there is no significant difference between once and twice daily).  I let the patient know that pending scope he may need to go back up to Nexium twice daily

## 2023-04-07 RX ORDER — LISINOPRIL 30 MG/1
TABLET ORAL
Qty: 90 TABLET | Refills: 3 | Status: SHIPPED | OUTPATIENT
Start: 2023-04-07

## 2023-06-07 DIAGNOSIS — N41.0 ACUTE PROSTATITIS: ICD-10-CM

## 2023-06-07 NOTE — TELEPHONE ENCOUNTER
Rx Refill Note  Requested Prescriptions     Pending Prescriptions Disp Refills    uribel (URO-MP) 118 MG capsule capsule 30 capsule 2     Sig: Take 1 capsule by mouth 4 (Four) Times a Day.      Last office visit with prescribing clinician: 2/27/2023   Last telemedicine visit with prescribing clinician: Visit date not found   Next office visit with prescribing clinician: 6/9/2023                         Would you like a call back once the refill request has been completed: [] Yes [] No    If the office needs to give you a call back, can they leave a voicemail: [] Yes [] No    Jakob Barnett MA  06/07/23, 14:31 EDT

## 2023-06-07 NOTE — TELEPHONE ENCOUNTER
Caller: Ahmet Mariee    Relationship: Self    Best call back number: [phone]     Requested Prescriptions:   Requested Prescriptions     Pending Prescriptions Disp Refills    uribel (URO-MP) 118 MG capsule capsule 30 capsule 2     Sig: Take 1 capsule by mouth 4 (Four) Times a Day.        Pharmacy where request should be sent: [id number] - [place], [place] - [address] [address] - [phone]  - [phone]      Last office visit with prescribing clinician: 2/27/2023   Last telemedicine visit with prescribing clinician: Visit date not found   Next office visit with prescribing clinician: 6/9/2023     Additional details provided by patient: HE CAN'T REACH HIS UROLOGIST TO GET THIS FILLED. HE TAKES THIS AS NEEDED. ESPECIALLY AT NIGHT TO HELP WITH SUPER PUBIC PAIN AND FREQUENCY.    Does the patient have less than a 3 day supply:  [x] Yes  [] No    Would you like a call back once the refill request has been completed: [] Yes [x] No    If the office needs to give you a call back, can they leave a voicemail: [] Yes [x] No    Shefali Gaytan, PCT   06/07/23 14:14 EDT

## 2023-06-08 RX ORDER — METHENAMINE, SODIUM PHOSPHATE, MONOBASIC, MONOHYDRATE, PHENYL SALICYLATE, METHYLENE BLUE, AND HYOSCYAMINE SULFATE 120; 40.8; 36; 10; .12 MG/1; MG/1; MG/1; MG/1; MG/1
1 CAPSULE ORAL 4 TIMES DAILY
Qty: 30 CAPSULE | Refills: 2 | Status: SHIPPED | OUTPATIENT
Start: 2023-06-08

## 2023-06-09 ENCOUNTER — LAB (OUTPATIENT)
Dept: LAB | Facility: HOSPITAL | Age: 73
End: 2023-06-09
Payer: MEDICARE

## 2023-06-09 ENCOUNTER — OFFICE VISIT (OUTPATIENT)
Dept: FAMILY MEDICINE CLINIC | Facility: CLINIC | Age: 73
End: 2023-06-09
Payer: MEDICARE

## 2023-06-09 ENCOUNTER — TELEPHONE (OUTPATIENT)
Dept: FAMILY MEDICINE CLINIC | Facility: CLINIC | Age: 73
End: 2023-06-09

## 2023-06-09 VITALS
SYSTOLIC BLOOD PRESSURE: 124 MMHG | HEART RATE: 70 BPM | OXYGEN SATURATION: 99 % | WEIGHT: 182.4 LBS | HEIGHT: 70 IN | BODY MASS INDEX: 26.11 KG/M2 | DIASTOLIC BLOOD PRESSURE: 78 MMHG | RESPIRATION RATE: 18 BRPM | TEMPERATURE: 98 F

## 2023-06-09 DIAGNOSIS — N41.1 CHRONIC PROSTATITIS: ICD-10-CM

## 2023-06-09 DIAGNOSIS — C61 PROSTATE CANCER: ICD-10-CM

## 2023-06-09 DIAGNOSIS — I13.10 HYPERTENSIVE HEART AND KIDNEY DISEASE WITHOUT HEART FAILURE AND WITH STAGE 3A CHRONIC KIDNEY DISEASE: ICD-10-CM

## 2023-06-09 DIAGNOSIS — N18.31 HYPERTENSIVE HEART AND KIDNEY DISEASE WITHOUT HEART FAILURE AND WITH STAGE 3A CHRONIC KIDNEY DISEASE: ICD-10-CM

## 2023-06-09 LAB
BILIRUB UR QL STRIP: NEGATIVE
CLARITY UR: CLEAR
COLOR UR: ABNORMAL
GLUCOSE UR STRIP-MCNC: NEGATIVE MG/DL
HGB UR QL STRIP.AUTO: ABNORMAL
KETONES UR QL STRIP: NEGATIVE
LEUKOCYTE ESTERASE UR QL STRIP.AUTO: NEGATIVE
NITRITE UR QL STRIP: NEGATIVE
PH UR STRIP.AUTO: 7 [PH] (ref 5–8)
PROT UR QL STRIP: ABNORMAL
SP GR UR STRIP: 1.02 (ref 1–1.03)
UROBILINOGEN UR QL STRIP: ABNORMAL

## 2023-06-09 PROCEDURE — 81003 URINALYSIS AUTO W/O SCOPE: CPT

## 2023-06-09 PROCEDURE — 87086 URINE CULTURE/COLONY COUNT: CPT

## 2023-06-09 NOTE — TELEPHONE ENCOUNTER
Caller: Ahmet Mariee    Relationship to patient: Self    Best call back number: [phone]     Chief complaint: FOLLOW UP FOR PROSTATITIS     Type of visit: OFFICE VISIT     Requested date: WEEK OF 6/18/23    If rescheduling, when is the original appointment:     Additional notes: PATIENT WOULD LIKE TO ASK IF HE CAN POSSIBLY HE FIT INTO PCP'S SCHEDULE THE WEEK OF 6/18/23. PATIENT STATES PCP ASKED FOR HIM TO FOLLOW UP WITH HIM REGARDING HIS DIAGNOSIS OF PROSTATITIS. PATIENT STATES HE WOULD ALSO LIKE PCP TO KNOW HE IS UNABLE TO GET IN WITH HIS UROLOGIST UNTIL 7/13/23.

## 2023-06-09 NOTE — ASSESSMENT & PLAN NOTE
He has restart doxycycline 2 days ago prescribed previously by urologist. Will treat x 21 days and uribel   Follow-up with urology if not improving.

## 2023-06-09 NOTE — PROGRESS NOTES
Established Patient Office Visit        Subjective      Chief Complaint:  Hypertension (6 mo fu)      History of Present Illness: Ahmet Mariee is a 72 y.o. male who presents for increased frequency. Bowel pressure. Suprapubic pain and penile pain. No blood in urine.     No side effects from medication.      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    Polycythemia    Irritable bowel syndrome with diarrhea    Liver nodule    Sciatica of left side    Psychogenic fecal incontinence    Chronic prostatitis         Current Outpatient Medications:     esomeprazole (nexIUM) 40 MG capsule, Take 1 capsule by mouth Every Morning Before Breakfast., Disp: 90 capsule, Rfl: 3    hydroCHLOROthiazide (HYDRODIURIL) 12.5 MG tablet, Take 1 tablet by mouth Daily., Disp: 90 tablet, Rfl: 3    hyoscyamine (ANASPAZ,LEVSIN) 0.125 MG tablet, Take 1 tablet by mouth Every 6 (Six) Hours As Needed for Cramping., Disp: 360 tablet, Rfl: 3    lisinopril (PRINIVIL,ZESTRIL) 30 MG tablet, TAKE 1 TABLET DAILY, Disp: 90 tablet, Rfl: 3    Omega-3 Fatty Acids (fish oil) 1000 MG capsule capsule, Fish Oil, Disp: , Rfl:     ondansetron (ZOFRAN) 4 MG tablet, Take 1 tablet by mouth Every 8 (Eight) Hours As Needed for Nausea., Disp: 15 tablet, Rfl: 0    Saw Palmetto 1000 MG capsule, Take  by mouth., Disp: , Rfl:     tadalafil (CIALIS) 20 MG tablet, Take 1 tablet by mouth Daily., Disp: , Rfl:     tadalafil (CIALIS) 5 MG tablet, Take 1 tablet by mouth Daily., Disp: , Rfl:     traZODone (DESYREL) 50 MG tablet, TAKE 1 TO 2 TABLETS NIGHTLY as needed for sleep, Disp: 180 tablet, Rfl: 3     "uribel (URO-MP) 118 MG capsule capsule, Take 1 capsule by mouth 4 (Four) Times a Day., Disp: 30 capsule, Rfl: 2       Objective     Physical Exam:   Vital Signs:   /78   Pulse 70   Temp 98 °F (36.7 °C) (Temporal)   Resp 18   Ht 177.8 cm (70\")   Wt 82.7 kg (182 lb 6.4 oz)   SpO2 99%   BMI 26.17 kg/m²      Physical Exam  Constitutional:       General: He is not in acute distress.     Appearance: He is not ill-appearing.   Cardiovascular:      Rate and Rhythm: Normal rate and regular rhythm.   Pulmonary:      Effort: Pulmonary effort is normal.      Breath sounds: Normal breath sounds.   Neurological:      Mental Status: He is alert.   Psychiatric:         Thought Content: Thought content normal.   No suprapubic tenderness            Assessment / Plan      Assessment/Plan:   Diagnoses and all orders for this visit:    1. Chronic prostatitis  Assessment & Plan:  He has restart doxycycline 2 days ago prescribed previously by urologist. Will treat x 21 days and uribel   Follow-up with urology if not improving.     Orders:  -     Urinalysis without microscopic (no culture) - Urine, Clean Catch; Future  -     Urine Culture - Urine, Urine, Clean Catch; Future    2. Prostate cancer  Overview:  S/p MRI spring of 23 monitoring PSA with Dr Spencer.       3. 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 Controlled.       Follow Up:   No follow-ups on file.      MDM:     Evaristo Gaffney MD  Family Medicine - [facility]  "

## 2023-06-10 LAB — BACTERIA SPEC AEROBE CULT: NO GROWTH

## 2023-09-19 ENCOUNTER — OFFICE VISIT (OUTPATIENT)
Dept: FAMILY MEDICINE CLINIC | Facility: CLINIC | Age: 73
End: 2023-09-19
Payer: MEDICARE

## 2023-09-19 VITALS
BODY MASS INDEX: 25.97 KG/M2 | DIASTOLIC BLOOD PRESSURE: 80 MMHG | OXYGEN SATURATION: 98 % | HEART RATE: 70 BPM | RESPIRATION RATE: 21 BRPM | SYSTOLIC BLOOD PRESSURE: 126 MMHG | TEMPERATURE: 98 F | HEIGHT: 70 IN | WEIGHT: 181.4 LBS

## 2023-09-19 DIAGNOSIS — N18.31 HYPERTENSIVE HEART AND KIDNEY DISEASE WITHOUT HEART FAILURE AND WITH STAGE 3A CHRONIC KIDNEY DISEASE: ICD-10-CM

## 2023-09-19 DIAGNOSIS — M54.32 SCIATICA OF LEFT SIDE: ICD-10-CM

## 2023-09-19 DIAGNOSIS — I13.10 HYPERTENSIVE HEART AND KIDNEY DISEASE WITHOUT HEART FAILURE AND WITH STAGE 3A CHRONIC KIDNEY DISEASE: ICD-10-CM

## 2023-09-19 DIAGNOSIS — M72.2 PLANTAR FASCIITIS OF LEFT FOOT: ICD-10-CM

## 2023-09-19 PROCEDURE — 99213 OFFICE O/P EST LOW 20 MIN: CPT | Performed by: STUDENT IN AN ORGANIZED HEALTH CARE EDUCATION/TRAINING PROGRAM

## 2023-09-19 RX ORDER — HYDROCHLOROTHIAZIDE 12.5 MG/1
TABLET ORAL
Qty: 90 TABLET | Refills: 3 | Status: SHIPPED | OUTPATIENT
Start: 2023-09-19

## 2023-09-19 RX ORDER — SACCHAROMYCES BOULARDII 250 MG
250 CAPSULE ORAL 2 TIMES DAILY
COMMUNITY

## 2023-09-19 NOTE — PROGRESS NOTES
Established Patient Office Visit        Subjective      Chief Complaint:  Foot Pain (Left foot pain, not sure if may have plantar fascitis )      History of Present Illness: Ahmet Mariee is a 72 y.o. male who presents for 1 month of left foot pain. It is to bottom front of heel. Worse with walking. Started around 1 month ago. Has had some increase activity lately but no injury. He did have weight fall on foot but this was already after pain started.     L sciatica pain improved with PT.     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    Polycythemia    Irritable bowel syndrome with diarrhea    Liver nodule    Sciatica of left side    Psychogenic fecal incontinence    Chronic prostatitis         Current Outpatient Medications:     esomeprazole (nexIUM) 40 MG capsule, Take 1 capsule by mouth Every Morning Before Breakfast., Disp: 90 capsule, Rfl: 3    hydroCHLOROthiazide (HYDRODIURIL) 12.5 MG tablet, TAKE 1 TABLET DAILY, Disp: 90 tablet, Rfl: 3    hyoscyamine (ANASPAZ,LEVSIN) 0.125 MG tablet, Take 1 tablet by mouth Every 6 (Six) Hours As Needed for Cramping., Disp: 360 tablet, Rfl: 3    lisinopril (PRINIVIL,ZESTRIL) 30 MG tablet, TAKE 1 TABLET DAILY, Disp: 90 tablet, Rfl: 3    Omega-3 Fatty Acids (fish oil) 1000 MG capsule capsule, Fish Oil, Disp: , Rfl:     saccharomyces boulardii (FLORASTOR) 250 MG capsule, Take 1 capsule by mouth 2 (Two) Times a Day. [facility] otc daily, Disp: , Rfl:     Saw Palmetto 1000 MG capsule, Take  by mouth., Disp: , Rfl:     tadalafil (CIALIS) 20 MG tablet, Take 1 tablet by mouth Daily., Disp: , Rfl:     tadalafil (CIALIS) 5 MG tablet, Take 1  "tablet by mouth Daily., Disp: , Rfl:     traZODone (DESYREL) 50 MG tablet, TAKE 1 TO 2 TABLETS NIGHTLY as needed for sleep, Disp: 180 tablet, Rfl: 3    uribel (URO-MP) 118 MG capsule capsule, Take 1 capsule by mouth 4 (Four) Times a Day., Disp: 30 capsule, Rfl: 2    ondansetron (ZOFRAN) 4 MG tablet, Take 1 tablet by mouth Every 8 (Eight) Hours As Needed for Nausea. (Patient not taking: Reported on 9/19/2023), Disp: 15 tablet, Rfl: 0       Objective     Physical Exam:   Vital Signs:   /80 (BP Location: Left arm, Patient Position: Sitting, Cuff Size: Adult)   Pulse 70   Temp 98 °F (36.7 °C) (Temporal)   Resp 21   Ht 177.8 cm (70\")   Wt 82.3 kg (181 lb 6.4 oz)   SpO2 98%   BMI 26.03 kg/m²      Physical Exam  Constitutional:       General: He is not in acute distress.     Appearance: He is not ill-appearing.   Tenderness to the left anterior heel and proximal plantar fascia  No tenderness to lateral calcaneous              Assessment / Plan      Assessment/Plan:   Diagnoses and all orders for this visit:    1. Plantar fasciitis of left foot  -     Ambulatory Referral to Physical Therapy Evaluate and treat  Plantar fasciitis exercises given  Power step insoles   Or get new shoes such ans ford or hoka or similar     2. Sciatica of left side     Sciatica is much improved cont PT       Follow Up:   No follow-ups on file.      MDM:     Evaristo Gaffney MD  Family Medicine - [facility]  " negative...

## 2023-09-19 NOTE — PATIENT INSTRUCTIONS
Plantar fasciitis   Power step insoles   Or get new shoes such ans ford or hoka or similar   Johns run and walk or fleet feet.

## 2023-10-19 ENCOUNTER — OFFICE VISIT (OUTPATIENT)
Dept: FAMILY MEDICINE CLINIC | Facility: CLINIC | Age: 73
End: 2023-10-19
Payer: MEDICARE

## 2023-10-19 VITALS
SYSTOLIC BLOOD PRESSURE: 120 MMHG | HEART RATE: 76 BPM | WEIGHT: 182.2 LBS | TEMPERATURE: 98.4 F | RESPIRATION RATE: 18 BRPM | DIASTOLIC BLOOD PRESSURE: 78 MMHG | BODY MASS INDEX: 26.14 KG/M2 | OXYGEN SATURATION: 99 %

## 2023-10-19 DIAGNOSIS — Z23 IMMUNIZATION DUE: ICD-10-CM

## 2023-10-19 DIAGNOSIS — M72.2 PLANTAR FASCIITIS OF LEFT FOOT: ICD-10-CM

## 2023-10-19 DIAGNOSIS — G47.09 OTHER INSOMNIA: ICD-10-CM

## 2023-10-19 NOTE — PROGRESS NOTES
Established Patient Office Visit        Subjective      Chief Complaint:  Foot Pain (One month follow up on foot pain) and Immunizations      History of Present Illness: Ahmet Mariee is a 73 y.o. male who presents for left foot pain it is improved. Left anterior heel pain improved.     Patient had some pain when going to a football game and legs very tired after prolonged standing. Right hip pain as well. Then resolved.   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    Polycythemia    Irritable bowel syndrome with diarrhea    Liver nodule    Sciatica of left side    Psychogenic fecal incontinence    Chronic prostatitis    Other insomnia         Current Outpatient Medications:     esomeprazole (nexIUM) 40 MG capsule, Take 1 capsule by mouth Every Morning Before Breakfast., Disp: 90 capsule, Rfl: 3    hydroCHLOROthiazide (HYDRODIURIL) 12.5 MG tablet, TAKE 1 TABLET DAILY, Disp: 90 tablet, Rfl: 3    hyoscyamine (ANASPAZ,LEVSIN) 0.125 MG tablet, Take 1 tablet by mouth Every 6 (Six) Hours As Needed for Cramping., Disp: 360 tablet, Rfl: 3    lisinopril (PRINIVIL,ZESTRIL) 30 MG tablet, TAKE 1 TABLET DAILY, Disp: 90 tablet, Rfl: 3    Omega-3 Fatty Acids (fish oil) 1000 MG capsule capsule, Fish Oil, Disp: , Rfl:     saccharomyces boulardii (FLORASTOR) 250 MG capsule, Take 1 capsule by mouth 2 (Two) Times a Day. [facility] ot daily, Disp: , Rfl:     Saw Palmetto 1000 MG capsule, Take  by mouth., Disp: , Rfl:     tadalafil (CIALIS) 20 MG tablet, Take 1 tablet by mouth Daily., Disp: , Rfl:     tadalafil (CIALIS) 5 MG tablet, Take 1 tablet by mouth Daily., Disp: , Rfl:     traZODone  (DESYREL) 50 MG tablet, TAKE 1 TO 2 TABLETS NIGHTLY as needed for sleep, Disp: 180 tablet, Rfl: 3    uribel (URO-MP) 118 MG capsule capsule, Take 1 capsule by mouth 4 (Four) Times a Day., Disp: 30 capsule, Rfl: 2       Objective     Physical Exam:   Vital Signs:   /78   Pulse 76   Temp 98.4 °F (36.9 °C)   Resp 18   Wt 82.6 kg (182 lb 3.2 oz)   SpO2 99%   BMI 26.14 kg/m²      Physical Exam  Constitutional:       General: He is not in acute distress.     Appearance: He is not ill-appearing.     Some tenderness to the left anterior plantar heel.          Assessment / Plan      Assessment/Plan:   Diagnoses and all orders for this visit:    1. Other insomnia  Assessment & Plan:  Cont trazadone.     2. Plantar fasciitis of left foot  Cont physical therapy and heel cups and stretches    3. Immunization due  -     Fluzone High-Dose 65+yrs ([id number])           Follow Up:   No follow-ups on file.      MDM:     Evaristo Gaffney MD  Family Medicine - [facility]

## 2023-12-11 ENCOUNTER — OFFICE VISIT (OUTPATIENT)
Dept: FAMILY MEDICINE CLINIC | Facility: CLINIC | Age: 73
End: 2023-12-11
Payer: MEDICARE

## 2023-12-11 ENCOUNTER — LAB (OUTPATIENT)
Dept: LAB | Facility: HOSPITAL | Age: 73
End: 2023-12-11
Payer: MEDICARE

## 2023-12-11 VITALS
WEIGHT: 184.4 LBS | BODY MASS INDEX: 26.4 KG/M2 | HEIGHT: 70 IN | RESPIRATION RATE: 18 BRPM | HEART RATE: 89 BPM | TEMPERATURE: 98.6 F | DIASTOLIC BLOOD PRESSURE: 78 MMHG | SYSTOLIC BLOOD PRESSURE: 116 MMHG | OXYGEN SATURATION: 99 %

## 2023-12-11 DIAGNOSIS — R73.03 PREDIABETES: ICD-10-CM

## 2023-12-11 DIAGNOSIS — Z00.00 ENCOUNTER FOR ROUTINE ADULT HEALTH EXAMINATION WITHOUT ABNORMAL FINDINGS: Primary | ICD-10-CM

## 2023-12-11 DIAGNOSIS — R93.2 ABNORMAL FINDINGS ON DIAGNOSTIC IMAGING OF LIVER AND BILIARY TRACT: ICD-10-CM

## 2023-12-11 DIAGNOSIS — I13.10 HYPERTENSIVE HEART AND KIDNEY DISEASE WITHOUT HEART FAILURE AND WITH STAGE 3A CHRONIC KIDNEY DISEASE: ICD-10-CM

## 2023-12-11 DIAGNOSIS — K76.89 LIVER NODULE: ICD-10-CM

## 2023-12-11 DIAGNOSIS — M54.32 SCIATICA OF LEFT SIDE: ICD-10-CM

## 2023-12-11 DIAGNOSIS — K76.0 NON-ALCOHOLIC FATTY LIVER DISEASE: ICD-10-CM

## 2023-12-11 DIAGNOSIS — C61 PROSTATE CANCER: ICD-10-CM

## 2023-12-11 DIAGNOSIS — Z00.00 ENCOUNTER FOR ROUTINE ADULT HEALTH EXAMINATION WITHOUT ABNORMAL FINDINGS: ICD-10-CM

## 2023-12-11 DIAGNOSIS — N18.31 HYPERTENSIVE HEART AND KIDNEY DISEASE WITHOUT HEART FAILURE AND WITH STAGE 3A CHRONIC KIDNEY DISEASE: ICD-10-CM

## 2023-12-11 PROCEDURE — 80053 COMPREHEN METABOLIC PANEL: CPT

## 2023-12-11 PROCEDURE — 80061 LIPID PANEL: CPT

## 2023-12-11 PROCEDURE — 83036 HEMOGLOBIN GLYCOSYLATED A1C: CPT

## 2023-12-11 PROCEDURE — 85027 COMPLETE CBC AUTOMATED: CPT

## 2023-12-11 PROCEDURE — 36415 COLL VENOUS BLD VENIPUNCTURE: CPT

## 2023-12-11 PROCEDURE — 82105 ALPHA-FETOPROTEIN SERUM: CPT

## 2023-12-11 RX ORDER — LOPERAMIDE HCL 1 MG/7.5ML
SOLUTION ORAL
COMMUNITY

## 2023-12-11 RX ORDER — TRIAMCINOLONE ACETONIDE 1 MG/G
OINTMENT TOPICAL
COMMUNITY
Start: 2023-12-01

## 2023-12-11 NOTE — PROGRESS NOTES
The ABCs of the Annual Wellness Visit  Subsequent Medicare Wellness Visit    Subjective    Ahmet Mariee is a 73 y.o. male who presents for a Subsequent Medicare Wellness Visit.    The following portions of the patient's history were reviewed and   updated as appropriate: allergies, current medications, past family history, past medical history, past social history, past surgical history, and problem list.    Compared to one year ago, the patient feels his physical   health is worse.    Compared to one year ago, the patient feels his mental   health is worse.    Recent Hospitalizations:  He was not admitted to the hospital during the last year.       Current Medical Providers:  Patient Care Team:  Evaristo Gaffney MD as PCP - General (Family Medicine)  Naveed Spencer Jr., MD as Consulting Physician (Urology)    Outpatient Medications Prior to Visit   Medication Sig Dispense Refill    esomeprazole (nexIUM) 40 MG capsule Take 1 capsule by mouth Every Morning Before Breakfast. 90 capsule 3    hydroCHLOROthiazide (HYDRODIURIL) 12.5 MG tablet TAKE 1 TABLET DAILY 90 tablet 3    hyoscyamine (ANASPAZ,LEVSIN) 0.125 MG tablet Take 1 tablet by mouth Every 6 (Six) Hours As Needed for Cramping. 360 tablet 3    lisinopril (PRINIVIL,ZESTRIL) 30 MG tablet TAKE 1 TABLET DAILY 90 tablet 3    loperamide (IMODIUM A-D) 1 MG/7.5ML oral solution Take  by mouth.      Omega-3 Fatty Acids (fish oil) 1000 MG capsule capsule Fish Oil      saccharomyces boulardii (FLORASTOR) 250 MG capsule Take 1 capsule by mouth 2 (Two) Times a Day. [employer] otc daily      Saw Palmetto 1000 MG capsule Take  by mouth.      tadalafil (CIALIS) 20 MG tablet Take 1 tablet by mouth Daily.      tadalafil (CIALIS) 5 MG tablet Take 1 tablet by mouth Daily.      traZODone (DESYREL) 50 MG tablet TAKE 1 TO 2 TABLETS NIGHTLY as needed for sleep 180 tablet 3    triamcinolone (KENALOG) 0.1 % ointment       uribel (URO-MP) 118 MG capsule capsule Take 1 capsule by  "mouth 4 (Four) Times a Day. 30 capsule 2     No facility-administered medications prior to visit.       No opioid medication identified on active medication list. I have reviewed chart for other potential  high risk medication/s and harmful drug interactions in the elderly.        Aspirin is not on active medication list.  Aspirin use is not indicated based on review of current medical condition/s. Risk of harm outweighs potential benefits.  .    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  ACP discussion was held with the patient during this visit. Patient does not have an advance directive, information provided.     Objective    Vitals:    12/11/23 1326 12/11/23 1356   BP: 124/92 116/78   BP Location: Left arm    Patient Position: Sitting    Cuff Size: Adult    Pulse: 89    Resp: 18    Temp: 98.6 °F (37 °C)    TempSrc: Temporal    SpO2: 99%    Weight: 83.6 kg (184 lb 6.4 oz)    Height: 177.8 cm (70\")    PainSc:   2    PainLoc: Shoulder  Comment: right      Estimated body mass index is 26.46 kg/m² as calculated from the following:    Height as of this encounter: 177.8 cm (70\").    Weight as of this encounter: 83.6 kg (184 lb 6.4 oz).      Does the patient have evidence of cognitive impairment? No    Lab Results   Component Value Date    TRIG 360 (H) 12/11/2023    HDL 33 " (L) 2023    LDL 97 2023    VLDL 60 (H) 2023    HGBA1C 6.30 (H) 2023        HEALTH RISK ASSESSMENT    Smoking Status:  Social History     Tobacco Use   Smoking Status Never   Smokeless Tobacco Never     Alcohol Consumption:  Social History     Substance and Sexual Activity   Alcohol Use No     Fall Risk Screen:    CAROLINE Fall Risk Assessment was completed, and patient is at LOW risk for falls.Assessment completed on:2023    Depression Screenin/11/2023     3:38 PM   PHQ-2/PHQ-9 Depression Screening   Little Interest or Pleasure in Doing Things 0-->not at all   Feeling Down, Depressed or Hopeless 0-->not at all   PHQ-9: Brief Depression Severity Measure Score 0       Health Habits and Functional and Cognitive Screenin/11/2023     3:37 PM   Functional & Cognitive Status   Do you have difficulty preparing food and eating? No   Do you have difficulty bathing yourself, getting dressed or grooming yourself? No   Do you have difficulty using the toilet? No   Do you have difficulty moving around from place to place? No   Do you have trouble with steps or getting out of a bed or a chair? No   Current Diet Unhealthy Diet   Dental Exam Up to date   Eye Exam Up to date   Exercise (times per week) 3 times per week   Current Exercises Include Weightlifting   Do you need help using the phone?  No   Are you deaf or do you have serious difficulty hearing?  No   Do you need help to go to places out of walking distance? No   Do you need help shopping? No   Do you need help preparing meals?  No   Do you need help with housework?  No   Do you need help with laundry? No   Do you need help taking your medications? No   Do you need help managing money? No   Do you ever drive or ride in a car without wearing a seat belt? No   Have you felt unusual stress, anger or loneliness in the last month? No   Who do you live with? Spouse   If you need help, do you have trouble finding someone available  "to you? No   Have you been bothered in the last four weeks by sexual problems? No   Do you have difficulty concentrating, remembering or making decisions? No       Age-appropriate Screening Schedule:  Refer to the list below for future screening recommendations based on patient's age, sex and/or medical conditions. Orders for these recommended tests are listed in the plan section. The patient has been provided with a written plan.    Health Maintenance   Topic Date Due    COVID-19 Vaccine (4 - 2023-24 season) 09/01/2023    BMI FOLLOWUP  09/19/2024    ANNUAL WELLNESS VISIT  12/11/2024    COLORECTAL CANCER SCREENING  01/30/2030    TDAP/TD VACCINES (2 - Td or Tdap) 03/10/2032    HEPATITIS C SCREENING  Completed    INFLUENZA VACCINE  Completed    Pneumococcal Vaccine 65+  Completed    ZOSTER VACCINE  Completed                Physical Exam  Constitutional:       General: He is not in acute distress.     Appearance: He is not ill-appearing.   Cardiovascular:      Rate and Rhythm: Normal rate and regular rhythm.   Pulmonary:      Effort: Pulmonary effort is normal.      Breath sounds: Normal breath sounds.   Neurological:      Mental Status: He is alert.   Psychiatric:         Thought Content: Thought content normal.       Cyst/lesion to the left lower gum. (He states he has an appointment to discuss this with his dentist)    CMS Preventative Services Quick Reference  Risk Factors Identified During Encounter  None Identified  The above risks/problems have been discussed with the patient.  Pertinent information has been shared with the patient in the After Visit Summary.  An After Visit Summary and PPPS were made available to the patient.    Follow Up:   Next Medicare Wellness visit to be scheduled in 1 year.         Objective   Vital Signs:  /78   Pulse 89   Temp 98.6 °F (37 °C) (Temporal)   Resp 18   Ht 177.8 cm (70\")   Wt 83.6 kg (184 lb 6.4 oz)   SpO2 99%   BMI 26.46 kg/m²            Assessment and Plan "     Diagnoses and all orders for this visit:    1. Encounter for routine adult health examination without abnormal findings (Primary)  -     Comprehensive Metabolic Panel; Future  -     CBC (No Diff); Future  -     Hemoglobin A1c; Future  -     Lipid Panel; Future    2. Non-alcoholic fatty liver disease  -     Comprehensive Metabolic Panel; Future    3. Liver nodule  -     AFP Tumor Marker; Future    4. Prediabetes  Assessment & Plan:  Healthy diet      Orders:  -     Hemoglobin A1c; Future    5. Abnormal findings on diagnostic imaging of liver and biliary tract  -     AFP Tumor Marker; Future    6. Sciatica of left side  Assessment & Plan:  Much improved s/p PT       7. Prostate cancer  Overview:  S/p MRI spring of 23 monitoring PSA with Dr Spencer.       8. Hypertensive heart and kidney disease without heart failure and with stage 3a chronic kidney disease  Assessment & Plan:  Cont lisinopril and hctz           F/u colonoscopy in 2030     Annual physical exam was performed in addition to the Medicare wellness visit today we specifically reviewed healthy diet and physical activity, vaccines, screening test.  Wellness handout given.         Follow Up   Return in about 3 months (around 3/11/2024) for Follow-up.  Patient was given instructions and counseling regarding his condition or for health maintenance advice. Please see specific information pulled into the AVS if appropriate.     Evaristo Gaffney MD  Family Medicine - [facility]         negative

## 2023-12-12 LAB
ALBUMIN SERPL-MCNC: 4.8 G/DL (ref 3.5–5.2)
ALBUMIN/GLOB SERPL: 1.8 G/DL
ALP SERPL-CCNC: 67 U/L (ref 39–117)
ALPHA-FETOPROTEIN: <2 NG/ML (ref 0–8.3)
ALT SERPL W P-5'-P-CCNC: 39 U/L (ref 1–41)
ANION GAP SERPL CALCULATED.3IONS-SCNC: 13 MMOL/L (ref 5–15)
AST SERPL-CCNC: 29 U/L (ref 1–40)
BILIRUB SERPL-MCNC: 0.3 MG/DL (ref 0–1.2)
BUN SERPL-MCNC: 24 MG/DL (ref 8–23)
BUN/CREAT SERPL: 17.4 (ref 7–25)
CALCIUM SPEC-SCNC: 9.9 MG/DL (ref 8.6–10.5)
CHLORIDE SERPL-SCNC: 102 MMOL/L (ref 98–107)
CHOLEST SERPL-MCNC: 190 MG/DL (ref 0–200)
CO2 SERPL-SCNC: 24 MMOL/L (ref 22–29)
CREAT SERPL-MCNC: 1.38 MG/DL (ref 0.76–1.27)
DEPRECATED RDW RBC AUTO: 42 FL (ref 37–54)
EGFRCR SERPLBLD CKD-EPI 2021: 54.3 ML/MIN/1.73
ERYTHROCYTE [DISTWIDTH] IN BLOOD BY AUTOMATED COUNT: 12.4 % (ref 12.3–15.4)
GLOBULIN UR ELPH-MCNC: 2.7 GM/DL
GLUCOSE SERPL-MCNC: 134 MG/DL (ref 65–99)
HBA1C MFR BLD: 6.3 % (ref 4.8–5.6)
HCT VFR BLD AUTO: 50.9 % (ref 37.5–51)
HDLC SERPL-MCNC: 33 MG/DL (ref 40–60)
HGB BLD-MCNC: 17.5 G/DL (ref 13–17.7)
LDLC SERPL CALC-MCNC: 97 MG/DL (ref 0–100)
LDLC/HDLC SERPL: 2.58 {RATIO}
MCH RBC QN AUTO: 32.3 PG (ref 26.6–33)
MCHC RBC AUTO-ENTMCNC: 34.4 G/DL (ref 31.5–35.7)
MCV RBC AUTO: 93.9 FL (ref 79–97)
PLATELET # BLD AUTO: 201 10*3/MM3 (ref 140–450)
PMV BLD AUTO: 11.1 FL (ref 6–12)
POTASSIUM SERPL-SCNC: 4.1 MMOL/L (ref 3.5–5.2)
PROT SERPL-MCNC: 7.5 G/DL (ref 6–8.5)
RBC # BLD AUTO: 5.42 10*6/MM3 (ref 4.14–5.8)
SODIUM SERPL-SCNC: 139 MMOL/L (ref 136–145)
TRIGL SERPL-MCNC: 360 MG/DL (ref 0–150)
VLDLC SERPL-MCNC: 60 MG/DL (ref 5–40)
WBC NRBC COR # BLD AUTO: 8.74 10*3/MM3 (ref 3.4–10.8)

## 2023-12-21 DIAGNOSIS — K58.0 IRRITABLE BOWEL SYNDROME WITH DIARRHEA: ICD-10-CM

## 2023-12-21 RX ORDER — HYOSCYAMINE SULFATE 0.125 MG
125 TABLET ORAL EVERY 6 HOURS PRN
Qty: 360 TABLET | Refills: 3 | Status: SHIPPED | OUTPATIENT
Start: 2023-12-21

## 2024-01-02 DIAGNOSIS — K58.0 IRRITABLE BOWEL SYNDROME WITH DIARRHEA: ICD-10-CM

## 2024-01-02 RX ORDER — HYOSCYAMINE SULFATE 0.125 MG
125 TABLET ORAL EVERY 6 HOURS PRN
Qty: 360 TABLET | Refills: 3 | Status: SHIPPED | OUTPATIENT
Start: 2024-01-02

## 2024-01-02 NOTE — TELEPHONE ENCOUNTER
Rx Refill Note  Requested Prescriptions     Pending Prescriptions Disp Refills    hyoscyamine (ANASPAZ,LEVSIN) 0.125 MG tablet 360 tablet 3     Sig: Take 1 tablet by mouth Every 6 (Six) Hours As Needed for Cramping.      Last office visit with prescribing clinician: 12/11/2023   Last telemedicine visit with prescribing clinician: Visit date not found   Next office visit with prescribing clinician: 3/12/2024                         Would you like a call back once the refill request has been completed: [] Yes [] No    If the office needs to give you a call back, can they leave a voicemail: [] Yes [] No    Leeanna Chen MA  01/02/24, 10:24 EST

## 2024-01-26 DIAGNOSIS — K44.9 HIATAL HERNIA: ICD-10-CM

## 2024-01-26 RX ORDER — ESOMEPRAZOLE MAGNESIUM 40 MG/1
40 CAPSULE, DELAYED RELEASE ORAL
Qty: 90 CAPSULE | Refills: 3 | Status: SHIPPED | OUTPATIENT
Start: 2024-01-26 | End: 2024-01-29

## 2024-01-29 ENCOUNTER — OFFICE VISIT (OUTPATIENT)
Dept: FAMILY MEDICINE CLINIC | Facility: CLINIC | Age: 74
End: 2024-01-29
Payer: MEDICARE

## 2024-01-29 VITALS
BODY MASS INDEX: 26.08 KG/M2 | RESPIRATION RATE: 18 BRPM | OXYGEN SATURATION: 99 % | SYSTOLIC BLOOD PRESSURE: 128 MMHG | DIASTOLIC BLOOD PRESSURE: 78 MMHG | HEART RATE: 76 BPM | TEMPERATURE: 97.3 F | WEIGHT: 182.2 LBS | HEIGHT: 70 IN

## 2024-01-29 DIAGNOSIS — K21.9 GASTROESOPHAGEAL REFLUX DISEASE WITHOUT ESOPHAGITIS: ICD-10-CM

## 2024-01-29 DIAGNOSIS — M72.2 PLANTAR FASCIITIS OF LEFT FOOT: ICD-10-CM

## 2024-01-29 PROBLEM — N52.9 IMPOTENCE OF ORGANIC ORIGIN: Status: ACTIVE | Noted: 2019-12-19

## 2024-01-29 PROBLEM — N52.9 PRIMARY ERECTILE DYSFUNCTION: Status: ACTIVE | Noted: 2021-01-15

## 2024-01-29 PROBLEM — N40.1 BENIGN PROSTATIC HYPERPLASIA WITH URINARY OBSTRUCTION: Status: ACTIVE | Noted: 2019-12-19

## 2024-01-29 PROBLEM — N18.9 CHRONIC KIDNEY DISEASE: Status: ACTIVE | Noted: 2018-10-09

## 2024-01-29 PROBLEM — Q61.02 MULTIPLE RENAL CYSTS: Status: ACTIVE | Noted: 2018-10-29

## 2024-01-29 PROBLEM — C61 MALIGNANT TUMOR OF PROSTATE: Status: ACTIVE | Noted: 2018-02-24

## 2024-01-29 PROBLEM — N32.81 OVERACTIVE BLADDER: Status: ACTIVE | Noted: 2020-09-23

## 2024-01-29 PROBLEM — N41.0 ACUTE PROSTATITIS: Status: ACTIVE | Noted: 2019-01-16

## 2024-01-29 PROBLEM — I10 BENIGN HYPERTENSION: Status: ACTIVE | Noted: 2017-01-06

## 2024-01-29 PROBLEM — N20.1 URETERIC STONE: Status: ACTIVE | Noted: 2018-10-29

## 2024-01-29 PROBLEM — N13.8 BENIGN PROSTATIC HYPERPLASIA WITH URINARY OBSTRUCTION: Status: ACTIVE | Noted: 2019-12-19

## 2024-01-29 PROCEDURE — G2211 COMPLEX E/M VISIT ADD ON: HCPCS | Performed by: STUDENT IN AN ORGANIZED HEALTH CARE EDUCATION/TRAINING PROGRAM

## 2024-01-29 PROCEDURE — 99214 OFFICE O/P EST MOD 30 MIN: CPT | Performed by: STUDENT IN AN ORGANIZED HEALTH CARE EDUCATION/TRAINING PROGRAM

## 2024-01-29 PROCEDURE — 3074F SYST BP LT 130 MM HG: CPT | Performed by: STUDENT IN AN ORGANIZED HEALTH CARE EDUCATION/TRAINING PROGRAM

## 2024-01-29 PROCEDURE — 3078F DIAST BP <80 MM HG: CPT | Performed by: STUDENT IN AN ORGANIZED HEALTH CARE EDUCATION/TRAINING PROGRAM

## 2024-01-29 RX ORDER — DOXYCYCLINE 100 MG/1
CAPSULE ORAL
COMMUNITY
Start: 2023-10-12

## 2024-01-29 RX ORDER — TADALAFIL 5 MG/1
1 TABLET ORAL DAILY PRN
COMMUNITY
Start: 2023-07-21

## 2024-01-29 RX ORDER — ESOMEPRAZOLE MAGNESIUM 40 MG/1
40 CAPSULE, DELAYED RELEASE ORAL 2 TIMES DAILY
Qty: 180 CAPSULE | Refills: 0 | Status: SHIPPED | OUTPATIENT
Start: 2024-01-29

## 2024-01-29 RX ORDER — CLINDAMYCIN HYDROCHLORIDE 300 MG/1
1 CAPSULE ORAL 3 TIMES DAILY
COMMUNITY
Start: 2023-12-12

## 2024-01-29 RX ORDER — METHYLPREDNISOLONE 4 MG/1
TABLET ORAL
COMMUNITY
Start: 2023-12-12

## 2024-01-29 RX ORDER — HYDROCHLOROTHIAZIDE 12.5 MG/1
1 TABLET ORAL DAILY
COMMUNITY

## 2024-01-29 RX ORDER — GRANULES FOR ORAL 3 G/1
POWDER ORAL
COMMUNITY
Start: 2023-06-16

## 2024-01-29 NOTE — PROGRESS NOTES
Established Patient Office Visit        Subjective      Chief Complaint:  Heartburn (Increased acid reflux)      History of Present Illness: Ahmet Mariee is a 73 y.o. male who presents for worsening reflu. This is at night. Has cough with this. No new weightloss.         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    Polycythemia    Irritable bowel syndrome with diarrhea    Liver nodule    Sciatica of left side    Psychogenic fecal incontinence    Chronic prostatitis    Other insomnia    Non-alcoholic fatty liver disease    Prediabetes    Impotence of organic origin    Ureteric stone    Benign prostatic hyperplasia with urinary obstruction    Acute prostatitis    Chronic kidney disease    Chronic prostatitis    Multiple renal cysts    Overactive bladder    Primary erectile dysfunction    Benign hypertension    Malignant tumor of prostate         Current Outpatient Medications:     hydroCHLOROthiazide (HYDRODIURIL) 12.5 MG tablet, TAKE 1 TABLET DAILY, Disp: 90 tablet, Rfl: 3    hyoscyamine (ANASPAZ,LEVSIN) 0.125 MG tablet, Take 1 tablet by mouth Every 6 (Six) Hours As Needed for Cramping., Disp: 360 tablet, Rfl: 3    lisinopril (PRINIVIL,ZESTRIL) 30 MG tablet, TAKE 1 TABLET DAILY, Disp: 90 tablet, Rfl: 3    loperamide (IMODIUM A-D) 1 MG/7.5ML oral solution, Take  by mouth., Disp: , Rfl:     Omega-3 Fatty Acids (fish oil) 1000 MG capsule capsule, Fish Oil, Disp: , Rfl:     saccharomyces boulardii (FLORASTOR) 250 MG capsule, Take 1 capsule by mouth 2 (Two) Times a Day. [facility] ot daily, Disp: , Rfl:     Saw Palmetto 1000 MG capsule, Take  by mouth., Disp: , Rfl:      "tadalafil (CIALIS) 20 MG tablet, Take 1 tablet by mouth Daily., Disp: , Rfl:     tadalafil (CIALIS) 5 MG tablet, Take 1 tablet by mouth Daily As Needed., Disp: , Rfl:     traZODone (DESYREL) 50 MG tablet, TAKE 1 TO 2 TABLETS NIGHTLY as needed for sleep, Disp: 180 tablet, Rfl: 3    uribel (URO-MP) 118 MG capsule capsule, Take 1 capsule by mouth 4 (Four) Times a Day., Disp: 30 capsule, Rfl: 2    clindamycin (CLEOCIN) 300 MG capsule, Take 1 capsule by mouth 3 times a day. (Patient not taking: Reported on 1/29/2024), Disp: , Rfl:     doxycycline (MONODOX) 100 MG capsule, Take 1 capsule twice a day by oral route as directed for 21 days. (Patient not taking: Reported on 1/29/2024), Disp: , Rfl:     esomeprazole (nexIUM) 40 MG capsule, Take 1 capsule by mouth 2 (Two) Times a Day., Disp: 180 capsule, Rfl: 0    fosfomycin (MONUROL) 3 g pack, Take 1 packet by oral route as directed. (Patient not taking: Reported on 1/29/2024), Disp: , Rfl:     hydroCHLOROthiazide (HYDRODIURIL) 12.5 MG tablet, Take 1 tablet by mouth Daily. (Patient not taking: Reported on 1/29/2024), Disp: , Rfl:     methylPREDNISolone (MEDROL) 4 MG dose pack, TAKE 1 ROW OF TABLETS BY MOUTH EACH DAY AS INSTRUCTED ON THE PACKAGE  AS DIRECTED (Patient not taking: Reported on 1/29/2024), Disp: , Rfl:     tadalafil (CIALIS) 5 MG tablet, Take 1 tablet by mouth Daily. (Patient not taking: Reported on 1/29/2024), Disp: , Rfl:     triamcinolone (KENALOG) 0.1 % ointment, , Disp: , Rfl:        Objective     Physical Exam:   Vital Signs:   /78 (BP Location: Right arm, Patient Position: Sitting, Cuff Size: Adult)   Pulse 76   Temp 97.3 °F (36.3 °C) (Temporal)   Resp 18   Ht 177.8 cm (70\")   Wt 82.6 kg (182 lb 3.2 oz)   SpO2 99%   BMI 26.14 kg/m²      Physical Exam  Constitutional:       General: He is not in acute distress.     Appearance: He is not ill-appearing.   Cardiovascular:      Rate and Rhythm: Normal rate and regular rhythm.   Pulmonary:      " Effort: Pulmonary effort is normal.      Breath sounds: Normal breath sounds.   Neurological:      Mental Status: He is alert.   Psychiatric:         Thought Content: Thought content normal.   Abd soft nontender no epigastric pain          Assessment / Plan      Assessment/Plan:   Diagnoses and all orders for this visit:    1. Gastroesophageal reflux disease without esophagitis (Primary)  -     esomeprazole (nexIUM) 40 MG capsule; Take 1 capsule by mouth 2 (Two) Times a Day.  Dispense: 180 capsule; Refill: 0         Nexium 40 BID then plan to change to nexium 20 BID after 6 weeks. If not better at that point needs to go back to GI at . Has history of strangulated hiatal hernia. Seemed like lack of ideal visualization of full stomach on scope in 2023.     Discussed night time reflux precautions which he is following.     Follow Up:   No follow-ups on file.    MDM:     Evaristo Gaffney MD  Family Medicine - [facility] [place]

## 2024-01-29 NOTE — LETTER
January 29, 2024     Fish Hutchins MD  [address] [facility] [address]    Patient: Ahmet Mariee   YOB: 1950   Date of Visit: 1/29/2024       Dear Fish Hutchins MD    Ahmet Mariee was in my office today. He is complaining of worsening reflux at night with cough for the past week. I recommend he see you but he prefers to avoid repeat EGD if possible. I will increase nexium 40 MG BID for 6 weeks then decrease to 20 BID. If he has not improvement with this plan I have instructed him to see you again.     If you have questions, please do not hesitate to call me. I look forward to following Ahmet along with you.         Sincerely,        Evaristo Gaffney MD        CC: No Recipients      Established Patient Office Visit        Subjective      Chief Complaint:  Heartburn (Increased acid reflux)      History of Present Illness: Ahmet Mariee is a 73 y.o. male who presents for worsening reflu. This is at night. Has cough with this. No new weightloss.         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   • Polycythemia   • Irritable bowel syndrome with diarrhea   • Liver nodule   • Sciatica of left side   • Psychogenic fecal incontinence   • Chronic prostatitis   • Other insomnia   • Non-alcoholic fatty liver disease   • Prediabetes   • Impotence of organic origin   • Ureteric stone   • Benign prostatic hyperplasia with urinary obstruction   • Acute prostatitis   • Chronic kidney disease   • Chronic prostatitis   • Multiple renal cysts   • Overactive bladder   • Primary erectile dysfunction   • Benign hypertension   • Malignant tumor of prostate          Current Outpatient Medications:   •  hydroCHLOROthiazide (HYDRODIURIL) 12.5 MG tablet, TAKE 1 TABLET DAILY, Disp: 90 tablet, Rfl: 3  •  hyoscyamine (ANASPAZ,LEVSIN) 0.125 MG tablet, Take 1 tablet by mouth Every 6 (Six) Hours As Needed for Cramping., Disp: 360 tablet, Rfl: 3  •  lisinopril (PRINIVIL,ZESTRIL) 30 MG tablet, TAKE 1 TABLET DAILY, Disp: 90 tablet, Rfl: 3  •  loperamide (IMODIUM A-D) 1 MG/7.5ML oral solution, Take  by mouth., Disp: , Rfl:   •  Omega-3 Fatty Acids (fish oil) 1000 MG capsule capsule, Fish Oil, Disp: , Rfl:   •  saccharomyces boulardii (FLORASTOR) 250 MG capsule, Take 1 capsule by mouth 2 (Two) Times a Day. [facility] ot daily, Disp: , Rfl:   •  Saw Palmetto 1000 MG capsule, Take  by mouth., Disp: , Rfl:   •  tadalafil (CIALIS) 20 MG tablet, Take 1 tablet by mouth Daily., Disp: , Rfl:   •  tadalafil (CIALIS) 5 MG tablet, Take 1 tablet by mouth Daily As Needed., Disp: , Rfl:   •  traZODone (DESYREL) 50 MG tablet, TAKE 1 TO 2 TABLETS NIGHTLY as needed for sleep, Disp: 180 tablet, Rfl: 3  •  uribel (URO-MP) 118 MG capsule capsule, Take 1 capsule by mouth 4 (Four) Times a Day., Disp: 30 capsule, Rfl: 2  •  clindamycin (CLEOCIN) 300 MG capsule, Take 1 capsule by mouth 3 times a day. (Patient not taking: Reported on 1/29/2024), Disp: , Rfl:   •  doxycycline (MONODOX) 100 MG capsule, Take 1 capsule twice a day by oral route as directed for 21 days. (Patient not taking: Reported on 1/29/2024), Disp: , Rfl:   •  esomeprazole (nexIUM) 40 MG capsule, Take 1 capsule by mouth 2 (Two) Times a Day., Disp: 180 capsule, Rfl: 0  •  fosfomycin (MONUROL) 3 g pack, Take 1 packet by oral route as directed. (Patient not taking: Reported on 1/29/2024), Disp: , Rfl:   •  hydroCHLOROthiazide (HYDRODIURIL) 12.5 MG tablet, Take 1 tablet by mouth Daily. (Patient not taking: Reported on 1/29/2024), Disp: , Rfl:   •  methylPREDNISolone (MEDROL) 4 MG dose pack, TAKE 1 ROW OF TABLETS BY MOUTH EACH DAY AS INSTRUCTED  "ON THE PACKAGE  AS DIRECTED (Patient not taking: Reported on 1/29/2024), Disp: , Rfl:   •  tadalafil (CIALIS) 5 MG tablet, Take 1 tablet by mouth Daily. (Patient not taking: Reported on 1/29/2024), Disp: , Rfl:   •  triamcinolone (KENALOG) 0.1 % ointment, , Disp: , Rfl:        Objective     Physical Exam:   Vital Signs:   /78 (BP Location: Right arm, Patient Position: Sitting, Cuff Size: Adult)   Pulse 76   Temp 97.3 °F (36.3 °C) (Temporal)   Resp 18   Ht 177.8 cm (70\")   Wt 82.6 kg (182 lb 3.2 oz)   SpO2 99%   BMI 26.14 kg/m²      Physical Exam  Constitutional:       General: He is not in acute distress.     Appearance: He is not ill-appearing.   Cardiovascular:      Rate and Rhythm: Normal rate and regular rhythm.   Pulmonary:      Effort: Pulmonary effort is normal.      Breath sounds: Normal breath sounds.   Neurological:      Mental Status: He is alert.   Psychiatric:         Thought Content: Thought content normal.   Abd soft nontender no epigastric pain          Assessment / Plan      Assessment/Plan:   Diagnoses and all orders for this visit:    1. Gastroesophageal reflux disease without esophagitis (Primary)  -     esomeprazole (nexIUM) 40 MG capsule; Take 1 capsule by mouth 2 (Two) Times a Day.  Dispense: 180 capsule; Refill: 0         Nexium 40 BID then plan to change to nexium 20 BID after 6 weeks. If not better at that point needs to go back to GI at . Has history of strangulated hiatal hernia. Seemed like lack of ideal visualization of full stomach on scope in 2023.     Discussed night time reflux precautions which he is following.     Follow Up:   No follow-ups on file.    MDM:     Evaristo Gaffney MD  Family Medicine - [facility]  "

## 2024-03-19 ENCOUNTER — OFFICE VISIT (OUTPATIENT)
Dept: FAMILY MEDICINE CLINIC | Facility: CLINIC | Age: 74
End: 2024-03-19
Payer: MEDICARE

## 2024-03-19 VITALS
RESPIRATION RATE: 18 BRPM | SYSTOLIC BLOOD PRESSURE: 134 MMHG | TEMPERATURE: 97.3 F | HEART RATE: 70 BPM | WEIGHT: 181.2 LBS | HEIGHT: 70 IN | DIASTOLIC BLOOD PRESSURE: 84 MMHG | BODY MASS INDEX: 25.94 KG/M2 | OXYGEN SATURATION: 98 %

## 2024-03-19 DIAGNOSIS — G47.09 OTHER INSOMNIA: ICD-10-CM

## 2024-03-19 DIAGNOSIS — K76.89 LIVER NODULE: ICD-10-CM

## 2024-03-19 DIAGNOSIS — R79.89 INCREASE IN CREATININE: Primary | ICD-10-CM

## 2024-03-19 DIAGNOSIS — R73.9 HYPERGLYCEMIA: ICD-10-CM

## 2024-03-19 DIAGNOSIS — K21.9 GASTROESOPHAGEAL REFLUX DISEASE WITHOUT ESOPHAGITIS: ICD-10-CM

## 2024-03-19 DIAGNOSIS — I10 PRIMARY HYPERTENSION: ICD-10-CM

## 2024-03-19 DIAGNOSIS — R10.32 LEFT GROIN PAIN: ICD-10-CM

## 2024-03-19 PROBLEM — M54.50 LOW BACK PAIN: Status: ACTIVE | Noted: 2020-01-15

## 2024-03-19 PROBLEM — M75.110 PARTIAL THICKNESS ROTATOR CUFF TEAR: Status: ACTIVE | Noted: 2022-01-05

## 2024-03-19 PROBLEM — K40.90 RIGHT INGUINAL HERNIA: Status: ACTIVE | Noted: 2021-02-18

## 2024-03-19 PROBLEM — M25.511 PAIN IN JOINT OF RIGHT SHOULDER: Status: ACTIVE | Noted: 2020-02-06

## 2024-03-19 PROBLEM — Z00.00 ENCOUNTER FOR HEALTH MAINTENANCE EXAMINATION: Status: ACTIVE | Noted: 2018-10-09

## 2024-03-19 PROBLEM — M25.562 PAIN IN LEFT KNEE: Status: ACTIVE | Noted: 2020-01-15

## 2024-03-19 PROBLEM — M62.81 MUSCLE WEAKNESS: Status: ACTIVE | Noted: 2020-01-15

## 2024-03-19 PROBLEM — I13.10 HYPERTENSIVE HEART AND KIDNEY DISEASE WITHOUT HEART FAILURE AND WITH STAGE 3A CHRONIC KIDNEY DISEASE: Status: RESOLVED | Noted: 2021-10-07 | Resolved: 2024-03-19

## 2024-03-19 PROBLEM — R53.83 FATIGUE: Status: ACTIVE | Noted: 2018-10-09

## 2024-03-19 PROBLEM — N18.31 HYPERTENSIVE HEART AND KIDNEY DISEASE WITHOUT HEART FAILURE AND WITH STAGE 3A CHRONIC KIDNEY DISEASE: Status: RESOLVED | Noted: 2021-10-07 | Resolved: 2024-03-19

## 2024-03-19 PROBLEM — S83.289A TEAR OF LATERAL MENISCUS OF KNEE: Status: ACTIVE | Noted: 2020-01-15

## 2024-03-19 PROBLEM — Z85.46 HISTORY OF MALIGNANT NEOPLASM OF PROSTATE: Status: ACTIVE | Noted: 2018-10-09

## 2024-03-19 PROBLEM — M25.512 PAIN IN JOINT OF LEFT SHOULDER: Status: ACTIVE | Noted: 2022-01-05

## 2024-03-19 PROCEDURE — 99214 OFFICE O/P EST MOD 30 MIN: CPT | Performed by: STUDENT IN AN ORGANIZED HEALTH CARE EDUCATION/TRAINING PROGRAM

## 2024-03-19 PROCEDURE — 3075F SYST BP GE 130 - 139MM HG: CPT | Performed by: STUDENT IN AN ORGANIZED HEALTH CARE EDUCATION/TRAINING PROGRAM

## 2024-03-19 PROCEDURE — 3079F DIAST BP 80-89 MM HG: CPT | Performed by: STUDENT IN AN ORGANIZED HEALTH CARE EDUCATION/TRAINING PROGRAM

## 2024-03-19 RX ORDER — TRAZODONE HYDROCHLORIDE 50 MG/1
TABLET ORAL
Qty: 180 TABLET | Refills: 3 | Status: SHIPPED | OUTPATIENT
Start: 2024-03-19

## 2024-03-19 NOTE — PROGRESS NOTES
Established Patient Office Visit        Subjective      Chief Complaint:  Hypertension (3 month follow up, trazadone needed for mail order 90 day supply)      History of Present Illness: Ahmet Mariee is a 73 y.o. male who presents for HTN and reflux    Reflux greatly improved.     Some left groin pain. Can be worsening by ejaculation.    Patient Active Problem List   Diagnosis    Benign prostatic hyperplasia with lower urinary tract symptoms    Other male erectile dysfunction    Primary hypertension    Multiple renal cysts    OAB (overactive bladder)    Prostate cancer    Hiatal hernia    Gastroesophageal reflux disease without esophagitis    Polycythemia    Irritable bowel syndrome with diarrhea    Liver nodule    Sciatica of left side    Psychogenic fecal incontinence    Chronic prostatitis    Other insomnia    Non-alcoholic fatty liver disease    Prediabetes    Impotence of organic origin    Ureteric stone    Benign prostatic hyperplasia with urinary obstruction    Acute prostatitis    Chronic kidney disease    Chronic prostatitis    Multiple renal cysts    Overactive bladder    Primary erectile dysfunction    Benign hypertension    Malignant tumor of prostate    Abdominal pain    Diarrhea    Encounter for health maintenance examination    Fatigue    High prostate specific antigen (PSA)    History of malignant neoplasm of prostate    Hyperglycemia    Induratio penis plastica    Low back pain    Muscle weakness    Nausea    Pain in joint of left shoulder    Pain in joint of right shoulder    Pain in left knee    Partial thickness rotator cuff tear    Right inguinal hernia    Tear of lateral meniscus of knee         Current Outpatient Medications:     hydroCHLOROthiazide (HYDRODIURIL) 12.5 MG tablet, TAKE 1 TABLET DAILY, Disp: 90 tablet, Rfl: 3    hyoscyamine (ANASPAZ,LEVSIN) 0.125 MG tablet, Take 1 tablet by mouth Every 6 (Six) Hours As Needed for Cramping., Disp: 360 tablet, Rfl: 3    lisinopril  "(PRINIVIL,ZESTRIL) 30 MG tablet, TAKE 1 TABLET DAILY, Disp: 90 tablet, Rfl: 3    loperamide (IMODIUM A-D) 1 MG/7.5ML oral solution, Take  by mouth., Disp: , Rfl:     Omega-3 Fatty Acids (fish oil) 1000 MG capsule capsule, Patient takes two a day of this., Disp: , Rfl:     saccharomyces boulardii (FLORASTOR) 250 MG capsule, Take 1 capsule by mouth 2 (Two) Times a Day. [facility] ot daily takes one a day, Disp: , Rfl:     tadalafil (CIALIS) 20 MG tablet, Take 1 tablet by mouth Daily., Disp: , Rfl:     tadalafil (CIALIS) 5 MG tablet, Take 1 tablet by mouth Daily., Disp: , Rfl:     traZODone (DESYREL) 50 MG tablet, TAKE 1 TO 2 TABLETS NIGHTLY as needed for sleep, Disp: 180 tablet, Rfl: 3    triamcinolone (KENALOG) 0.1 % ointment, , Disp: , Rfl:     uribel (URO-MP) 118 MG capsule capsule, Take 1 capsule by mouth 4 (Four) Times a Day., Disp: 30 capsule, Rfl: 2    esomeprazole (nexIUM) 20 MG capsule, Take 1 capsule by mouth 2 (Two) Times a Day., Disp: 180 capsule, Rfl: 3       Objective     Physical Exam:   Vital Signs:   /84 (BP Location: Left arm, Patient Position: Sitting, Cuff Size: Adult)   Pulse 70   Temp 97.3 °F (36.3 °C) (Temporal)   Resp 18   Ht 177.8 cm (70\")   Wt 82.2 kg (181 lb 3.2 oz)   SpO2 98%   BMI 26.00 kg/m²      Physical Exam  Constitutional:       General: He is not in acute distress.     Appearance: He is not ill-appearing.   Cardiovascular:      Rate and Rhythm: Normal rate and regular rhythm.   Pulmonary:      Effort: Pulmonary effort is normal.      Breath sounds: Normal breath sounds.   Neurological:      Mental Status: He is alert.   Psychiatric:         Thought Content: Thought content normal.   No hernia, no scrotal tenderness   No epigastric tenderness            Assessment / Plan      Assessment/Plan:   Diagnoses and all orders for this visit:    1. Gastroesophageal reflux disease without esophagitis  Assessment & Plan:  -did better with nexium 40 mg BID   -decrease to nexium 20 " BID for long term control  -keep f/u with GI   -elevated head of bead     Orders:  -     esomeprazole (nexIUM) 20 MG capsule; Take 1 capsule by mouth 2 (Two) Times a Day.  Dispense: 180 capsule; Refill: 3    2. Liver nodule  Assessment & Plan:  Stable no changes on MRI reviewed today       3. Other insomnia  -     traZODone (DESYREL) 50 MG tablet; TAKE 1 TO 2 TABLETS NIGHTLY as needed for sleep  Dispense: 180 tablet; Refill: 3  -discussed risk of meds     4. Left groin pain  No hernia, f/u with urology for worsening.       Follow Up:   Return in about 3 months (around 6/19/2024) for Follow-up.    MDM:     Evaristo Gaffney MD  Family Medicine - [facility] [place]

## 2024-03-19 NOTE — ASSESSMENT & PLAN NOTE
-did better with nexium 40 mg BID   -decrease to nexium 20 BID for long term controll  -keep f/u with GI   -elevated head of bead

## 2024-04-10 ENCOUNTER — TELEPHONE (OUTPATIENT)
Dept: FAMILY MEDICINE CLINIC | Facility: CLINIC | Age: 74
End: 2024-04-10
Payer: MEDICARE

## 2024-04-10 NOTE — TELEPHONE ENCOUNTER
Patient called in today wanted to make sure that you got the following message regarding his prior authorization he said that he called his insurance this morning and they told him that they did not receive anything from us.         I called [facility] and they said your office needs to contact their prior authorization dept at [phone]. Insurance will pay for one capsule a day and Dr. Loera has to tell why I need to take 2 a day. Then they will be able to fill script for 90 day supply on regular basis. It makes a world of difference to take one in morning and one in the evening. I had no relief with one coughing and throwing up most of the night!   Please let me know if you have any questions. Thank you

## 2024-04-18 ENCOUNTER — APPOINTMENT (OUTPATIENT)
Dept: GENERAL RADIOLOGY | Facility: HOSPITAL | Age: 74
End: 2024-04-18
Payer: MEDICARE

## 2024-04-18 ENCOUNTER — HOSPITAL ENCOUNTER (OUTPATIENT)
Facility: HOSPITAL | Age: 74
Setting detail: OBSERVATION
Discharge: HOME | End: 2024-04-19
Attending: STUDENT IN AN ORGANIZED HEALTH CARE EDUCATION/TRAINING PROGRAM | Admitting: INTERNAL MEDICINE
Payer: MEDICARE

## 2024-04-18 ENCOUNTER — APPOINTMENT (OUTPATIENT)
Dept: CT IMAGING | Facility: HOSPITAL | Age: 74
End: 2024-04-18
Payer: MEDICARE

## 2024-04-18 DIAGNOSIS — R29.810 FACIAL DROOP: ICD-10-CM

## 2024-04-18 DIAGNOSIS — R29.90 STROKE-LIKE SYMPTOMS: Primary | ICD-10-CM

## 2024-04-18 DIAGNOSIS — R20.0 RIGHT FACIAL NUMBNESS: ICD-10-CM

## 2024-04-18 PROBLEM — G47.00 INSOMNIA: Status: ACTIVE | Noted: 2023-10-19

## 2024-04-18 PROBLEM — N17.9 AKI (ACUTE KIDNEY INJURY): Status: ACTIVE | Noted: 2024-04-18

## 2024-04-18 LAB
ALBUMIN SERPL-MCNC: 4.5 G/DL (ref 3.5–5.2)
ALBUMIN/GLOB SERPL: 1.4 G/DL
ALP SERPL-CCNC: 58 U/L (ref 39–117)
ALT SERPL W P-5'-P-CCNC: 32 U/L (ref 1–41)
ALT SERPL W P-5'-P-CCNC: 32 U/L (ref 1–41)
ANION GAP SERPL CALCULATED.3IONS-SCNC: 15 MMOL/L (ref 5–15)
APTT PPP: 31.1 SECONDS (ref 22–39)
AST SERPL-CCNC: 24 U/L (ref 1–40)
AST SERPL-CCNC: 24 U/L (ref 1–40)
BASOPHILS # BLD AUTO: 0.05 10*3/MM3 (ref 0–0.2)
BASOPHILS NFR BLD AUTO: 0.7 % (ref 0–1.5)
BILIRUB SERPL-MCNC: 0.5 MG/DL (ref 0–1.2)
BUN BLDA-MCNC: 26 MG/DL (ref 8–26)
BUN SERPL-MCNC: 26 MG/DL (ref 8–23)
BUN/CREAT SERPL: 20.2 (ref 7–25)
CA-I BLDA-SCNC: 1.18 MMOL/L (ref 1.2–1.32)
CALCIUM SPEC-SCNC: 9.9 MG/DL (ref 8.6–10.5)
CHLORIDE BLDA-SCNC: 101 MMOL/L (ref 98–109)
CHLORIDE SERPL-SCNC: 101 MMOL/L (ref 98–107)
CO2 BLDA-SCNC: 26 MMOL/L (ref 24–29)
CO2 SERPL-SCNC: 21 MMOL/L (ref 22–29)
CREAT BLDA-MCNC: 1.5 MG/DL (ref 0.6–1.3)
CREAT SERPL-MCNC: 1.29 MG/DL (ref 0.76–1.27)
DEPRECATED RDW RBC AUTO: 42.9 FL (ref 37–54)
EGFRCR SERPLBLD CKD-EPI 2021: 49.2 ML/MIN/1.73
EGFRCR SERPLBLD CKD-EPI 2021: 58.9 ML/MIN/1.73
EOSINOPHIL # BLD AUTO: 0.24 10*3/MM3 (ref 0–0.4)
EOSINOPHIL NFR BLD AUTO: 3.1 % (ref 0.3–6.2)
ERYTHROCYTE [DISTWIDTH] IN BLOOD BY AUTOMATED COUNT: 12.3 % (ref 12.3–15.4)
GLOBULIN UR ELPH-MCNC: 3.3 GM/DL
GLUCOSE BLDC GLUCOMTR-MCNC: 106 MG/DL (ref 70–130)
GLUCOSE SERPL-MCNC: 105 MG/DL (ref 65–99)
HCT VFR BLD AUTO: 52.4 % (ref 37.5–51)
HCT VFR BLDA CALC: 52 % (ref 38–51)
HGB BLD-MCNC: 17.6 G/DL (ref 13–17.7)
HGB BLDA-MCNC: 17.7 G/DL (ref 12–17)
HOLD SPECIMEN: NORMAL
HOLD SPECIMEN: NORMAL
IMM GRANULOCYTES # BLD AUTO: 0.02 10*3/MM3 (ref 0–0.05)
IMM GRANULOCYTES NFR BLD AUTO: 0.3 % (ref 0–0.5)
LYMPHOCYTES # BLD AUTO: 1.48 10*3/MM3 (ref 0.7–3.1)
LYMPHOCYTES NFR BLD AUTO: 19.3 % (ref 19.6–45.3)
MAGNESIUM SERPL-MCNC: 2.1 MG/DL (ref 1.6–2.4)
MCH RBC QN AUTO: 31.9 PG (ref 26.6–33)
MCHC RBC AUTO-ENTMCNC: 33.6 G/DL (ref 31.5–35.7)
MCV RBC AUTO: 94.9 FL (ref 79–97)
MONOCYTES # BLD AUTO: 0.83 10*3/MM3 (ref 0.1–0.9)
MONOCYTES NFR BLD AUTO: 10.8 % (ref 5–12)
NEUTROPHILS NFR BLD AUTO: 5.06 10*3/MM3 (ref 1.7–7)
NEUTROPHILS NFR BLD AUTO: 65.8 % (ref 42.7–76)
NRBC BLD AUTO-RTO: 0 /100 WBC (ref 0–0.2)
PHOSPHATE SERPL-MCNC: 3.7 MG/DL (ref 2.5–4.5)
PLATELET # BLD AUTO: 166 10*3/MM3 (ref 140–450)
PMV BLD AUTO: 10.2 FL (ref 6–12)
POTASSIUM BLDA-SCNC: 3.7 MMOL/L (ref 3.5–4.9)
POTASSIUM SERPL-SCNC: 3.8 MMOL/L (ref 3.5–5.2)
PROT SERPL-MCNC: 7.8 G/DL (ref 6–8.5)
RBC # BLD AUTO: 5.52 10*6/MM3 (ref 4.14–5.8)
SODIUM BLD-SCNC: 139 MMOL/L (ref 138–146)
SODIUM SERPL-SCNC: 137 MMOL/L (ref 136–145)
TROPONIN T SERPL HS-MCNC: 9 NG/L
WBC NRBC COR # BLD AUTO: 7.68 10*3/MM3 (ref 3.4–10.8)
WHOLE BLOOD HOLD COAG: NORMAL
WHOLE BLOOD HOLD SPECIMEN: NORMAL

## 2024-04-18 PROCEDURE — 70496 CT ANGIOGRAPHY HEAD: CPT

## 2024-04-18 PROCEDURE — 0042T HC CT CEREBRAL PERFUSION W/WO CONTRAST: CPT

## 2024-04-18 PROCEDURE — 70498 CT ANGIOGRAPHY NECK: CPT

## 2024-04-18 PROCEDURE — 93005 ELECTROCARDIOGRAM TRACING: CPT | Performed by: STUDENT IN AN ORGANIZED HEALTH CARE EDUCATION/TRAINING PROGRAM

## 2024-04-18 PROCEDURE — 99291 CRITICAL CARE FIRST HOUR: CPT

## 2024-04-18 PROCEDURE — 36415 COLL VENOUS BLD VENIPUNCTURE: CPT

## 2024-04-18 PROCEDURE — 85730 THROMBOPLASTIN TIME PARTIAL: CPT | Performed by: STUDENT IN AN ORGANIZED HEALTH CARE EDUCATION/TRAINING PROGRAM

## 2024-04-18 PROCEDURE — 71045 X-RAY EXAM CHEST 1 VIEW: CPT

## 2024-04-18 PROCEDURE — G0378 HOSPITAL OBSERVATION PER HR: HCPCS

## 2024-04-18 PROCEDURE — 84484 ASSAY OF TROPONIN QUANT: CPT | Performed by: STUDENT IN AN ORGANIZED HEALTH CARE EDUCATION/TRAINING PROGRAM

## 2024-04-18 PROCEDURE — 85025 COMPLETE CBC W/AUTO DIFF WBC: CPT | Performed by: STUDENT IN AN ORGANIZED HEALTH CARE EDUCATION/TRAINING PROGRAM

## 2024-04-18 PROCEDURE — 96360 HYDRATION IV INFUSION INIT: CPT

## 2024-04-18 PROCEDURE — 70450 CT HEAD/BRAIN W/O DYE: CPT

## 2024-04-18 PROCEDURE — 25510000001 IOPAMIDOL PER 1 ML: Performed by: STUDENT IN AN ORGANIZED HEALTH CARE EDUCATION/TRAINING PROGRAM

## 2024-04-18 PROCEDURE — 80053 COMPREHEN METABOLIC PANEL: CPT | Performed by: STUDENT IN AN ORGANIZED HEALTH CARE EDUCATION/TRAINING PROGRAM

## 2024-04-18 PROCEDURE — 99222 1ST HOSP IP/OBS MODERATE 55: CPT | Performed by: NURSE PRACTITIONER

## 2024-04-18 PROCEDURE — 84100 ASSAY OF PHOSPHORUS: CPT | Performed by: INTERNAL MEDICINE

## 2024-04-18 PROCEDURE — 83735 ASSAY OF MAGNESIUM: CPT | Performed by: INTERNAL MEDICINE

## 2024-04-18 PROCEDURE — 85014 HEMATOCRIT: CPT | Performed by: STUDENT IN AN ORGANIZED HEALTH CARE EDUCATION/TRAINING PROGRAM

## 2024-04-18 PROCEDURE — 99285 EMERGENCY DEPT VISIT HI MDM: CPT

## 2024-04-18 PROCEDURE — 80047 BASIC METABLC PNL IONIZED CA: CPT | Performed by: STUDENT IN AN ORGANIZED HEALTH CARE EDUCATION/TRAINING PROGRAM

## 2024-04-18 PROCEDURE — 25810000003 SODIUM CHLORIDE 0.9 % SOLUTION: Performed by: NURSE PRACTITIONER

## 2024-04-18 RX ORDER — SODIUM CHLORIDE 9 MG/ML
40 INJECTION, SOLUTION INTRAVENOUS AS NEEDED
Status: DISCONTINUED | OUTPATIENT
Start: 2024-04-18 | End: 2024-04-18 | Stop reason: SDUPTHER

## 2024-04-18 RX ORDER — SODIUM CHLORIDE 0.9 % (FLUSH) 0.9 %
10 SYRINGE (ML) INJECTION EVERY 12 HOURS SCHEDULED
Status: DISCONTINUED | OUTPATIENT
Start: 2024-04-19 | End: 2024-04-18 | Stop reason: SDUPTHER

## 2024-04-18 RX ORDER — BISACODYL 5 MG/1
5 TABLET, DELAYED RELEASE ORAL DAILY PRN
Status: DISCONTINUED | OUTPATIENT
Start: 2024-04-18 | End: 2024-04-19 | Stop reason: HOSPADM

## 2024-04-18 RX ORDER — ONDANSETRON 4 MG/1
4 TABLET, ORALLY DISINTEGRATING ORAL EVERY 6 HOURS PRN
Status: DISCONTINUED | OUTPATIENT
Start: 2024-04-18 | End: 2024-04-19 | Stop reason: HOSPADM

## 2024-04-18 RX ORDER — ACETAMINOPHEN 160 MG/5ML
650 SOLUTION ORAL EVERY 4 HOURS PRN
Status: DISCONTINUED | OUTPATIENT
Start: 2024-04-18 | End: 2024-04-19 | Stop reason: HOSPADM

## 2024-04-18 RX ORDER — SODIUM CHLORIDE 9 MG/ML
40 INJECTION, SOLUTION INTRAVENOUS AS NEEDED
Status: DISCONTINUED | OUTPATIENT
Start: 2024-04-18 | End: 2024-04-19 | Stop reason: HOSPADM

## 2024-04-18 RX ORDER — ATORVASTATIN CALCIUM 40 MG/1
80 TABLET, FILM COATED ORAL NIGHTLY
Status: DISCONTINUED | OUTPATIENT
Start: 2024-04-19 | End: 2024-04-19

## 2024-04-18 RX ORDER — CLOPIDOGREL BISULFATE 75 MG/1
75 TABLET ORAL DAILY
Status: DISCONTINUED | OUTPATIENT
Start: 2024-04-19 | End: 2024-04-19

## 2024-04-18 RX ORDER — ACETAMINOPHEN 325 MG/1
650 TABLET ORAL EVERY 4 HOURS PRN
Status: DISCONTINUED | OUTPATIENT
Start: 2024-04-18 | End: 2024-04-19 | Stop reason: HOSPADM

## 2024-04-18 RX ORDER — AMOXICILLIN 250 MG
2 CAPSULE ORAL 2 TIMES DAILY PRN
Status: DISCONTINUED | OUTPATIENT
Start: 2024-04-18 | End: 2024-04-19 | Stop reason: HOSPADM

## 2024-04-18 RX ORDER — CHOLECALCIFEROL (VITAMIN D3) 125 MCG
5 CAPSULE ORAL NIGHTLY PRN
Status: DISCONTINUED | OUTPATIENT
Start: 2024-04-18 | End: 2024-04-19 | Stop reason: HOSPADM

## 2024-04-18 RX ORDER — SODIUM CHLORIDE 0.9 % (FLUSH) 0.9 %
10 SYRINGE (ML) INJECTION EVERY 12 HOURS SCHEDULED
Status: DISCONTINUED | OUTPATIENT
Start: 2024-04-19 | End: 2024-04-19 | Stop reason: HOSPADM

## 2024-04-18 RX ORDER — CLOPIDOGREL BISULFATE 75 MG/1
300 TABLET ORAL ONCE
Status: COMPLETED | OUTPATIENT
Start: 2024-04-18 | End: 2024-04-18

## 2024-04-18 RX ORDER — ASPIRIN 300 MG/1
300 SUPPOSITORY RECTAL DAILY
Status: DISCONTINUED | OUTPATIENT
Start: 2024-04-18 | End: 2024-04-19

## 2024-04-18 RX ORDER — ASPIRIN 81 MG/1
81 TABLET, CHEWABLE ORAL DAILY
Status: DISCONTINUED | OUTPATIENT
Start: 2024-04-18 | End: 2024-04-19

## 2024-04-18 RX ORDER — POLYETHYLENE GLYCOL 3350 17 G/17G
17 POWDER, FOR SOLUTION ORAL DAILY PRN
Status: DISCONTINUED | OUTPATIENT
Start: 2024-04-18 | End: 2024-04-19 | Stop reason: HOSPADM

## 2024-04-18 RX ORDER — SILDENAFIL CITRATE 20 MG/1
5 TABLET ORAL DAILY
Status: DISCONTINUED | OUTPATIENT
Start: 2024-04-19 | End: 2024-04-19 | Stop reason: HOSPADM

## 2024-04-18 RX ORDER — ACETAMINOPHEN 650 MG/1
650 SUPPOSITORY RECTAL EVERY 4 HOURS PRN
Status: DISCONTINUED | OUTPATIENT
Start: 2024-04-18 | End: 2024-04-19 | Stop reason: HOSPADM

## 2024-04-18 RX ORDER — SODIUM CHLORIDE 0.9 % (FLUSH) 0.9 %
10 SYRINGE (ML) INJECTION AS NEEDED
Status: DISCONTINUED | OUTPATIENT
Start: 2024-04-18 | End: 2024-04-19 | Stop reason: HOSPADM

## 2024-04-18 RX ORDER — SODIUM CHLORIDE 0.9 % (FLUSH) 0.9 %
10 SYRINGE (ML) INJECTION AS NEEDED
Status: DISCONTINUED | OUTPATIENT
Start: 2024-04-18 | End: 2024-04-18 | Stop reason: SDUPTHER

## 2024-04-18 RX ORDER — ONDANSETRON 2 MG/ML
4 INJECTION INTRAMUSCULAR; INTRAVENOUS EVERY 6 HOURS PRN
Status: DISCONTINUED | OUTPATIENT
Start: 2024-04-18 | End: 2024-04-19 | Stop reason: HOSPADM

## 2024-04-18 RX ORDER — BISACODYL 10 MG
10 SUPPOSITORY, RECTAL RECTAL DAILY PRN
Status: DISCONTINUED | OUTPATIENT
Start: 2024-04-18 | End: 2024-04-19 | Stop reason: HOSPADM

## 2024-04-18 RX ORDER — PANTOPRAZOLE SODIUM 40 MG/1
40 TABLET, DELAYED RELEASE ORAL
Status: DISCONTINUED | OUTPATIENT
Start: 2024-04-19 | End: 2024-04-19 | Stop reason: HOSPADM

## 2024-04-18 RX ADMIN — ASPIRIN 81 MG: 81 TABLET, CHEWABLE ORAL at 21:34

## 2024-04-18 RX ADMIN — SODIUM CHLORIDE 500 ML: 9 INJECTION, SOLUTION INTRAVENOUS at 21:33

## 2024-04-18 RX ADMIN — CLOPIDOGREL BISULFATE 300 MG: 75 TABLET ORAL at 21:33

## 2024-04-18 RX ADMIN — IOPAMIDOL 115 ML: 755 INJECTION, SOLUTION INTRAVENOUS at 20:29

## 2024-04-19 ENCOUNTER — APPOINTMENT (OUTPATIENT)
Dept: CARDIOLOGY | Facility: HOSPITAL | Age: 74
End: 2024-04-19
Payer: MEDICARE

## 2024-04-19 ENCOUNTER — READMISSION MANAGEMENT (OUTPATIENT)
Dept: CALL CENTER | Facility: HOSPITAL | Age: 74
End: 2024-04-19
Payer: MEDICARE

## 2024-04-19 ENCOUNTER — TELEPHONE (OUTPATIENT)
Dept: FAMILY MEDICINE CLINIC | Facility: CLINIC | Age: 74
End: 2024-04-19

## 2024-04-19 ENCOUNTER — APPOINTMENT (OUTPATIENT)
Dept: MRI IMAGING | Facility: HOSPITAL | Age: 74
End: 2024-04-19
Payer: MEDICARE

## 2024-04-19 VITALS
WEIGHT: 182 LBS | DIASTOLIC BLOOD PRESSURE: 83 MMHG | SYSTOLIC BLOOD PRESSURE: 143 MMHG | HEART RATE: 74 BPM | BODY MASS INDEX: 26.05 KG/M2 | TEMPERATURE: 97.5 F | OXYGEN SATURATION: 96 % | HEIGHT: 70 IN | RESPIRATION RATE: 16 BRPM

## 2024-04-19 LAB
ANION GAP SERPL CALCULATED.3IONS-SCNC: 10 MMOL/L (ref 5–15)
BASOPHILS # BLD AUTO: 0.04 10*3/MM3 (ref 0–0.2)
BASOPHILS NFR BLD AUTO: 0.7 % (ref 0–1.5)
BUN SERPL-MCNC: 23 MG/DL (ref 8–23)
BUN/CREAT SERPL: 18.5 (ref 7–25)
CALCIUM SPEC-SCNC: 8.9 MG/DL (ref 8.6–10.5)
CHLORIDE SERPL-SCNC: 103 MMOL/L (ref 98–107)
CHOLEST SERPL-MCNC: 145 MG/DL (ref 0–200)
CO2 SERPL-SCNC: 26 MMOL/L (ref 22–29)
CREAT SERPL-MCNC: 1.24 MG/DL (ref 0.76–1.27)
DEPRECATED RDW RBC AUTO: 41.1 FL (ref 37–54)
EGFRCR SERPLBLD CKD-EPI 2021: 61.8 ML/MIN/1.73
EOSINOPHIL # BLD AUTO: 0.2 10*3/MM3 (ref 0–0.4)
EOSINOPHIL NFR BLD AUTO: 3.6 % (ref 0.3–6.2)
ERYTHROCYTE [DISTWIDTH] IN BLOOD BY AUTOMATED COUNT: 12.1 % (ref 12.3–15.4)
GLUCOSE BLDC GLUCOMTR-MCNC: 124 MG/DL (ref 70–130)
GLUCOSE BLDC GLUCOMTR-MCNC: 127 MG/DL (ref 70–130)
GLUCOSE SERPL-MCNC: 115 MG/DL (ref 65–99)
HBA1C MFR BLD: 5.9 % (ref 4.8–5.6)
HCT VFR BLD AUTO: 44.4 % (ref 37.5–51)
HDLC SERPL-MCNC: 33 MG/DL (ref 40–60)
HGB BLD-MCNC: 15.3 G/DL (ref 13–17.7)
HOLD SPECIMEN: NORMAL
IMM GRANULOCYTES # BLD AUTO: 0.02 10*3/MM3 (ref 0–0.05)
IMM GRANULOCYTES NFR BLD AUTO: 0.4 % (ref 0–0.5)
LDLC SERPL CALC-MCNC: 82 MG/DL (ref 0–100)
LDLC/HDLC SERPL: 2.36 {RATIO}
LYMPHOCYTES # BLD AUTO: 1.32 10*3/MM3 (ref 0.7–3.1)
LYMPHOCYTES NFR BLD AUTO: 24 % (ref 19.6–45.3)
MAGNESIUM SERPL-MCNC: 1.9 MG/DL (ref 1.6–2.4)
MCH RBC QN AUTO: 31.7 PG (ref 26.6–33)
MCHC RBC AUTO-ENTMCNC: 34.5 G/DL (ref 31.5–35.7)
MCV RBC AUTO: 91.9 FL (ref 79–97)
MONOCYTES # BLD AUTO: 0.79 10*3/MM3 (ref 0.1–0.9)
MONOCYTES NFR BLD AUTO: 14.3 % (ref 5–12)
NEUTROPHILS NFR BLD AUTO: 3.14 10*3/MM3 (ref 1.7–7)
NEUTROPHILS NFR BLD AUTO: 57 % (ref 42.7–76)
NRBC BLD AUTO-RTO: 0 /100 WBC (ref 0–0.2)
PLATELET # BLD AUTO: 144 10*3/MM3 (ref 140–450)
PMV BLD AUTO: 10.5 FL (ref 6–12)
POTASSIUM SERPL-SCNC: 3.9 MMOL/L (ref 3.5–5.2)
RBC # BLD AUTO: 4.83 10*6/MM3 (ref 4.14–5.8)
SODIUM SERPL-SCNC: 139 MMOL/L (ref 136–145)
TRIGL SERPL-MCNC: 171 MG/DL (ref 0–150)
VLDLC SERPL-MCNC: 30 MG/DL (ref 5–40)
WBC NRBC COR # BLD AUTO: 5.51 10*3/MM3 (ref 3.4–10.8)

## 2024-04-19 PROCEDURE — 97165 OT EVAL LOW COMPLEX 30 MIN: CPT

## 2024-04-19 PROCEDURE — 80061 LIPID PANEL: CPT | Performed by: NURSE PRACTITIONER

## 2024-04-19 PROCEDURE — 63710000001 PREDNISONE PER 1 MG: Performed by: NURSE PRACTITIONER

## 2024-04-19 PROCEDURE — 99213 OFFICE O/P EST LOW 20 MIN: CPT | Performed by: STUDENT IN AN ORGANIZED HEALTH CARE EDUCATION/TRAINING PROGRAM

## 2024-04-19 PROCEDURE — G0378 HOSPITAL OBSERVATION PER HR: HCPCS

## 2024-04-19 PROCEDURE — 99239 HOSP IP/OBS DSCHRG MGMT >30: CPT | Performed by: INTERNAL MEDICINE

## 2024-04-19 PROCEDURE — 83036 HEMOGLOBIN GLYCOSYLATED A1C: CPT | Performed by: NURSE PRACTITIONER

## 2024-04-19 PROCEDURE — 92610 EVALUATE SWALLOWING FUNCTION: CPT | Performed by: SPEECH-LANGUAGE PATHOLOGIST

## 2024-04-19 PROCEDURE — 0 GADOBENATE DIMEGLUMINE 529 MG/ML SOLUTION: Performed by: INTERNAL MEDICINE

## 2024-04-19 PROCEDURE — 80048 BASIC METABOLIC PNL TOTAL CA: CPT | Performed by: NURSE PRACTITIONER

## 2024-04-19 PROCEDURE — 85025 COMPLETE CBC W/AUTO DIFF WBC: CPT | Performed by: NURSE PRACTITIONER

## 2024-04-19 PROCEDURE — 83735 ASSAY OF MAGNESIUM: CPT | Performed by: NURSE PRACTITIONER

## 2024-04-19 PROCEDURE — 82948 REAGENT STRIP/BLOOD GLUCOSE: CPT

## 2024-04-19 PROCEDURE — 92523 SPEECH SOUND LANG COMPREHEN: CPT | Performed by: SPEECH-LANGUAGE PATHOLOGIST

## 2024-04-19 PROCEDURE — 97161 PT EVAL LOW COMPLEX 20 MIN: CPT

## 2024-04-19 PROCEDURE — A9577 INJ MULTIHANCE: HCPCS | Performed by: INTERNAL MEDICINE

## 2024-04-19 PROCEDURE — 70553 MRI BRAIN STEM W/O & W/DYE: CPT

## 2024-04-19 RX ORDER — VALACYCLOVIR HYDROCHLORIDE 500 MG/1
1000 TABLET, FILM COATED ORAL 3 TIMES DAILY
Qty: 42 TABLET | Refills: 0 | Status: DISCONTINUED | OUTPATIENT
Start: 2024-04-19 | End: 2024-04-19 | Stop reason: HOSPADM

## 2024-04-19 RX ORDER — ATORVASTATIN CALCIUM 40 MG/1
40 TABLET, FILM COATED ORAL NIGHTLY
Status: DISCONTINUED | OUTPATIENT
Start: 2024-04-20 | End: 2024-04-19 | Stop reason: HOSPADM

## 2024-04-19 RX ORDER — PREDNISONE 20 MG/1
60 TABLET ORAL
Status: DISCONTINUED | OUTPATIENT
Start: 2024-04-20 | End: 2024-04-19 | Stop reason: HOSPADM

## 2024-04-19 RX ORDER — VALACYCLOVIR HYDROCHLORIDE 1 G/1
1000 TABLET, FILM COATED ORAL 3 TIMES DAILY
Qty: 21 TABLET | Refills: 0 | Status: CANCELLED | OUTPATIENT
Start: 2024-04-19 | End: 2024-04-26

## 2024-04-19 RX ORDER — ASPIRIN 81 MG/1
81 TABLET ORAL DAILY
Status: DISCONTINUED | OUTPATIENT
Start: 2024-04-20 | End: 2024-04-19 | Stop reason: HOSPADM

## 2024-04-19 RX ORDER — PREDNISONE 20 MG/1
60 TABLET ORAL
Qty: 21 TABLET | Refills: 0 | Status: SHIPPED | OUTPATIENT
Start: 2024-04-20 | End: 2024-04-27

## 2024-04-19 RX ORDER — ATORVASTATIN CALCIUM 40 MG/1
40 TABLET, FILM COATED ORAL NIGHTLY
Qty: 90 TABLET | Refills: 0 | Status: SHIPPED | OUTPATIENT
Start: 2024-04-20

## 2024-04-19 RX ORDER — CLOPIDOGREL BISULFATE 75 MG/1
75 TABLET ORAL DAILY
Qty: 90 TABLET | Refills: 0 | Status: SHIPPED | OUTPATIENT
Start: 2024-04-20

## 2024-04-19 RX ORDER — VALACYCLOVIR HYDROCHLORIDE 1 G/1
1000 TABLET, FILM COATED ORAL 3 TIMES DAILY
Qty: 21 TABLET | Refills: 0 | Status: SHIPPED | OUTPATIENT
Start: 2024-04-19 | End: 2024-04-26

## 2024-04-19 RX ORDER — TRAZODONE HYDROCHLORIDE 50 MG/1
50 TABLET ORAL NIGHTLY
Status: DISCONTINUED | OUTPATIENT
Start: 2024-04-19 | End: 2024-04-19 | Stop reason: HOSPADM

## 2024-04-19 RX ORDER — PREDNISONE 20 MG/1
60 TABLET ORAL ONCE
Status: COMPLETED | OUTPATIENT
Start: 2024-04-19 | End: 2024-04-19

## 2024-04-19 RX ORDER — ASPIRIN 81 MG/1
81 TABLET ORAL DAILY
Qty: 90 TABLET | Refills: 0 | Status: SHIPPED | OUTPATIENT
Start: 2024-04-20 | End: 2024-07-19

## 2024-04-19 RX ORDER — CLOPIDOGREL BISULFATE 75 MG/1
75 TABLET ORAL DAILY
Status: DISCONTINUED | OUTPATIENT
Start: 2024-04-20 | End: 2024-04-19 | Stop reason: HOSPADM

## 2024-04-19 RX ADMIN — GADOBENATE DIMEGLUMINE 15 ML: 529 INJECTION, SOLUTION INTRAVENOUS at 11:10

## 2024-04-19 RX ADMIN — POLYVINYL ALCOHOL, POVIDONE 1 DROP: 14; 6 SOLUTION/ DROPS OPHTHALMIC at 12:48

## 2024-04-19 RX ADMIN — VALACYCLOVIR HYDROCHLORIDE 1000 MG: 500 TABLET, FILM COATED ORAL at 16:55

## 2024-04-19 RX ADMIN — Medication 10 ML: at 00:00

## 2024-04-19 RX ADMIN — PREDNISONE 60 MG: 20 TABLET ORAL at 09:22

## 2024-04-19 RX ADMIN — Medication 10 ML: at 09:22

## 2024-04-19 RX ADMIN — CLOPIDOGREL BISULFATE 75 MG: 75 TABLET ORAL at 09:22

## 2024-04-19 RX ADMIN — SILDENAFIL 5 MG: 20 TABLET ORAL at 09:22

## 2024-04-19 RX ADMIN — PANTOPRAZOLE SODIUM 40 MG: 40 TABLET, DELAYED RELEASE ORAL at 06:17

## 2024-04-19 RX ADMIN — ASPIRIN 81 MG: 81 TABLET, CHEWABLE ORAL at 09:22

## 2024-04-19 NOTE — OUTREACH NOTE
Prep Survey      Flowsheet Row Responses   [facility] patient discharged from? [facility]   Is LACE score < 7 ? No   Eligibility [facility]   Date of Admission 04/18/24   Date of Discharge 04/19/24   Discharge Disposition Home or Self Care   Discharge diagnosis Stroke-like symptoms   Does the patient have one of the following disease processes/diagnoses(primary or secondary)? Other   Does the patient have Home health ordered? No   Is there a DME ordered? No   Comments regarding appointments f/u with Stroke Neurology in 3 months.   Medication alerts for this patient see AVS   Prep survey completed? Yes            Gaye OSULLIVAN - Registered Nurse

## 2024-04-19 NOTE — CASE MANAGEMENT/SOCIAL WORK
Discharge Planning Assessment  [facility]     Patient Name: Ahmet Mariee  MRN: [id number]  Today's Date: 4/19/2024    Admit Date: 4/18/2024    Plan: Home   Discharge Needs Assessment       Row Name 04/19/24 1223       Living Environment    People in Home spouse    Current Living Arrangements home    Potentially Unsafe Housing Conditions none    Primary Care Provided by self    Provides Primary Care For no one    Quality of Family Relationships supportive    Able to Return to Prior Arrangements yes       Resource/Environmental Concerns    Resource/Environmental Concerns none    Transportation Concerns none       Transition Planning    Patient/Family Anticipates Transition to home    Patient/Family Anticipated Services at Transition none    Transportation Anticipated family or friend will provide       Discharge Needs Assessment    Readmission Within the Last 30 Days no previous admission in last 30 days    Equipment Currently Used at Home none    Concerns to be Addressed no discharge needs identified    Anticipated Changes Related to Illness none    Equipment Needed After Discharge none    Provided Post Acute Provider List? N/A    N/A Provider List Comment Does not anticipate any discharge needs at this time    Provided Post Acute Provider Quality & Resource List? N/A                   Discharge Plan       Row Name 04/19/24 1224       Plan    Plan Home    Patient/Family in Agreement with Plan yes    Provided Post Acute Provider List? N/A    Provided Post Acute Provider Quality & Resource List? N/A    Plan Comments MSW spoke with pt at bedside. Pt is independent in ADL's and IADL's. Pt's PCP is Evaristo Gaffney. Pt has prescription coverage with his health insurance. Pt lives in [place] with his wife. Pt had no current discharge needs or concerns. Pt plans to discharge home with his wife to provide transportation when ready.    Final Discharge Disposition Code 01 - home or self-care                   Continued Care and Services - Admitted Since 4/18/2024    No active coordination exists for this encounter.       Expected Discharge Date and Time       Expected Discharge Date Expected Discharge Time    Apr 19, 2024            Demographic Summary       Row Name 04/19/24 1223       General Information    Reason for Consult discharge planning                   Functional Status       Row Name 04/19/24 1223       Functional Status, IADL    Medications independent    Meal Preparation independent    Housekeeping independent    Laundry independent    Shopping independent                   Psychosocial    No documentation.                  Abuse/Neglect    No documentation.                  Legal    No documentation.                  Substance Abuse    No documentation.                  Patient Forms    No documentation.                     SANDEEP Manriquez

## 2024-04-19 NOTE — PLAN OF CARE
Goal Outcome Evaluation:  Plan of Care Reviewed With: patient           Outcome Evaluation: Pt. presents below baseline function w/generalized weakness affecting his ability to safely participate in functional mobility. He performed transfers and ambulated 300' w/contact guard assist. Activity limited by fatigue. Pt. would benefit from IPPT to address stated deficits.      Anticipated Discharge Disposition (PT): home with assist

## 2024-04-19 NOTE — DISCHARGE SUMMARY
[facility] Medicine Services  DISCHARGE SUMMARY    Patient Name: Ahmet Mariee  : 1950  MRN: [id number]    Date of Admission: 2024  8:07 PM  Date of Discharge:  2024  Primary Care Physician: Evaristo Gaffney MD    Consults       Date and Time Order Name Status Description    2024 11:50 PM Inpatient Neurology Consult Stroke      2024  8:07 PM Inpatient Neurology Consult Stroke Completed             Hospital Course     Presenting Problem: Right Facial Drooping    Active Hospital Problems    Diagnosis  POA    **Stroke-like symptoms [R29.90]  Yes    PELON (acute kidney injury) [N17.9]  Unknown    Insomnia [G47.00]  Yes    Irritable bowel syndrome with diarrhea [K58.0]  Yes    Primary hypertension [I10]  Yes    Prostate cancer [C61]  Yes    Gastroesophageal reflux disease without esophagitis [K21.9]  Yes    Facial droop [R29.810]  Yes      Resolved Hospital Problems   No resolved problems to display.          Hospital Course:  Ahmet Mariee is a 73 y.o. male with PMHx prostate cancer, pancreatic insufficiency, irritable bowel syndrome, acid reflux and hypertension who presents to the ED for evaluation of right sided facial droop.      Right facial droop - Bell's Palsy  - stroke neurology evaluated, findings consistent with Bell's Palsy, recommended Valtrex and Prednisone x 7 days, f/u with PCP within one week  - CTH, CTP, CTA H/N per below  - MRI brain negative    R ICA Stenosis  - CTA H/N notable for IC stenosis, recommend ASA/plavix x 3 months with outpatient stroke clinic f/u in 3 months  - continue statin  - Echocardiogram to be completed as outpatient  - PT/OT okay for d/c home, cleared for diet by SLP     Elevated Creatinine  - appears close to baseline     HTN  - resume home regimen     Prostate cancer  - follows with Dr. Spencer     GERD  - continue PPI     Insomnia  - continue home trazodone     IBS  - uses imodium ~ every other day at home, hold for  now  - levsin prn    Discharge Follow Up Recommendations for outpatient labs/diagnostics:  - f/u with stroke clinic in 3 months  - PCP in one week    Day of Discharge     HPI:   Patient feels well. He still has right facial/mouth drooping and some blurred vision in right eye    Review of Systems  Gen- No fevers, chills  CV- No chest pain, palpitations  Resp- No cough, dyspnea  GI- No N/V/D, abd pain      Vital Signs:   Temp:  [97.5 °F (36.4 °C)-97.9 °F (36.6 °C)] 97.5 °F (36.4 °C)  Heart Rate:  [46-74] 74  Resp:  [16-18] 16  BP: (131-162)/(82-99) 143/83      Physical Exam:  Constitutional: No acute distress, awake, alert  HENT: NCAT, mucous membranes moist  Respiratory: Clear to auscultation bilaterally, respiratory effort normal   Cardiovascular: RRR, no murmurs, rubs, or gallops  Gastrointestinal: soft, nontender, nondistended  Musculoskeletal: No bilateral ankle edema  Psychiatric: Appropriate affect, cooperative  Neurologic: Oriented x 3, speech clear, right facial drooping  Skin: No rashes      Pertinent  and/or Most Recent Results     LAB RESULTS:      Lab 04/19/24 0340 04/18/24 2016 04/18/24 2010   WBC 5.51  --  7.68   HEMOGLOBIN 15.3  --  17.6   HEMOGLOBIN, POC  --  17.7*  --    HEMATOCRIT 44.4  --  52.4*   HEMATOCRIT POC  --  52*  --    PLATELETS 144  --  166   NEUTROS ABS 3.14  --  5.06   IMMATURE GRANS (ABS) 0.02  --  0.02   LYMPHS ABS 1.32  --  1.48   MONOS ABS 0.79  --  0.83   EOS ABS 0.20  --  0.24   MCV 91.9  --  94.9   APTT  --   --  31.1         Lab 04/19/24 0340 04/18/24 2016 04/18/24 2010   SODIUM 139  --  137   POTASSIUM 3.9  --  3.8   CHLORIDE 103  --  101   CO2 26.0  --  21.0*   ANION GAP 10.0  --  15.0   BUN 23  --  26*   CREATININE 1.24 1.50* 1.29*   EGFR 61.8 49.2* 58.9*   GLUCOSE 115*  --  105*   CALCIUM 8.9  --  9.9   MAGNESIUM 1.9  --  2.1   PHOSPHORUS  --   --  3.7   HEMOGLOBIN A1C 5.90*  --   --          Lab 04/18/24 2010   TOTAL PROTEIN 7.8   ALBUMIN 4.5   GLOBULIN 3.3   ALT  (SGPT) 32  32   AST (SGOT) 24  24   BILIRUBIN 0.5   ALK PHOS 58         Lab 04/18/24 2010   HSTROP T 9         Lab 04/19/24  0340   CHOLESTEROL 145   LDL CHOL 82   HDL CHOL 33*   TRIGLYCERIDES 171*             Brief Urine Lab Results  (Last result in the past 365 days)        Color   Clarity   Blood   Leuk Est   Nitrite   Protein   CREAT   Urine HCG        06/09/23 0916 Green  Comment: Any Substance that causes an abnormal urine color can alter the accuracy of the chemical reactions.   Clear   Trace   Negative   Negative   30 mg/dL (1+)                 Microbiology Results (last 10 days)       ** No results found for the last 240 hours. **            MRI Brain With & Without Contrast    Result Date: 4/19/2024  MRI BRAIN W WO CONTRAST Date of Exam: 4/19/2024 10:37 AM EDT Indication: Stroke, follow up.  Comparison: CTs dated 4/18/2024 Technique:  Routine multiplanar/multisequence sequence images of the brain were obtained before and after the uneventful administration of 15 cc Multihance. Findings: There is no diffusion restriction to suggest acute infarct. There is no evidence of acute or chronic intracranial hemorrhage. No mass effect or midline shift. No abnormal extra-axial collections. There is mild to moderate nonspecific nodular subcortical and periventricular white matter signal abnormality without mass effect nor enhancement, most frequently seen in the setting of microvascular ischemic disease. There is a 9 mm maximum diameter dural based nodule overlying the right frontal lobe consistent with meningioma (image 87, series 13). No similar findings elsewhere. The basal ganglia, brainstem and cerebellum appear within normal limits. Midline structures are intact. No significant cortical abnormality is identified. Calvarial and superficial soft tissue signal is within normal limits. Orbits appear unremarkable. The paranasal sinuses and the mastoid air cells appear well aerated. There is no additional abnormal  intracranial enhancement. There is normal enhancement within the intracranial vasculature including the dural venous sinuses.     Impression: 1.No evidence of acute infarction or other acute process. 2.Benign 9 mm meningioma overlying the cephalad right frontal lobe. 3.Mild to moderate nonspecific white matter changes most frequently seen with microvascular ischemic disease. Electronically Signed: Ez Grayson MD  4/19/2024 11:20 AM EDT  Workstation ID: [id number]    XR Chest 1 View    Result Date: 4/18/2024  XR CHEST 1 VW Date of Exam: 4/18/2024 8:43 PM EDT Indication: Acute Stroke Protocol (onset < 12 hrs) Comparison: Chest x-ray 5/27/2013 Findings: Normal cardiomediastinal silhouette. The lungs are clear. No pleural effusion or pneumothorax. No acute osseous findings.     Impression: No acute cardiopulmonary findings. Electronically Signed: Roni Tan MD  4/18/2024 9:09 PM EDT  Workstation ID: [id number]    CT Angiogram Head w AI Analysis of LVO    Result Date: 4/18/2024  CT ANGIOGRAM HEAD W AI ANALYSIS OF LVO, CT ANGIOGRAM NECK Date of Exam: 4/18/2024 8:18 PM EDT Indication: Neuro deficit, acute stroke suspected Neuro deficit, acute stroke suspected. Comparison: Same day CT head and CT perfusion Technique: CTA of the head was performed after the uneventful intravenous administration of 115 cc Isovue-370. Reconstructed coronal and sagittal images were also obtained. In addition, a 3-D volume rendered image was created for interpretation. Automated exposure control and iterative reconstruction methods were used. Findings: Contrast opacification: Adequate Aortic Arch: Unremarkable Right: Innominate: Patent Subclavian: Patent Common Carotid: Patent External Carotid:Patent Internal Carotid: Calcified and noncalcified plaques noted proximally causing approximately 40% stenosis by NASCET criteria. Intracranial ICA: Patent MCA:Patent SG: Patent PCA: Patent Vertebral: Patent Left: Subclavian: Patent Common Carotid:  Vascular calcifications noted at the origin with possible mild stenosis. Otherwise unremarkable External Carotid:Patent Internal Carotid: Patent Intracranial ICA: Patent MCA:Patent SG: Patent PCA: Patent Vertebral: Patent Basilar: Patent Soft Tissue: Unremarkable Lungs: Unremarkable Bones: Small calcified granuloma within the left lung apex.     Impression: No evidence of vessel occlusion, severe stenosis, aneurysm or dissection of the arterial vessels of the head and neck. Mild, approximately 40%, stenosis of the proximal right cervical internal carotid artery secondary to calcified and noncalcified plaques. Possible mild stenosis at the origin of the left common carotid artery secondary to predominately calcified plaques. Electronically Signed: Jose Spencer DO  4/18/2024 8:47 PM EDT  Workstation ID: [id number]    CT Angiogram Neck    Result Date: 4/18/2024  CT ANGIOGRAM HEAD W AI ANALYSIS OF LVO, CT ANGIOGRAM NECK Date of Exam: 4/18/2024 8:18 PM EDT Indication: Neuro deficit, acute stroke suspected Neuro deficit, acute stroke suspected. Comparison: Same day CT head and CT perfusion Technique: CTA of the head was performed after the uneventful intravenous administration of 115 cc Isovue-370. Reconstructed coronal and sagittal images were also obtained. In addition, a 3-D volume rendered image was created for interpretation. Automated exposure control and iterative reconstruction methods were used. Findings: Contrast opacification: Adequate Aortic Arch: Unremarkable Right: Innominate: Patent Subclavian: Patent Common Carotid: Patent External Carotid:Patent Internal Carotid: Calcified and noncalcified plaques noted proximally causing approximately 40% stenosis by NASCET criteria. Intracranial ICA: Patent MCA:Patent SG: Patent PCA: Patent Vertebral: Patent Left: Subclavian: Patent Common Carotid: Vascular calcifications noted at the origin with possible mild stenosis. Otherwise unremarkable External  Carotid:Patent Internal Carotid: Patent Intracranial ICA: Patent MCA:Patent SG: Patent PCA: Patent Vertebral: Patent Basilar: Patent Soft Tissue: Unremarkable Lungs: Unremarkable Bones: Small calcified granuloma within the left lung apex.     Impression: No evidence of vessel occlusion, severe stenosis, aneurysm or dissection of the arterial vessels of the head and neck. Mild, approximately 40%, stenosis of the proximal right cervical internal carotid artery secondary to calcified and noncalcified plaques. Possible mild stenosis at the origin of the left common carotid artery secondary to predominately calcified plaques. Electronically Signed: Jose Spencer DO  4/18/2024 8:47 PM EDT  Workstation ID: [id number]    CT CEREBRAL PERFUSION WITH & WITHOUT CONTRAST    Result Date: 4/18/2024  CT CEREBRAL PERFUSION W WO CONTRAST Date of Exam: 4/18/2024 8:18 PM EDT Indication: Neuro deficit, acute stroke suspected.  Comparison: Same day head CT Technique: Axial CT images of the brain were obtained prior to and after the administration of 115 cc Isovue-370. Core blood volume, core blood flow, mean transit time, and Tmax images were obtained utilizing the Rapid software protocol. A limited CT angiogram of the head was also performed to measure the blood vessel density. The radiation dose reduction device was turned on for each scan per the ALARA (As Low as Reasonably Achievable) protocol. Findings: Adequate perfusion images. Adequate ROIs. No significant motion artifact. CBF less than 30%: 0 mL Tmax greater than 6 seconds: 3 mL Mismatch volume: 3 mL Mismatch ratio: Infinite An area of Tmax greater than 6 seconds is noted within the left temporal lobe with an approximate volume of 3 mL.     Impression: No core infarct. No definite ischemia. A small area of Tmax greater than 6 seconds is noted within the left temporal lobe with an approximate volume of 3 mL, which is presumed to be artifactual. If further evaluation of this  region is warranted, please consider follow-up with dedicated MRI Electronically Signed: Jose Spencer DO  4/18/2024 8:37 PM EDT  Workstation ID: [id number]    CT Head Without Contrast Stroke Protocol    Result Date: 4/18/2024  CT HEAD WO CONTRAST STROKE PROTOCOL Date of Exam: 4/18/2024 8:08 PM EDT Indication: Neuro deficit, acute, stroke suspected Neuro deficit, acute stroke suspected. Comparison: None available. Technique: Axial CT images were obtained of the head without contrast administration.  Reconstructed coronal images were also obtained. Automated exposure control and iterative construction methods were used. Scan Time: 2015 Results discussed with Dr. Haro at 8:19 a.m. on 4/18/2024. Findings: No intracranial hemorrhage. Gray-white matter differentiation is maintained without evidence of an acute infarction. Multiple foci of decreased attenuation are present within the subcortical, deep cerebral, and periventricular white matter consistent with chronic small vessel/microangiopathic ischemic changes. No extra-axial mass or collection. The ventricles and sulci are prominent commensurate with involutional changes. The posterior fossa appears grossly normal. Sellar and suprasellar structures are normal. Lens replacements. Mucosal thickening in the inferior maxillary sinuses.. The bony calvarium is intact.     Impression: No acute intracranial pathology. Electronically Signed: Alvarez Wilkerson MD  4/18/2024 8:22 PM EDT  Workstation ID: [id number]               Plan for Follow-up of Pending Labs/Results:     Discharge Details        Discharge Medications        New Medications        Instructions Start Date   aspirin 81 MG EC tablet   81 mg, Oral, Daily   Start Date: April 20, 2024     atorvastatin 40 MG tablet  Commonly known as: LIPITOR   40 mg, Oral, Nightly   Start Date: April 20, 2024     clopidogrel 75 MG tablet  Commonly known as: PLAVIX   75 mg, Oral, Daily   Start Date: April 20, 2024     predniSONE 20 MG  tablet  Commonly known as: DELTASONE   60 mg, Oral, Daily With Breakfast   Start Date: April 20, 2024     valACYclovir 1000 MG tablet  Commonly known as: VALTREX   1,000 mg, Oral, 3 times daily             Continue These Medications        Instructions Start Date   esomeprazole 20 MG capsule  Commonly known as: nexIUM   20 mg, Oral, 2 Times Daily      fish oil 1000 MG capsule capsule   Patient takes two a day of this.      hydroCHLOROthiazide 12.5 MG tablet   TAKE 1 TABLET DAILY      hyoscyamine 0.125 MG tablet  Commonly known as: ANASPAZ,LEVSIN   125 mcg, Oral, Every 6 Hours PRN      lisinopril 30 MG tablet  Commonly known as: PRINIVIL,ZESTRIL   TAKE 1 TABLET DAILY      loperamide 1 MG/7.5ML oral solution  Commonly known as: IMODIUM A-D   1 mg, Oral, 4 Times Daily PRN      saccharomyces boulardii 250 MG capsule  Commonly known as: FLORASTOR   250 mg, Oral, Daily, [place] otc daily takes one a day      tadalafil 20 MG tablet  Commonly known as: CIALIS   20 mg, Oral, Daily      traZODone 50 MG tablet  Commonly known as: DESYREL   TAKE 1 TO 2 TABLETS NIGHTLY as needed for sleep      triamcinolone 0.1 % ointment  Commonly known as: KENALOG       uribel 118 MG capsule capsule   118 mg, Oral, 4 Times Daily               Allergies   Allergen Reactions    Sulfa Antibiotics Rash         Discharge Disposition:  Home or Self Care    Diet:  Hospital:  Diet Order   Procedures    Diet: Cardiac; Healthy Heart (2-3 Na+); Texture: Regular (IDDSI 7); Fluid Consistency: Thin (IDDSI 0)            Activity:  - as tolerated    Restrictions or Other Recommendations:  - none       CODE STATUS:    Code Status and Medical Interventions:   Ordered at: 04/18/24 4824     Code Status (Patient has no pulse and is not breathing):    CPR (Attempt to Resuscitate)     Medical Interventions (Patient has pulse or is breathing):    Full Support       Future Appointments   Date Time Provider Department Center   4/25/2024  1:00 PM Evaristo Gaffney MD  [facility]   6/19/2024  8:45 AM Evaristo Gaffney MD [facility]       Additional Instructions for the Follow-ups that You Need to Schedule       Discharge Follow-up with Specialty: Stroke Neurology; 3 Months   As directed      Specialty: Stroke Neurology   Follow Up: 3 Months                      Veronica Baron DO  04/19/24      Time Spent on Discharge:  I spent  40  minutes on this discharge activity which included: face-to-face encounter with the patient, reviewing the data in the system, coordination of the care with the nursing staff as well as consultants, documentation, and entering orders.

## 2024-04-19 NOTE — THERAPY EVALUATION
Acute Care - Speech Language Pathology   Swallow Initial Evaluation [facility]  Clinical Swallow Evaluation  +Cognitive-Communication Evaluation       Patient Name: Ahmet Mariee  : 1950  MRN: [id number]  Today's Date: 2024               Admit Date: 2024    Visit Dx:     ICD-10-CM ICD-9-CM   1. Stroke-like symptoms  R29.90 781.99   2. Facial droop  R29.810 781.94   3. Right facial numbness  R20.0 782.0     Patient Active Problem List   Diagnosis    Benign prostatic hyperplasia with lower urinary tract symptoms    Other male erectile dysfunction    Primary hypertension    Multiple renal cysts    OAB (overactive bladder)    Prostate cancer    Hiatal hernia    Gastroesophageal reflux disease without esophagitis    Polycythemia    Irritable bowel syndrome with diarrhea    Liver nodule    Sciatica of left side    Psychogenic fecal incontinence    Chronic prostatitis    Insomnia    Non-alcoholic fatty liver disease    Prediabetes    Impotence of organic origin    Ureteric stone    Benign prostatic hyperplasia with urinary obstruction    Acute prostatitis    Chronic kidney disease    Chronic prostatitis    Multiple renal cysts    Overactive bladder    Primary erectile dysfunction    Benign hypertension    Malignant tumor of prostate    Abdominal pain    Diarrhea    Encounter for health maintenance examination    Fatigue    High prostate specific antigen (PSA)    History of malignant neoplasm of prostate    Hyperglycemia    Induratio penis plastica    Low back pain    Facial droop    Nausea    Pain in joint of left shoulder    Pain in joint of right shoulder    Pain in left knee    Partial thickness rotator cuff tear    Right inguinal hernia    Tear of lateral meniscus of knee    Stroke-like symptoms    PELNO (acute kidney injury)     Past Medical History:   Diagnosis Date    Benign prostatic hyperplasia     Cancer     prostate, slow growing, monitored by urology, Naveed Spencer MD    GERD  (gastroesophageal reflux disease)     Hypertension     Injury of left rotator cuff     partial thickness rotator cuff tear    Irritable bowel syndrome      Past Surgical History:   Procedure Laterality Date    CATARACT EXTRACTION W/ INTRAOCULAR LENS  IMPLANT, BILATERAL Bilateral 2022    CYST REMOVAL      corner of right eye    EYE SURGERY Bilateral     cataract surgery    HERNIA REPAIR  05/2013    hiatal    INGUINAL HERNIA REPAIR  05/2021    bilateral    KNEE CARTILAGE SURGERY  2008    left knee meniscus repair    PARTIAL GASTRECTOMY  05/2013    severe hiatal hernia, emergency surgery     UMBILICAL HERNIA REPAIR  12/2013    UMBILICAL HERNIA REPAIR      VASECTOMY  1990       SLP Recommendation and Plan  SLP Swallowing Diagnosis: R/O pharyngeal dysphagia (04/19/24 0830)  SLP Diet Recommendation: regular textures, thin liquids (04/19/24 0830)  Recommended Precautions and Strategies: upright posture during/after eating, general aspiration precautions (04/19/24 0830)  SLP Rec. for Method of Medication Administration: meds whole, with puree, as tolerated (04/19/24 0830)     Monitor for Signs of Aspiration: yes, notify SLP if any concerns (04/19/24 0830)  Recommended Diagnostics: other (see comments) (diet tolerance) (04/19/24 0830)  Swallow Criteria for Skilled Therapeutic Interventions Met: demonstrates skilled criteria (04/19/24 0830)  Anticipated Discharge Disposition (SLP): home with OP services (04/19/24 0830)  Rehab Potential/Prognosis, Swallowing: good, to achieve stated therapy goals (04/19/24 0830)  Therapy Frequency (Swallow): PRN (04/19/24 0830)  Predicted Duration Therapy Intervention (Days): 1 week (04/19/24 0830)  Oral Care Recommendations: Oral Care BID/PRN, Toothbrush (04/19/24 0830)              Plan of Care Reviewed With: patient  Progress:  (initial eval)      SWALLOW EVALUATION (Last 72 Hours)       SLP Adult Swallow Evaluation       Row Name 04/19/24 0830       Rehab Evaluation    Document Type  evaluation  -CJ    Subjective Information no complaints  -CJ    Patient Observations alert;cooperative  -CJ    Patient/Family/Caregiver Comments/Observations no family present  -CJ    Patient Effort good  -CJ    Symptoms Noted During/After Treatment none  -CJ       General Information    Patient Profile Reviewed yes  -CJ    Pertinent History Of Current Problem Pt adm on stroke pathway w/ facial droop; sig h/o HTN, GERD, BPH, prostate cancer, CKD. MRI pending  -CJ    Current Method of Nutrition NPO  -CJ    Precautions/Limitations, Vision WFL;for purposes of eval  -CJ    Precautions/Limitations, Hearing WFL;for purposes of eval  -CJ    Prior Level of Function-Communication WFL  IND w/ all adls  -CJ    Prior Level of Function-Swallowing no diet consistency restrictions  -CJ    Plans/Goals Discussed with patient;agreed upon  -CJ    Barriers to Rehab none identified  -CJ    Patient's Goals for Discharge return to regular diet  -CJ       Pain    Additional Documentation Pain Scale: FACES Pre/Post-Treatment (Group)  -CJ       Pain Scale: FACES Pre/Post-Treatment    Pain: FACES Scale, Pretreatment 0-->no hurt  -CJ    Posttreatment Pain Rating 0-->no hurt  -CJ       Oral Motor Structure and Function    Dentition Assessment natural, present and adequate  -CJ    Secretion Management WNL/WFL  -CJ    Mucosal Quality moist, healthy  -CJ       Oral Musculature and Cranial Nerve Assessment    Oral Motor General Assessment oral labial or buccal impairment  -CJ    Oral Labial or Buccal Impairment, Detail, Cranial Nerve VII (Facial): right labial droop  -CJ       General Eating/Swallowing Observations    Respiratory Support Currently in Use room air  -CJ    Eating/Swallowing Skills self-fed;appropriate self-feeding skills observed  -CJ    Positioning During Eating upright in bed  -CJ    Utensils Used spoon;cup;straw  -CJ    Consistencies Trialed regular textures;pureed;ice chips;thin liquids  -CJ       Clinical Swallow Eval     Pharyngeal Phase --  r/o pharyngeal impairment  -    Clinical Swallow Evaluation Summary Noted R facial droop w/ decreased sensation. Adequate mastication w/ solids. No glaring overt s/s of aspiration but given significant droop and MRI still pending will f/u for diet tolerance. Okay for regular diet w/ thin liquids  -       SLP Evaluation Clinical Impression    SLP Swallowing Diagnosis R/O pharyngeal dysphagia  -    Functional Impact risk of aspiration/pneumonia  -    Rehab Potential/Prognosis, Swallowing good, to achieve stated therapy goals  -    Swallow Criteria for Skilled Therapeutic Interventions Met demonstrates skilled criteria  -       Recommendations    Therapy Frequency (Swallow) PRN  -    Predicted Duration Therapy Intervention (Days) 1 week  -    SLP Diet Recommendation regular textures;thin liquids  -    Recommended Diagnostics other (see comments)  diet tolerance  -    Recommended Precautions and Strategies upright posture during/after eating;general aspiration precautions  -    Oral Care Recommendations Oral Care BID/PRN;Toothbrush  -    SLP Rec. for Method of Medication Administration meds whole;with puree;as tolerated  -    Monitor for Signs of Aspiration yes;notify SLP if any concerns  -    Anticipated Discharge Disposition (SLP) home with OP services  -              User Key  (r) = Recorded By, (t) = Taken By, (c) = Cosigned By      Initials Name Effective Dates     Emma Sifuentes, MS CCC-SLP 07/11/23 -                     EDUCATION  The patient has been educated in the following areas:   Cognitive Impairment Communication Impairment Dysphagia (Swallowing Impairment) Oral Care/Hydration.        SLP GOALS       Row Name 04/19/24 0830             (LTG) Patient will demonstrate functional swallow for    Diet Texture (Demonstrate functional swallow) regular textures  -      Liquid viscosity (Demonstrate functional swallow) thin liquids  -      [place]  (Demonstrate functional swallow) independently (over 90% accuracy)  -CJ      Time Frame (Demonstrate functional swallow) by discharge  -CJ      Progress/Outcomes (Demonstrate functional swallow) new goal  -CJ         (STG) Patient will tolerate trials of    Consistencies Trialed (Tolerate trials) regular textures;thin liquids  -CJ      Desired Outcome (Tolerate trials) without signs/symptoms of aspiration;without signs of distress  -CJ      [place] (Tolerate trials) with minimal cues (75-90% accuracy)  -CJ      Time Frame (Tolerate trials) 1 week  -CJ      Progress/Outcomes (Tolerate trials) new goal  -CJ                User Key  (r) = Recorded By, (t) = Taken By, (c) = Cosigned By      Initials Name Provider Type    Emma Singleton MS CCC-SLP Speech and Language Pathologist                       Time Calculation:    Time Calculation- SLP       Row Name 24 1031             Time Calculation- SLP    SLP Start Time 0830  -CJ      SLP Received On 24  -         Untimed Charges    38306-VC Eval Speech and Production w/ Language Minutes 25  -CJ      10873-RG Eval Oral Pharyng Swallow Minutes 40  -CJ         Total Minutes    Untimed Charges Total Minutes 65  -CJ       Total Minutes 65  -CJ                User Key  (r) = Recorded By, (t) = Taken By, (c) = Cosigned By      Initials Name Provider Type    Emma Singleton MS CCC-SLP Speech and Language Pathologist                    Therapy Charges for Today       Code Description Service Date Service Provider Modifiers Qty    13662190856 HC ST EVAL ORAL PHARYNG SWALLOW 3 2024 Emma Sifuentes MS CCC-SLP GN 1    [id number] HC ST EVAL SPEECH AND PROD W LANG  2 2024 Emma Sifuentes MS CCC-SLP GN 1                 Emma Sifuentes MS CCC-SLP  2024   and Acute Care - Speech Language Pathology Initial Evaluation  [facility]     Patient Name: Ahmet Mariee  : 1950  MRN: [id number]  Today's Date: 2024                Admit Date: 4/18/2024     Visit Dx:    ICD-10-CM ICD-9-CM   1. Stroke-like symptoms  R29.90 781.99   2. Facial droop  R29.810 781.94   3. Right facial numbness  R20.0 782.0     Patient Active Problem List   Diagnosis    Benign prostatic hyperplasia with lower urinary tract symptoms    Other male erectile dysfunction    Primary hypertension    Multiple renal cysts    OAB (overactive bladder)    Prostate cancer    Hiatal hernia    Gastroesophageal reflux disease without esophagitis    Polycythemia    Irritable bowel syndrome with diarrhea    Liver nodule    Sciatica of left side    Psychogenic fecal incontinence    Chronic prostatitis    Insomnia    Non-alcoholic fatty liver disease    Prediabetes    Impotence of organic origin    Ureteric stone    Benign prostatic hyperplasia with urinary obstruction    Acute prostatitis    Chronic kidney disease    Chronic prostatitis    Multiple renal cysts    Overactive bladder    Primary erectile dysfunction    Benign hypertension    Malignant tumor of prostate    Abdominal pain    Diarrhea    Encounter for health maintenance examination    Fatigue    High prostate specific antigen (PSA)    History of malignant neoplasm of prostate    Hyperglycemia    Induratio penis plastica    Low back pain    Facial droop    Nausea    Pain in joint of left shoulder    Pain in joint of right shoulder    Pain in left knee    Partial thickness rotator cuff tear    Right inguinal hernia    Tear of lateral meniscus of knee    Stroke-like symptoms    PELON (acute kidney injury)     Past Medical History:   Diagnosis Date    Benign prostatic hyperplasia     Cancer     prostate, slow growing, monitored by urology, Naveed Spnecer MD    GERD (gastroesophageal reflux disease)     Hypertension     Injury of left rotator cuff     partial thickness rotator cuff tear    Irritable bowel syndrome      Past Surgical History:   Procedure Laterality Date    CATARACT EXTRACTION W/ INTRAOCULAR LENS  IMPLANT,  BILATERAL Bilateral 2022    CYST REMOVAL      corner of right eye    EYE SURGERY Bilateral     cataract surgery    HERNIA REPAIR  05/2013    hiatal    INGUINAL HERNIA REPAIR  05/2021    bilateral    KNEE CARTILAGE SURGERY  2008    left knee meniscus repair    PARTIAL GASTRECTOMY  05/2013    severe hiatal hernia, emergency surgery     UMBILICAL HERNIA REPAIR  12/2013    UMBILICAL HERNIA REPAIR      VASECTOMY  1990       SLP Recommendation and Plan  SLP Diagnosis: functional speech/language skills, functional cognitive-linguistic skills (04/19/24 0830)  SLP Diagnosis Comments: No observed deficits. Speech is intelligible at conversational level despite facial droop (04/19/24 0830)        Swallow Criteria for Skilled Therapeutic Interventions Met: demonstrates skilled criteria (04/19/24 0830)  SLC Criteria for Skilled Therapy Interventions Met: no problems identified which require skilled intervention (04/19/24 0830)  Anticipated Discharge Disposition (SLP): home with OP services (04/19/24 0830)     Therapy Frequency (Swallow): PRN (04/19/24 0830)  Therapy Frequency (SLP SLC): evaluation only (04/19/24 0830)  Predicted Duration Therapy Intervention (Days): 1 week (04/19/24 0830)  Oral Care Recommendations: Oral Care BID/PRN, Toothbrush (04/19/24 0830)                          Plan of Care Reviewed With: patient (04/19/24 1030)  Progress:  (initial eval) (04/19/24 1030)      SLP EVALUATION (Last 72 Hours)       SLP SLC Evaluation       Row Name 04/19/24 0830       General Information    Prior Level of Function-Communication [facility]  -    Patient's Goals for Discharge patient did not state  -       Comprehension Assessment/Intervention    Comprehension Assessment/Intervention Auditory Comprehension;Reading Comprehension  -       Auditory Comprehension Assessment/Intervention    Auditory Comprehension (Communication) WFL  -       Reading Comprehension Assessment/Intervention    Reading Comprehension (Communication)  WFL  -CJ       Expression Assessment/Intervention    Expression Assessment/Intervention verbal expression;graphic expression  -CJ       Verbal Expression Assessment/Intervention    Verbal Expression WFL  -CJ       Graphic Expression Assessment/Intervention    Graphic Expression WF  -CJ       Oral Motor Structure and Function    Oral Motor Structure and Function WFL  -CJ       Motor Speech Assessment/Intervention    Motor Speech Function [facility]  -    Speech intelligibility 100%;in quiet environment;in connected speech;with unfamiliar listener  -CJ       Cursory Voice Assessment/Intervention    Quality and Resonance (Voice) WFL  -CJ       Cognitive Assessment Intervention- SLP    Cognitive Function (Cognition) WFL  -CJ       SLP Evaluation Clinical Impressions    SLP Diagnosis functional speech/language skills;functional cognitive-linguistic skills  -CJ    SLP Diagnosis Comments No observed deficits. Speech is intelligible at conversational level despite facial droop  -CJ    Rehab Potential/Prognosis good  -CJ    SLC Criteria for Skilled Therapy Interventions Met no problems identified which require skilled intervention  -CJ    Functional Impact no impact on function  -CJ       Recommendations    Therapy Frequency (SLP SLC) evaluation only  -CJ              User Key  (r) = Recorded By, (t) = Taken By, (c) = Cosigned By      Initials Name Effective Dates    Emma Singleton, MS CCC-SLP 07/11/23 -                        EDUCATION  The patient has been educated in the following areas:     Cognitive Impairment Communication Impairment.           SLP GOALS       Row Name 04/19/24 0830             (LTG) Patient will demonstrate functional swallow for    Diet Texture (Demonstrate functional swallow) regular textures  -CJ      Liquid viscosity (Demonstrate functional swallow) thin liquids  -CJ      [place] (Demonstrate functional swallow) independently (over 90% accuracy)  -CJ      Time Frame (Demonstrate functional  swallow) by discharge  -CJ      Progress/Outcomes (Demonstrate functional swallow) new goal  -CJ         (STG) Patient will tolerate trials of    Consistencies Trialed (Tolerate trials) regular textures;thin liquids  -CJ      Desired Outcome (Tolerate trials) without signs/symptoms of aspiration;without signs of distress  -CJ      [place] (Tolerate trials) with minimal cues (75-90% accuracy)  -CJ      Time Frame (Tolerate trials) 1 week  -CJ      Progress/Outcomes (Tolerate trials) new goal  -CJ                User Key  (r) = Recorded By, (t) = Taken By, (c) = Cosigned By      Initials Name Provider Type    Emma Singleton MS CCC-SLP Speech and Language Pathologist                            Time Calculation:      Time Calculation- SLP       Row Name 04/19/24 1031             Time Calculation- SLP    SLP Start Time 0830  -      SLP Received On 04/19/24  -         Untimed Charges    00457-SW Eval Speech and Production w/ Language Minutes 25  -CJ      56108-RO Eval Oral Pharyng Swallow Minutes 40  -CJ         Total Minutes    Untimed Charges Total Minutes 65  -CJ       Total Minutes 65  -CJ                User Key  (r) = Recorded By, (t) = Taken By, (c) = Cosigned By      Initials Name Provider Type    Emma Singleton MS CCC-SLP Speech and Language Pathologist                    Therapy Charges for Today       Code Description Service Date Service Provider Modifiers Qty    56137054063 HC ST EVAL ORAL PHARYNG SWALLOW 3 4/19/2024 Emma Sifuentes MS CCC-SLP GN 1    [id number] HC ST EVAL SPEECH AND PROD W LANG  2 4/19/2024 Emma Sifuentes MS CCC-SLP GN 1                       Emma Sifuentes MS CCC-SLP  4/19/2024

## 2024-04-19 NOTE — ED PROVIDER NOTES
EMERGENCY DEPARTMENT ENCOUNTER    Pt Name: Ahmet Mariee  MRN: [id number]  Pt :   1950  Room Number:  S330/1  Date of encounter:  2024  PCP: Evaristo Gaffney MD  ED Provider: Nicolás Haro MD    Historian: Patient      HPI:  Chief Complaint: Facial numbness, facial weakness        Context: Ahmet Mariee is a  73-year-old man who presents because of acute right-sided facial droop and numbness that started at 5 PM today (currently 8:16 PM).  He denies recent illness or fevers.  He denies other symptoms.       PAST MEDICAL HISTORY  Past Medical History:   Diagnosis Date    Benign prostatic hyperplasia     Cancer     prostate, slow growing, monitored by urology, aNveed Spencer MD    GERD (gastroesophageal reflux disease)     Hypertension     Injury of left rotator cuff     partial thickness rotator cuff tear    Irritable bowel syndrome          PAST SURGICAL HISTORY  Past Surgical History:   Procedure Laterality Date    CATARACT EXTRACTION W/ INTRAOCULAR LENS  IMPLANT, BILATERAL Bilateral     CYST REMOVAL      corner of right eye    EYE SURGERY Bilateral     cataract surgery    HERNIA REPAIR  2013    hiatal    INGUINAL HERNIA REPAIR  2021    bilateral    KNEE CARTILAGE SURGERY  2008    left knee meniscus repair    PARTIAL GASTRECTOMY  2013    severe hiatal hernia, emergency surgery     UMBILICAL HERNIA REPAIR  2013    UMBILICAL HERNIA REPAIR      VASECTOMY           FAMILY HISTORY  Family History   Problem Relation Age of Onset    Cancer Mother         uterine cancer    Hypertension Mother     Cancer Father         prostate and bone    Diabetes Father     Other Sister         knee replacement    Cancer Paternal Grandfather         prostate         SOCIAL HISTORY  Social History     Socioeconomic History    Marital status:    Tobacco Use    Smoking status: Never    Smokeless tobacco: Never   Vaping Use    Vaping status: Never Used   Substance and Sexual Activity     Alcohol use: Never    Drug use: Never    Sexual activity: Yes     Birth control/protection: Vasectomy         ALLERGIES  Sulfa antibiotics        REVIEW OF SYSTEMS  Review of Systems       All systems reviewed and negative except for those discussed in HPI.       PHYSICAL EXAM    I have reviewed the triage vital signs and nursing notes.    ED Triage Vitals [04/18/24 2004]   Temp Heart Rate Resp BP SpO2   97.9 °F (36.6 °C) 72 18 151/96 97 %      Temp src Heart Rate Source Patient Position BP Location FiO2 (%)   Oral Monitor Sitting Right arm --       Physical Exam  GENERAL:   Appears in no acute distress.   HENT: Nares patent.  Obvious right-sided facial droop and appears to have forehead sparing able to lift bilateral eyebrows, numbness when comparing the right face with the left  EYES: No scleral icterus.  CV: Regular rhythm, regular rate.  RESPIRATORY: Normal effort.  No audible wheezes, rales or rhonchi.  ABDOMEN: Soft, nontender  MUSCULOSKELETAL: No deformities.   NEURO: Alert, moves all extremities, follows commands.  Facial asymmetry and numbness described above  SKIN: Warm, dry, no rash visualized.      LAB RESULTS  Recent Results (from the past 24 hour(s))   Single High Sensitivity Troponin T    Collection Time: 04/18/24  8:10 PM    Specimen: Blood   Result Value Ref Range    HS Troponin T 9 <22 ng/L   aPTT    Collection Time: 04/18/24  8:10 PM    Specimen: Blood   Result Value Ref Range    PTT 31.1 22.0 - 39.0 seconds   AST    Collection Time: 04/18/24  8:10 PM    Specimen: Blood   Result Value Ref Range    AST (SGOT) 24 1 - 40 U/L   ALT    Collection Time: 04/18/24  8:10 PM    Specimen: Blood   Result Value Ref Range    ALT (SGPT) 32 1 - 41 U/L   Green Top (Gel)    Collection Time: 04/18/24  8:10 PM   Result Value Ref Range    Extra Tube Hold for add-ons.    Lavender Top    Collection Time: 04/18/24  8:10 PM   Result Value Ref Range    Extra Tube hold for add-on    Gold Top - SST    Collection Time:  04/18/24  8:10 PM   Result Value Ref Range    Extra Tube Hold for add-ons.    Gray Top    Collection Time: 04/18/24  8:10 PM   Result Value Ref Range    Extra Tube Hold for add-ons.    Light Blue Top    Collection Time: 04/18/24  8:10 PM   Result Value Ref Range    Extra Tube Hold for add-ons.    CBC Auto Differential    Collection Time: 04/18/24  8:10 PM    Specimen: Blood   Result Value Ref Range    WBC 7.68 3.40 - 10.80 10*3/mm3    RBC 5.52 4.14 - 5.80 10*6/mm3    Hemoglobin 17.6 13.0 - 17.7 g/dL    Hematocrit 52.4 (H) 37.5 - 51.0 %    MCV 94.9 79.0 - 97.0 fL    MCH 31.9 26.6 - 33.0 pg    MCHC 33.6 31.5 - 35.7 g/dL    RDW 12.3 12.3 - 15.4 %    RDW-SD 42.9 37.0 - 54.0 fl    MPV 10.2 6.0 - 12.0 fL    Platelets 166 140 - 450 10*3/mm3    Neutrophil % 65.8 42.7 - 76.0 %    Lymphocyte % 19.3 (L) 19.6 - 45.3 %    Monocyte % 10.8 5.0 - 12.0 %    Eosinophil % 3.1 0.3 - 6.2 %    Basophil % 0.7 0.0 - 1.5 %    Immature Grans % 0.3 0.0 - 0.5 %    Neutrophils, Absolute 5.06 1.70 - 7.00 10*3/mm3    Lymphocytes, Absolute 1.48 0.70 - 3.10 10*3/mm3    Monocytes, Absolute 0.83 0.10 - 0.90 10*3/mm3    Eosinophils, Absolute 0.24 0.00 - 0.40 10*3/mm3    Basophils, Absolute 0.05 0.00 - 0.20 10*3/mm3    Immature Grans, Absolute 0.02 0.00 - 0.05 10*3/mm3    nRBC 0.0 0.0 - 0.2 /100 WBC   Comprehensive Metabolic Panel    Collection Time: 04/18/24  8:10 PM    Specimen: Blood   Result Value Ref Range    Glucose 105 (H) 65 - 99 mg/dL    BUN 26 (H) 8 - 23 mg/dL    Creatinine 1.29 (H) 0.76 - 1.27 mg/dL    Sodium 137 136 - 145 mmol/L    Potassium 3.8 3.5 - 5.2 mmol/L    Chloride 101 98 - 107 mmol/L    CO2 21.0 (L) 22.0 - 29.0 mmol/L    Calcium 9.9 8.6 - 10.5 mg/dL    Total Protein 7.8 6.0 - 8.5 g/dL    Albumin 4.5 3.5 - 5.2 g/dL    ALT (SGPT) 32 1 - 41 U/L    AST (SGOT) 24 1 - 40 U/L    Alkaline Phosphatase 58 39 - 117 U/L    Total Bilirubin 0.5 0.0 - 1.2 mg/dL    Globulin 3.3 gm/dL    A/G Ratio 1.4 g/dL    BUN/Creatinine Ratio 20.2 7.0 - 25.0     Anion Gap 15.0 5.0 - 15.0 mmol/L    eGFR 58.9 (L) >60.0 mL/min/1.73   Magnesium    Collection Time: 04/18/24  8:10 PM    Specimen: Blood   Result Value Ref Range    Magnesium 2.1 1.6 - 2.4 mg/dL   Phosphorus    Collection Time: 04/18/24  8:10 PM    Specimen: Blood   Result Value Ref Range    Phosphorus 3.7 2.5 - 4.5 mg/dL   POC CHEM 8    Collection Time: 04/18/24  8:16 PM    Specimen: Blood   Result Value Ref Range    Glucose 106 70 - 130 mg/dL    BUN 26 8 - 26 mg/dL    Creatinine 1.50 (H) 0.60 - 1.30 mg/dL    Sodium 139 138 - 146 mmol/L    POC Potassium 3.7 3.5 - 4.9 mmol/L    Chloride 101 98 - 109 mmol/L    Total CO2 26 24 - 29 mmol/L    Hemoglobin 17.7 (H) 12.0 - 17.0 g/dL    Hematocrit 52 (H) 38 - 51 %    Ionized Calcium 1.18 (L) 1.20 - 1.32 mmol/L    eGFR 49.2 (L) >60.0 mL/min/1.73   ECG 12 Lead ED Triage Standing Order; Acute Stroke (Onset <24 hrs)    Collection Time: 04/18/24  8:33 PM   Result Value Ref Range    QT Interval 414 ms    QTC Interval 430 ms   POC Glucose Once    Collection Time: 04/19/24  1:27 AM    Specimen: Blood   Result Value Ref Range    Glucose 127 70 - 130 mg/dL       If labs were ordered, I independently reviewed the results and considered them in treating the patient.        RADIOLOGY  XR Chest 1 View    Result Date: 4/18/2024  XR CHEST 1 VW Date of Exam: 4/18/2024 8:43 PM EDT Indication: Acute Stroke Protocol (onset < 12 hrs) Comparison: Chest x-ray 5/27/2013 Findings: Normal cardiomediastinal silhouette. The lungs are clear. No pleural effusion or pneumothorax. No acute osseous findings.     Impression: No acute cardiopulmonary findings. Electronically Signed: Roni Tan MD  4/18/2024 9:09 PM EDT  Workstation ID: [id number]    CT Angiogram Head w AI Analysis of LVO    Result Date: 4/18/2024  CT ANGIOGRAM HEAD W AI ANALYSIS OF LVO, CT ANGIOGRAM NECK Date of Exam: 4/18/2024 8:18 PM EDT Indication: Neuro deficit, acute stroke suspected Neuro deficit, acute stroke suspected.  Comparison: Same day CT head and CT perfusion Technique: CTA of the head was performed after the uneventful intravenous administration of 115 cc Isovue-370. Reconstructed coronal and sagittal images were also obtained. In addition, a 3-D volume rendered image was created for interpretation. Automated exposure control and iterative reconstruction methods were used. Findings: Contrast opacification: Adequate Aortic Arch: Unremarkable Right: Innominate: Patent Subclavian: Patent Common Carotid: Patent External Carotid:Patent Internal Carotid: Calcified and noncalcified plaques noted proximally causing approximately 40% stenosis by NASCET criteria. Intracranial ICA: Patent MCA:Patent SG: Patent PCA: Patent Vertebral: Patent Left: Subclavian: Patent Common Carotid: Vascular calcifications noted at the origin with possible mild stenosis. Otherwise unremarkable External Carotid:Patent Internal Carotid: Patent Intracranial ICA: Patent MCA:Patent SG: Patent PCA: Patent Vertebral: Patent Basilar: Patent Soft Tissue: Unremarkable Lungs: Unremarkable Bones: Small calcified granuloma within the left lung apex.     Impression: No evidence of vessel occlusion, severe stenosis, aneurysm or dissection of the arterial vessels of the head and neck. Mild, approximately 40%, stenosis of the proximal right cervical internal carotid artery secondary to calcified and noncalcified plaques. Possible mild stenosis at the origin of the left common carotid artery secondary to predominately calcified plaques. Electronically Signed: Jose Spencer DO  4/18/2024 8:47 PM EDT  Workstation ID: [id number]    CT Angiogram Neck    Result Date: 4/18/2024  CT ANGIOGRAM HEAD W AI ANALYSIS OF LVO, CT ANGIOGRAM NECK Date of Exam: 4/18/2024 8:18 PM EDT Indication: Neuro deficit, acute stroke suspected Neuro deficit, acute stroke suspected. Comparison: Same day CT head and CT perfusion Technique: CTA of the head was performed after the uneventful  intravenous administration of 115 cc Isovue-370. Reconstructed coronal and sagittal images were also obtained. In addition, a 3-D volume rendered image was created for interpretation. Automated exposure control and iterative reconstruction methods were used. Findings: Contrast opacification: Adequate Aortic Arch: Unremarkable Right: Innominate: Patent Subclavian: Patent Common Carotid: Patent External Carotid:Patent Internal Carotid: Calcified and noncalcified plaques noted proximally causing approximately 40% stenosis by NASCET criteria. Intracranial ICA: Patent MCA:Patent SG: Patent PCA: Patent Vertebral: Patent Left: Subclavian: Patent Common Carotid: Vascular calcifications noted at the origin with possible mild stenosis. Otherwise unremarkable External Carotid:Patent Internal Carotid: Patent Intracranial ICA: Patent MCA:Patent SG: Patent PCA: Patent Vertebral: Patent Basilar: Patent Soft Tissue: Unremarkable Lungs: Unremarkable Bones: Small calcified granuloma within the left lung apex.     Impression: No evidence of vessel occlusion, severe stenosis, aneurysm or dissection of the arterial vessels of the head and neck. Mild, approximately 40%, stenosis of the proximal right cervical internal carotid artery secondary to calcified and noncalcified plaques. Possible mild stenosis at the origin of the left common carotid artery secondary to predominately calcified plaques. Electronically Signed: Jose Spencer DO  4/18/2024 8:47 PM EDT  Workstation ID: [id number]    CT CEREBRAL PERFUSION WITH & WITHOUT CONTRAST    Result Date: 4/18/2024  CT CEREBRAL PERFUSION W WO CONTRAST Date of Exam: 4/18/2024 8:18 PM EDT Indication: Neuro deficit, acute stroke suspected.  Comparison: Same day head CT Technique: Axial CT images of the brain were obtained prior to and after the administration of 115 cc Isovue-370. Core blood volume, core blood flow, mean transit time, and Tmax images were obtained utilizing the Rapid software  protocol. A limited CT angiogram of the head was also performed to measure the blood vessel density. The radiation dose reduction device was turned on for each scan per the ALARA (As Low as Reasonably Achievable) protocol. Findings: Adequate perfusion images. Adequate ROIs. No significant motion artifact. CBF less than 30%: 0 mL Tmax greater than 6 seconds: 3 mL Mismatch volume: 3 mL Mismatch ratio: Infinite An area of Tmax greater than 6 seconds is noted within the left temporal lobe with an approximate volume of 3 mL.     Impression: No core infarct. No definite ischemia. A small area of Tmax greater than 6 seconds is noted within the left temporal lobe with an approximate volume of 3 mL, which is presumed to be artifactual. If further evaluation of this region is warranted, please consider follow-up with dedicated MRI Electronically Signed: Jose Spencer DO  4/18/2024 8:37 PM EDT  Workstation ID: [id number]    CT Head Without Contrast Stroke Protocol    Result Date: 4/18/2024  CT HEAD WO CONTRAST STROKE PROTOCOL Date of Exam: 4/18/2024 8:08 PM EDT Indication: Neuro deficit, acute, stroke suspected Neuro deficit, acute stroke suspected. Comparison: None available. Technique: Axial CT images were obtained of the head without contrast administration.  Reconstructed coronal images were also obtained. Automated exposure control and iterative construction methods were used. Scan Time: 2015 Results discussed with Dr. Haro at 8:19 a.m. on 4/18/2024. Findings: No intracranial hemorrhage. Gray-white matter differentiation is maintained without evidence of an acute infarction. Multiple foci of decreased attenuation are present within the subcortical, deep cerebral, and periventricular white matter consistent with chronic small vessel/microangiopathic ischemic changes. No extra-axial mass or collection. The ventricles and sulci are prominent commensurate with involutional changes. The posterior fossa appears grossly  normal. Sellar and suprasellar structures are normal. Lens replacements. Mucosal thickening in the inferior maxillary sinuses.. The bony calvarium is intact.     Impression: No acute intracranial pathology. Electronically Signed: Alvarez Wilkerson MD  4/18/2024 8:22 PM EDT  Workstation ID: [id number]     I ordered and independently reviewed the above noted radiographic studies.      I viewed images of Noncon head CT personally reviewed while in the scanner with the patient as a stroke alert do not appreciate any acute bleeds masses or other abnormalities that I can appreciate.    CTAs and CT perfusion's reviewed with neurostroke team shows multiple areas of stenosis and atherosclerosis but no acute flow-limiting lesions or perfusion deficits that would be amenable to thrombectomy.  See radiologist's dictation for official interpretation.        PROCEDURES    Procedures    ECG 12 Lead ED Triage Standing Order; Acute Stroke (Onset <24 hrs)   Preliminary Result   Test Reason : ED Triage Standing Order~   Blood Pressure :   */*   mmHG   Vent. Rate :  65 BPM     Atrial Rate :  65 BPM      P-R Int : 158 ms          QRS Dur :  92 ms       QT Int : 414 ms       P-R-T Axes :  19   6   6 degrees      QTc Int : 430 ms      Normal sinus rhythm   Normal ECG   When compared with ECG of 09-FEB-2023 21:59,   No significant change was found      Referred By: EDMD           Confirmed By:           MEDICATIONS GIVEN IN ER    Medications   sodium chloride 0.9 % flush 10 mL (has no administration in time range)   atorvastatin (LIPITOR) tablet 80 mg (has no administration in time range)   aspirin chewable tablet 81 mg (81 mg Oral Given 4/18/24 2134)     Or   aspirin suppository 300 mg ( Rectal Not Given:  See Alt 4/18/24 2134)   clopidogrel (PLAVIX) tablet 300 mg (300 mg Oral Given 4/18/24 2133)     And   clopidogrel (PLAVIX) tablet 75 mg (has no administration in time range)   pantoprazole (PROTONIX) EC tablet 40 mg (has no administration in  time range)   hyoscyamine (LEVSIN) SL tablet 125 mcg (has no administration in time range)   sildenafil (REVATIO) tablet 5 mg (has no administration in time range)   sodium chloride 0.9 % flush 10 mL (has no administration in time range)   sodium chloride 0.9 % flush 10 mL (has no administration in time range)   sodium chloride 0.9 % infusion 40 mL (has no administration in time range)   acetaminophen (TYLENOL) tablet 650 mg (has no administration in time range)     Or   acetaminophen (TYLENOL) 160 MG/5ML oral solution 650 mg (has no administration in time range)     Or   acetaminophen (TYLENOL) suppository 650 mg (has no administration in time range)   melatonin tablet 5 mg (has no administration in time range)   sennosides-docusate (PERICOLACE) 8.6-50 MG per tablet 2 tablet (has no administration in time range)     And   polyethylene glycol (MIRALAX) packet 17 g (has no administration in time range)     And   bisacodyl (DULCOLAX) EC tablet 5 mg (has no administration in time range)     And   bisacodyl (DULCOLAX) suppository 10 mg (has no administration in time range)   ondansetron ODT (ZOFRAN-ODT) disintegrating tablet 4 mg (has no administration in time range)     Or   ondansetron (ZOFRAN) injection 4 mg (has no administration in time range)   iopamidol (ISOVUE-370) 76 % injection 115 mL (115 mL Intravenous Given 4/18/24 2029)   sodium chloride 0.9 % bolus 500 mL (0 mL Intravenous Stopped 4/18/24 2333)         MEDICAL DECISION MAKING, PROGRESS, and CONSULTS    All labs, if obtained, have been independently reviewed by me.  All radiology studies, if obtained, have been reviewed by me and the radiologist dictating the report.  All EKG's, if obtained, have been independently viewed and interpreted by me/my attending physician.      Discussion below represents my analysis of pertinent findings related to patient's condition, differential diagnosis, treatment plan and final disposition.                          Differential diagnosis:    Hemorrhagic stroke, ischemic stroke, Bell's palsy, intracranial mass, seizure, sepsis, anemia, electrolyte abnormality, infection      Additional sources:    - Discussed/ obtained information from independent historians:      - External (non-ED) record review: Outpatient PCP notes show history of nonalcoholic fatty liver, prediabetes, prostate cancer, CKD    - Chronic or social conditions impacting care: Prostate cancer, prediabetes, chronic kidney disease    - Shared decision making: Agreeable to hospital admission      Orders placed during this visit:  Orders Placed This Encounter   Procedures    CT Head Without Contrast Stroke Protocol    CT Angiogram Head w AI Analysis of LVO    CT Angiogram Neck    CT CEREBRAL PERFUSION WITH & WITHOUT CONTRAST    XR Chest 1 View    MRI Brain With & Without Contrast    Clare Draw    Single High Sensitivity Troponin T    aPTT    AST    ALT    CBC Auto Differential    Comprehensive Metabolic Panel    Hemoglobin A1c    Lipid Panel    Magnesium    Phosphorus    Basic Metabolic Panel    CBC Auto Differential    Magnesium    NPO Diet NPO Type: Strict NPO    Initiate Code Stroke    Perform NIH Stroke Scale    Measure Actual Weight    Head of Bed 30 Degrees or Less    Undress and Gown    Vital Signs    Neuro Checks    Notify Provider SBP <80 or >200    Notify Provider for SBP > 140 if Hemorrhagic Stroke    No Hypotonic Fluids    Nursing Dysphagia Screening (Complete Prior to Giving anything PO)    RN to Place Order SLP Consult (IF swallow screen failed) - Eval & Treat Choosing Reason of RN Dysphagia Screen Failed    Vital Signs    Continuous Pulse Oximetry    Telemetry - Place Orders & Notify Provider of Results When Patient Experiences Acute Chest Pain, Dysrhythmia or Respiratory Distress    Notify Provider    Nursing Dysphagia Screening (Complete Prior to Giving Anything By Mouth)    RN to Place Order SLP Consult - Eval & Treat Choosing Reason of RN  Dysphagia Screen Failed    Nurse to Call MD or Nutrition Services for Diet if Patient Passes Dysphagia Screen    Intake and Output    Neuro Checks    NIHSS Assessment    Order CT Head Without Contrast for Neurological Decline    Provide Stroke Education Material    Place Sequential Compression Device    Maintain Sequential Compression Device    Neurological Measures    Activity As Tolerated    Vital Signs    Intake & Output    Weigh Patient    Oral Care    Place Sequential Compression Device    Maintain Sequential Compression Device    Maintain IV Access    Telemetry - Place Orders & Notify Provider of Results When Patient Experiences Acute Chest Pain, Dysrhythmia or Respiratory Distress    May Be Off Telemetry for Tests    Up With Assistance    Strict Intake & Output    Daily Weights    Follow Standard Precautions    Bladder Scan    Assess Patient For Urinary Retention    Utilize Voiding Measures if Retention is Suspected    Bladder Scan for Suspected Urinary Retention    Monitor Every 1-2 Hours for Spontaneous Void if Bladder Scan Volume is Less Than 500mL & Patient is Without Symptoms of Bladder Discomfort / Distention    Straight Cath For Acute Retention if Bladder Scan Volume is Greater Than 500mL or Patient Has Symptoms of Bladder Discomfort / Distention (Unless Contraindicated)    Notify Provider Acute Urinary Retention    Code Status and Medical Interventions:    Inpatient Neurology Consult Stroke    Inpatient Case Management  Consult    Inpatient Diabetes Educator Consult    Inpatient Neurology Consult Stroke    OT Consult: Eval & Treat    PT Consult: Eval & Treat As Tolerated    Oxygen Therapy- Nasal Cannula; Titrate 1-6 LPM Per SpO2; 90 - 95%    SLP Consult: Eval & Treat Communication Disorder    SLP Consult: Eval & Treat RN Dysphagia Screen Failed    POC CHEM 8    POCT Protime/INR    POC Glucose Q6H    POC Glucose Once    ECG 12 Lead ED Triage Standing Order; Acute Stroke (Onset <24 hrs)     Adult Transthoracic Echo Complete W/ Cont if Necessary Per Protocol (With Agitated Saline)    Insert Large-Bore Peripheral IV - Right AC Preferred    Insert Peripheral IV    Insert Peripheral IV    Initiate Observation Status    CBC & Differential    Green Top (Gel)    Lavender Top    Gold Top - SST    Gray Top    Light Blue Top         Additional orders considered but not ordered:      ED Course:    Consultants: Stroke neurology, [facility] medicine    ED Course as of 04/19/24 0247   u Apr 18, 2024 2016 This is a 73-year-old man who presents because of acute right-sided facial droop and numbness that started at 5 PM today (currently 8:16 PM).  He denies recent illness or fevers.  He denies other symptoms. [CC]   2016 Patient arrived he has right-sided facial droop with forehead sparing he was made a stroke alert taken directly to CT scanner neurostroke team is on hand. [CC]   2017 Called head CT personally reviewed on the scan with the patient no acute bleeds masses or other abnormalities that I can appreciate. [CC]   2021 Still on the CT scan with the patient neurostroke team has gotten more story on him it sounds like he was actually having symptoms since yesterday and they are recommending against TNK based on last known normal.  I am in agreement with this.  Obtaining vessel imaging and MRI we will continue to monitor closely. [CC]   Fri Apr 19, 2024   0246 Vessel imaging reviewed with neurostroke team no acute flow-limiting lesions or perfusion deficits but he does have multiple areas of stenosis and atherosclerosis they are recommending hospital admission for MRI and full stroke workup.  Laboratory review shows no actionable items on CBC and CMP mildly elevated creatinine which is stable when compared with prior values..  Medicine team consulted for admission. [CC]      ED Course User Index  [CC] Nicolás Haro MD       35 minutes of critical care provided. This time excludes other billable  procedures. Time does include preparation of documents, medical consultations, review of old records, and direct bedside care. Patient is at high risk for life-threatening deterioration due to acute facial droop and facial numbness with forehead sparing initially believed to be within the tenecteplase window and managed as a stroke alert..         Shared Decision Making:  After my consideration of clinical presentation and any laboratory/radiology studies obtained, I discussed the findings with the patient/patient representative who is in agreement with the treatment plan and the final disposition.   Risks and benefits of discharge and/or observation/admission were discussed.       AS OF 02:47 EDT VITALS:    BP - 162/82  HR - 60  TEMP - 97.6 °F (36.4 °C) (Oral)  O2 SATS - 99%                  DIAGNOSIS  Final diagnoses:   Stroke-like symptoms   Facial droop   Right facial numbness         DISPOSITION  Admit      Please note that portions of this document were completed with voice recognition software.        Nicolás Haro MD  04/19/24 0250

## 2024-04-19 NOTE — TELEPHONE ENCOUNTER
Provider: DR. SCHMIDT    Caller: ARELY ESPARZA    Relationship to Patient: SELF    Phone Number: [phone]     Reason for Call: PATIENT WANTED TO LET PROVIDER KNOW HE WAS ADMITTED TO THE HOSPITAL.THEY ARE EXAMINING HIM FOR A POSSIBLE STROKE.

## 2024-04-19 NOTE — ED NOTES
" Ahmet Mariee    Nursing Report ED to Floor:  Mental status: aox4  Ambulatory status: assist x1  Oxygen Therapy:  ra  Cardiac Rhythm: nsr  Admitted from: home  Safety Concerns:  fall risk  Social Issues: none  ED Room #:  17    ED Nurse Phone Extension - [phone] or may call [phone].      HPI:   Chief Complaint   Patient presents with    Stroke       Past Medical History:  Past Medical History:   Diagnosis Date    Benign prostatic hyperplasia     Cancer     prostate, slow growing, monitored by urology, Naveed Spencer MD    Exocrine pancreatic insufficiency     Monitored by GI Dr. Daniel Sanchez    GERD (gastroesophageal reflux disease)     Hypertension     Injury of left rotator cuff     partial thickness rotator cuff tear    Irritable bowel syndrome         Past Surgical History:  Past Surgical History:   Procedure Laterality Date    CYST REMOVAL      corner of right eye    EYE SURGERY Bilateral     HERNIA REPAIR  05/2013    hiatal    INGUINAL HERNIA REPAIR  05/2021    bilateral    KNEE CARTILAGE SURGERY  2008    left knee meniscus repair    PARTIAL GASTRECTOMY  05/2013    severe hiatal hernia, emergency surgery     UMBILICAL HERNIA REPAIR  12/2013    VASECTOMY  1990        Admitting Doctor:   Bhavik Rajput MD    Consulting Provider(s):  Consults       Date and Time Order Name Status Description    4/18/2024  8:07 PM Inpatient Neurology Consult Stroke               Admitting Diagnosis:   There were no encounter diagnoses.    Most Recent Vitals:   Vitals:    04/18/24 2004 04/18/24 2100 04/18/24 2101 04/18/24 2131   BP: 151/96 143/95  143/83   BP Location: Right arm      Patient Position: Sitting      Pulse: 72 65  66   Resp: 18      Temp: 97.9 °F (36.6 °C)      TempSrc: Oral      SpO2: 97%  97% 92%   Weight: 82.6 kg (182 lb)      Height: 177.8 cm (70\")          Active LDAs/IV Access:   Lines, Drains & Airways       Active LDAs       Name Placement date Placement time Site Days    Peripheral IV 02/09/23 2156 Left " Antecubital 02/09/23  2156  Antecubital  434    Peripheral IV 04/18/24 2011 Right Antecubital 04/18/24 2011  Antecubital  less than 1    Urethral Catheter Silicone 15 Fr. 05/28/21  2332  -- 1055                    Labs (abnormal labs have a star):   Labs Reviewed   CBC WITH AUTO DIFFERENTIAL - Abnormal; Notable for the following components:       Result Value    Hematocrit 52.4 (*)     Lymphocyte % 19.3 (*)     All other components within normal limits   COMPREHENSIVE METABOLIC PANEL - Abnormal; Notable for the following components:    Glucose 105 (*)     BUN 26 (*)     Creatinine 1.29 (*)     CO2 21.0 (*)     eGFR 58.9 (*)     All other components within normal limits    Narrative:     GFR Normal >60  Chronic Kidney Disease <60  Kidney Failure <15    The GFR formula is only valid for adults with stable renal function between ages 18 and 70.   POCT CHEM 8 - Abnormal; Notable for the following components:    Creatinine 1.50 (*)     Hemoglobin 17.7 (*)     Hematocrit 52 (*)     Ionized Calcium 1.18 (*)     eGFR 49.2 (*)     All other components within normal limits   SINGLE HS TROPONIN T - Normal    Narrative:     High Sensitive Troponin T Reference Range:  <14.0 ng/L- Negative Female for AMI  <22.0 ng/L- Negative Male for AMI  >=14 - Abnormal Female indicating possible myocardial injury.  >=22 - Abnormal Male indicating possible myocardial injury.   Clinicians would have to utilize clinical acumen, EKG, Troponin, and serial changes to determine if it is an Acute Myocardial Infarction or myocardial injury due to an underlying chronic condition.        APTT - Normal    Narrative:     PTT = The equivalent PTT values for the therapeutic range of heparin levels at 0.3 to 0.5 U/ml are 60 to 70 seconds.   AST - Normal   ALT - Normal   MAGNESIUM - Normal   PHOSPHORUS - Normal   RAINBOW DRAW    Narrative:     The following orders were created for panel order [place] Draw.  Procedure                               Abnormality          Status                     ---------                               -----------         ------                     Green Top (Gel)[[id number]]                                  Final result               Lavender Top[[id number]]                                     Final result               Gold Top - SST[[id number]]                                   Final result               Gray Top[[id number]]                                         In process                 Light Blue Top[[id number]]                                   Final result                 Please view results for these tests on the individual orders.   POCT PROTIME - INR   CBC AND DIFFERENTIAL    Narrative:     The following orders were created for panel order CBC & Differential.  Procedure                               Abnormality         Status                     ---------                               -----------         ------                     CBC Auto Differential[[id number]]        Abnormal            Final result                 Please view results for these tests on the individual orders.   GREEN TOP   LAVENDER TOP   GOLD TOP - SST   LIGHT BLUE TOP   GRAY TOP       Meds Given in ED:   Medications   sodium chloride 0.9 % flush 10 mL (has no administration in time range)   aspirin chewable tablet 81 mg (81 mg Oral Given 4/18/24 2134)     Or   aspirin suppository 300 mg ( Rectal Not Given:  See Alt 4/18/24 2134)   clopidogrel (PLAVIX) tablet 300 mg (300 mg Oral Given 4/18/24 2133)     And   clopidogrel (PLAVIX) tablet 75 mg (has no administration in time range)   iopamidol (ISOVUE-370) 76 % injection 115 mL (115 mL Intravenous Given 4/18/24 2029)   sodium chloride 0.9 % bolus 500 mL (500 mL Intravenous New Bag 4/18/24 2133)           Last NIH score:  Interval: baseline  1a. Level of Consciousness: 0-->Alert, keenly responsive  1b. LOC Questions: 0-->Answers both questions correctly  1c. LOC Commands: 0-->Performs both tasks correctly  2. Best  Gaze: 0-->Normal  3. Visual: 0-->No visual loss  4. Facial Palsy: 1-->Minor paralysis (flattened nasolabial fold, asymmetry on smiling)  5a. Motor Arm, Left: 0-->No drift, limb holds 90 (or 45) degrees for full 10 secs  5b. Motor Arm, Right: 0-->No drift, limb holds 90 (or 45) degrees for full 10 secs  6a. Motor Leg, Left: 0-->No drift, leg holds 30 degree position for full 5 secs  6b. Motor Leg, Right: 0-->No drift, leg holds 30 degree position for full 5 secs  7. Limb Ataxia: 0-->Absent  8. Sensory: 1-->Mild-to-moderate sensory loss, patient feels pinprick is less sharp or is dull on the affected side, or there is a loss of superficial pain with pinprick, but patient is aware of being touched  9. Best Language: 0-->No aphasia, normal  10. Dysarthria: 0-->Normal  11. Extinction and Inattention (formerly Neglect): 0-->No abnormality    Total (NIH Stroke Scale): 2     Dysphagia screening results:  Patient Factors Component (Dysphagia:Stroke or Rule-out)  Best Eye Response: 4-->(E4) spontaneous (04/18/24 2040)  Best Motor Response: 6-->(M6) obeys commands (04/18/24 2040)  Best Verbal Response: 5-->(V5) oriented (04/18/24 2040)  Jeffery Coma Scale Score: 15 (04/18/24 2040)  Is there Facial Asymmetry/Weakness?: No (04/18/24 2036)  Is there Tongue Asymmetry/Weakness?: No (04/18/24 2036)  Is there Palatal Asymmetry/Weakness?: No (04/18/24 2036)  Patient Assessment Result: Pass - Proceed to Water Test (04/18/24 2036)     Jeffery Coma Scale:  No data recorded     CIWA:        Restraint Type:            Isolation Status:  No active isolations

## 2024-04-19 NOTE — CONSULTS
"Stroke Consult Note    Patient Name: Ahmet Mariee   MRN: [id number]  Age: 73 y.o.  Sex: male  : 1950    Primary Care Physician: Evaristo Gaffney MD  Referring Physician:  Tahir CHEN    TIME STROKE TEAM CALLED:  EST     TIME PATIENT SEEN: 2007 EST    Handedness: Right hand  Race:     Chief Complaint/Reason for Consultation: Right ischial droop and numbness    HPI:   This is Ahmet Mariee  a 73-year-old white male, right-handed with significant health diagnosis for prostate cancer, pending pancreatic insufficiency, IBS, hypertension, GERD who presented to [facility] ED via private vehicle accompanied by his wife for evaluation of abrupt onset of right facial droop and numbness.  Upon arrival stroke alert was initiated patient was taken emergently to stat CT head which was negative for acute abnormality or hemorrhage.  Patient blood pressure upon arrival 150/96 normal sinus rhythm heart rate in the 60s.  Patient tells me that he was in the ballgame around 6:60-7 pm when his granddaughter noted and alerted him that he has a right facial droop.  Earlier patient reported around 5:30 PM while he was brushing his teeth he noted that he had a difficult time spitting out the water\" swish and spit\", also patient tells me that yesterday evening he noted that\" his right eye was not as sharp as his left eye\" which he did not think much of it.  Otherwise patient denies any vision changes, any focal weakness, no dysarthria or aphasia, no nystagmus, no dysmetria or ataxia, patient denies any other numbness or tingling anywhere except his right face.  Patient lives with his wife, independent with all his ADL.  Patient denies any tobacco, alcohol, illicit drug or marijuana use.  Patient tells me that he is compliant with all his meds.    Last Known Normal Date/Time: 2024 1700 EST   However pt reported blurry vision \" Right eye not as sharp comparing to left eye \" started yesterday evening.    Review of " Systems   Constitutional:  Negative for activity change.   HENT:  Positive for drooling. Negative for trouble swallowing.    Eyes:  Positive for visual disturbance.   Respiratory: Negative.     Cardiovascular: Negative.    Gastrointestinal: Negative.    Endocrine: Negative.    Genitourinary: Negative.    Musculoskeletal: Negative.    Skin: Negative.    Allergic/Immunologic: Negative.    Neurological:  Positive for facial asymmetry and numbness.   Hematological: Negative.    Psychiatric/Behavioral: Negative.        Past Medical History:   Diagnosis Date    Benign prostatic hyperplasia     Cancer     prostate, slow growing, monitored by urology, Naveed Spencer MD    Exocrine pancreatic insufficiency     Monitored by GI Dr. Daniel Sanchez    GERD (gastroesophageal reflux disease)     Hypertension     Injury of left rotator cuff     partial thickness rotator cuff tear    Irritable bowel syndrome      Past Surgical History:   Procedure Laterality Date    CYST REMOVAL      corner of right eye    EYE SURGERY Bilateral     HERNIA REPAIR  05/2013    hiatal    INGUINAL HERNIA REPAIR  05/2021    bilateral    KNEE CARTILAGE SURGERY  2008    left knee meniscus repair    PARTIAL GASTRECTOMY  05/2013    severe hiatal hernia, emergency surgery     UMBILICAL HERNIA REPAIR  12/2013    VASECTOMY  1990     Family History   Problem Relation Age of Onset    Cancer Mother         uterine cancer    Hypertension Mother     Cancer Father         prostate and bone    Diabetes Father     Other Sister         knee replacement    Cancer Paternal Grandfather         prostate     Social History     Socioeconomic History    Marital status:    Tobacco Use    Smoking status: Never    Smokeless tobacco: Never   Vaping Use    Vaping status: Never Used   Substance and Sexual Activity    Alcohol use: Never    Drug use: Never    Sexual activity: Yes     Birth control/protection: Vasectomy     Allergies   Allergen Reactions    Sulfa Antibiotics  Rash     Prior to Admission medications    Medication Sig Start Date End Date Taking? Authorizing Provider   esomeprazole (nexIUM) 20 MG capsule Take 1 capsule by mouth 2 (Two) Times a Day. 3/19/24   Evaristo Gaffney MD   hydroCHLOROthiazide (HYDRODIURIL) 12.5 MG tablet TAKE 1 TABLET DAILY 9/19/23   Evaristo Gaffney MD   hyoscyamine (ANASPAZ,LEVSIN) 0.125 MG tablet Take 1 tablet by mouth Every 6 (Six) Hours As Needed for Cramping. 1/2/24   Evaristo Gaffney MD   lisinopril (PRINIVIL,ZESTRIL) 30 MG tablet TAKE 1 TABLET DAILY 4/7/23   Evaristo Gaffney MD   loperamide (IMODIUM A-D) 1 MG/7.5ML oral solution Take  by mouth.    Vineet Atkinson MD   Omega-3 Fatty Acids (fish oil) 1000 MG capsule capsule Patient takes two a day of this.    Vineet Atkinson MD   saccharomyces boulardii (FLORASTOR) 250 MG capsule Take 1 capsule by mouth 2 (Two) Times a Day. [facility] otc daily takes one a day    Vineet Atkinson MD   tadalafil (CIALIS) 20 MG tablet Take 1 tablet by mouth Daily. 9/8/22   Vineet Atkinson MD   tadalafil (CIALIS) 5 MG tablet Take 1 tablet by mouth Daily. 10/6/21   Evaristo Gaffney MD   traZODone (DESYREL) 50 MG tablet TAKE 1 TO 2 TABLETS NIGHTLY as needed for sleep 3/19/24   Evaristo Gaffney MD   triamcinolone (KENALOG) 0.1 % ointment  12/1/23   Vineet Atkinson MD   uribel (URO-MP) 118 MG capsule capsule Take 1 capsule by mouth 4 (Four) Times a Day. 6/8/23   Evaristo Gaffney MD         Temp:  [97.9 °F (36.6 °C)] 97.9 °F (36.6 °C)  Heart Rate:  [72] 72  Resp:  [18] 18  BP: (151)/(96) 151/96  Neurological Exam  Mental Status  Alert. Oriented to person, place and time. Oriented to person, place, and time. Recent and remote memory are intact. Speech is normal. Language is fluent with no aphasia. Attention and concentration are normal. Fund of knowledge is appropriate for level of education. Apraxia absent.    Cranial Nerves  CN II: Right visual acuity: Normal. Left visual acuity: Normal. Right  normal visual field. Left normal visual field.  CN III, IV, VI: Extraocular movements intact bilaterally. Normal lids and orbits bilaterally. Pupils equal round and reactive to light bilaterally.  CN V:  Right: Abnormal facial sensation: Jaw strength is normal. Normal corneal reflex.  Left: Jaw strength is normal. Normal corneal reflex.  CN VII:  Right: There is central facial weakness.  Left: There is no facial weakness.  CN VIII: Intact hearing bilateral.  CN IX, X: Palate elevates symmetrically  CN XI: Shoulder shrug strength is normal.  CN XII: Tongue midline without atrophy or fasciculations.    Motor  Normal muscle bulk throughout. No fasciculations present. Normal muscle tone. No abnormal involuntary movements. Strength is 5/5 throughout all four extremities.    Sensory  Light touch abnormality:     Reflexes  Right Plantar: downgoing  Left Plantar: downgoing    Left pathological reflexes: Ankle clonus absent.    Coordination  Right: Finger-to-nose normal. Rapid alternating movement normal. Heel-to-shin normal.Left: Finger-to-nose normal. Rapid alternating movement normal. Heel-to-shin normal.    Gait  Casual gait is normal including stance, stride, and arm swing.      Physical Exam  Constitutional:       Appearance: Normal appearance.   HENT:      Head: Normocephalic and atraumatic.      Mouth/Throat:      Mouth: Mucous membranes are moist.   Eyes:      General: Lids are normal.      Extraocular Movements: Extraocular movements intact.      Pupils: Pupils are equal, round, and reactive to light.   Cardiovascular:      Rate and Rhythm: Normal rate and regular rhythm.      Pulses: Normal pulses.   Pulmonary:      Effort: Pulmonary effort is normal.   Abdominal:      Tenderness: There is no abdominal tenderness.   Musculoskeletal:         General: Normal range of motion.      Cervical back: Normal range of motion and neck supple.   Skin:     General: Skin is warm and dry.      Capillary Refill: Capillary refill  takes less than 2 seconds.   Neurological:      Mental Status: He is alert and oriented to person, place, and time.      Cranial Nerves: Cranial nerve deficit present.      Sensory: Sensory deficit present.      Motor: Motor strength is normal.  Psychiatric:         Mood and Affect: Mood normal.         Speech: Speech normal.         Behavior: Behavior normal.         Thought Content: Thought content normal.         Judgment: Judgment normal.         Acute Stroke Data    Thrombolytic Inclusion / Exclusion Criteria    Time: 20:37 EDT  Person Administering Scale: VAL Kay    Inclusion Criteria  [x]   18 years of age or greater   []   Onset of symptoms < 4.5 hours before beginning treatment (stroke onset = time patient was last seen well or without symptoms).   []   Diagnosis of acute ischemic stroke causing measurable disabling deficit (Complete Hemianopia, Any Aphasia, Visual or Sensory Extinction, Any weakness limiting sustained effort against gravity)   []   Any remaining deficit considered potentially disabling in view of patient and practitioner   Exclusion criteria (Do not proceed with Alteplase if any are checked under exclusion criteria)  [x]   Onset unknown or GREATER than 4.5 hours   []   ICH on CT/MRI   []   CT demonstrates hypodensity representing acute or subacute infarct   []   Significant head trauma or prior stroke in the previous 3 months   []   Symptoms suggestive of subarachnoid hemorrhage   []   History of un-ruptured intracranial aneurysm GREATER than 10 mm   []   Recent intracranial or intraspinal surgery within the last 3 months   []   Arterial puncture at a non-compressible site in the previous 7 days   []   Active internal bleeding   []   Acute bleeding tendency   []   Platelet count LESS than 100,000 for known hematological diseases such as leukemia, thrombocytopenia or chronic cirrhosis   []   Current use of anticoagulant with INR GREATER than 1.7 or PT GREATER than 15  seconds, aPTT GREATER than 40 seconds   []   Heparin received within 48 hours, resulting in abnormally elevated aPTT GREATER than upper limit of normal   []   Current use of direct thrombin inhibitors or direct factor Xa inhibitors in the past 48 hours   []   Elevated blood pressure refractory to treatment (systolic GREATER than 185 mm/Hg or diastolic  GREATER than 110 mm/Hg   []   Suspected infective endocarditis and aortic arch dissection   []   Current use of therapeutic treatment dose of low-molecular-weight heparin (LMWH) within the previous 24 hours   []   Structural GI malignancy or bleed   Relative exclusion for all patients  [x]   Only minor non-disabling symptoms   []   Pregnancy   []   Seizure at onset with postictal residual neurological impairments   []   Major surgery or previous trauma within past 14 days   []   History of previous spontaneous ICH, intracranial neoplasm, or AV malformation   []   Postpartum (within previous 14 days)   []   Recent GI or urinary tract hemorrhage (within previous 21 days)   []   Recent acute MI (within previous 3 months)   []   History of un-ruptured intracranial aneurysm LESS than 10 mm   []   History of ruptured intracranial aneurysm   []   Blood glucose LESS than 50 mg/dL (2.7 mmol/L)   []   Dural puncture within the last 7 days   []   Known GREATER than 10 cerebral microbleeds   Additional exclusions for patients with symptoms onset between 3 and 4.5 hours.  []   Age > 80.   []   On any anticoagulants regardless of INR  >>> Warfarin (Coumadin), Heparin, Enoxaparin (Lovenox), fondaparinux (Arixtra), bivalirudin (Angiomax), Argatroban, dabigatran (Pradaxa), rivaroxaban (Xarelto), or apixaban (Eliquis)   []   Severe stroke (NIHSS > 25).   []   History of BOTH diabetes and previous ischemic stroke.   []   The risks and benefits have been discussed with the patient or family related to the administration of IV thrombolytic therapy for stroke symptoms.   []   I have  discussed and reviewed the patient's case and imaging with the attending prior to IV thrombolytic therapy.   na Time IV thrombolytic administered       Hospital Meds:  Scheduled- aspirin, 81 mg, Oral, Daily   Or  aspirin, 300 mg, Rectal, Daily  clopidogrel, 300 mg, Oral, Once   And  [START ON 4/19/2024] clopidogrel, 75 mg, Oral, Daily  sodium chloride, 500 mL, Intravenous, Once      Infusions-     PRNs-   sodium chloride    Functional Status Prior to Current Stroke/[place] Score: 0    NIH Stroke Scale  Time: 20:37 EDT  Person Administering Scale: VAL Kay    Interval: baseline  1a. Level of Consciousness: 0-->Alert, keenly responsive  1b. LOC Questions: 0-->Answers both questions correctly  1c. LOC Commands: 0-->Performs both tasks correctly  2. Best Gaze: 0-->Normal  3. Visual: 0-->No visual loss  4. Facial Palsy: 1-->Minor paralysis (flattened nasolabial fold, asymmetry on smiling)  5a. Motor Arm, Left: 0-->No drift, limb holds 90 (or 45) degrees for full 10 secs  5b. Motor Arm, Right: 0-->No drift, limb holds 90 (or 45) degrees for full 10 secs  6a. Motor Leg, Left: 0-->No drift, leg holds 30 degree position for full 5 secs  6b. Motor Leg, Right: 0-->No drift, leg holds 30 degree position for full 5 secs  7. Limb Ataxia: 0-->Absent  8. Sensory: 1-->Mild-to-moderate sensory loss, patient feels pinprick is less sharp or is dull on the affected side, or there is a loss of superficial pain with pinprick, but patient is aware of being touched  9. Best Language: 0-->No aphasia, normal  10. Dysarthria: 0-->Normal  11. Extinction and Inattention (formerly Neglect): 0-->No abnormality    Total (NIH Stroke Scale): 2       Results Reviewed:  I have personally reviewed current lab, radiology, and data and agree with results.  Labs reviewed and noted for  Sodium 139  Potassium 3.7  Creatinine 1.50  BUN 26  GFR 49  Ionized calcium 1.18  Magnesium 2.1  Phos 3.7  ALP 58  AST 24  ALT 32  Recent A1c 6.30  Recent  HDL 33  LDL 97  Triglycerides 360  H&H 17.7\52  WBC 7.68    CT Angiogram Head w AI Analysis of LVO    Result Date: 4/18/2024  Impression: No evidence of vessel occlusion, severe stenosis, aneurysm or dissection of the arterial vessels of the head and neck. Mild, approximately 40%, stenosis of the proximal right cervical internal carotid artery secondary to calcified and noncalcified plaques. Possible mild stenosis at the origin of the left common carotid artery secondary to predominately calcified plaques. Electronically Signed: Jose Spencer DO  4/18/2024 8:47 PM EDT  Workstation ID: [id number]    CT Angiogram Neck    Result Date: 4/18/2024  Impression: No evidence of vessel occlusion, severe stenosis, aneurysm or dissection of the arterial vessels of the head and neck. Mild, approximately 40%, stenosis of the proximal right cervical internal carotid artery secondary to calcified and noncalcified plaques. Possible mild stenosis at the origin of the left common carotid artery secondary to predominately calcified plaques. Electronically Signed: Jose Spencer DO  4/18/2024 8:47 PM EDT  Workstation ID: [id number]    CT CEREBRAL PERFUSION WITH & WITHOUT CONTRAST    Result Date: 4/18/2024  Impression: No core infarct. No definite ischemia. A small area of Tmax greater than 6 seconds is noted within the left temporal lobe with an approximate volume of 3 mL, which is presumed to be artifactual. If further evaluation of this region is warranted, please consider follow-up with dedicated MRI Electronically Signed: Jose Spencer DO  4/18/2024 8:37 PM EDT  Workstation ID: [id number]    CT Head Without Contrast Stroke Protocol    Result Date: 4/18/2024  Impression: No acute intracranial pathology. Electronically Signed: Alvarez Wilkerson MD  4/18/2024 8:22 PM EDT  Workstation ID: [id number]         Assessment/Plan:    This is a 73-year-old white male, right-handed with multi vascular risk factor who presents to [facility] ED for  evaluation of abrupt onset of right facial paresthesias and droop.  Patient is not a candidate for IV thrombolytic therapy as symptoms are patient's reported started yesterday evening outside the window.  Patient is not a candidate for mechanical thrombectomy no LVO identified on CTA head and neck.    Antiplatelet PTA: none  Anticoagulant PTA: None        Right facial droop and paresthesias  Right eye blurry vision(improving)  Ddx: TIA\ischemic stroke, Bell's palsy, neoplasm, migraine complex.    -TIA\ischemic stroke without IV thrombolytic therapy order set in place  -CT head without contrast negative for hemorrhage, CTA head and neck remarkable for atherosclerosis soft and calcified plaque intracranial multifocal without high-grade stenosis or flow-limiting.  -MRI with and without ordered and pending with thin slices through the brainstem.  -Echo ordered and pending  -A1c and lipid profile ordered and pending  -Will load patient with DAPT aspirin 81 mg p.o. or 300 mg rectal, loading dose with Plavix 300 mg p.o. followed by 75 mg benefits outweigh risk please administer even if patient failed bedside swallow, you can administer an pudding.  Patient will continue DAPT for 21 days then monotherapy with aspirin.  -If patient failed dysphagia NG or OG tube insertion for medication administration.  -Will initiate high intensity statin Lipitor 80 mg p.o. at night and daily for secondary stroke prevention  -NIH\neurocheck per protocol  -Allow gradual normalization of blood pressure, avoid hypotension to avoid hypoperfusion till clearance of MRI  -PT\OT\SLP evaluation  -Neurology stroke will continue to follow-up    Essential hypertension  -Allow gradual normalization of blood pressure till clearance of MRI    Hyperlipidemia  -Lipid profile ordered and pending  -High intensity statin as above  -Recent HDL 39 goal needs to be more than 40  LDL recently 97 goal needs to be less than 70.    Prediabetes  -Recent A1c 6.3  -A1c  ordered and pending  -Diabetes education is needed  -Blood glucose monitoring and management by medicine team    Possible CKD 3a in the setting of hypertension  -Over the last year GFR more than 54-58, creatinine baseline 1.14-1.4  -Avoid nephrotoxin  -Okay for IV fluid hydration will administer 1 bolus of 500 cc normal saline.  -Monitor UOP and I&O  -Trending labs    Prostate cancer  BPH  -Management by medicine team    GERD  -Will recommend Protonix  -Management by medicine team    Overweight  -BMI 26.11  -Complicates all aspects of care  -Counseling on healthy diet and exercise and weight loss.    I discussed plan of care with patient, patient's wife, ED physician.  Neurology stroke will continue to follow-up.  Thank you for letting us participate in patient care.  Adriana Chavarria, VAL  April 18, 2024  20:37 EDT

## 2024-04-19 NOTE — PLAN OF CARE
Problem: Adult Inpatient Plan of Care  Goal: Plan of Care Review  Outcome: Ongoing, Progressing     Problem: Adult Inpatient Plan of Care  Goal: Absence of Hospital-Acquired Illness or Injury  Outcome: Ongoing, Progressing  Intervention: Identify and Manage Fall Risk  Recent Flowsheet Documentation  Taken 4/19/2024 0400 by Stephany Fox RN  Safety Promotion/Fall Prevention:   safety round/check completed   clutter free environment maintained   assistive device/personal items within reach   fall prevention program maintained   gait belt   room organization consistent  Taken 4/19/2024 0200 by Stephany Fox RN  Safety Promotion/Fall Prevention:   safety round/check completed   clutter free environment maintained   assistive device/personal items within reach   fall prevention program maintained   gait belt   room organization consistent  Taken 4/19/2024 0015 by Stephany Fox RN  Safety Promotion/Fall Prevention:   activity supervised   assistive device/personal items within reach   clutter free environment maintained   nonskid shoes/slippers when out of bed   room organization consistent   safety round/check completed  Intervention: Prevent Skin Injury  Recent Flowsheet Documentation  Taken 4/19/2024 0400 by Stephany Fox RN  Body Position: position changed independently  Skin Protection:   tubing/devices free from skin contact   silicone foam dressing in place   incontinence pads utilized  Taken 4/19/2024 0200 by Stephany Fox RN  Body Position: position changed independently  Skin Protection:   tubing/devices free from skin contact   silicone foam dressing in place   incontinence pads utilized  Taken 4/19/2024 0015 by Stephany Fox RN  Body Position: position changed independently  Skin Protection:   adhesive use limited   tubing/devices free from skin contact  Intervention: Prevent and Manage VTE (Venous Thromboembolism) Risk  Recent Flowsheet Documentation  Taken 4/19/2024 0015 by Stephany Fox  RN  Activity Management: activity encouraged  Range of Motion: active ROM (range of motion) encouraged  Intervention: Prevent Infection  Recent Flowsheet Documentation  Taken 4/19/2024 0400 by Stephany Fox RN  Infection Prevention:   environmental surveillance performed   rest/sleep promoted  Taken 4/19/2024 0200 by Stephany Fox RN  Infection Prevention:   environmental surveillance performed   rest/sleep promoted  Taken 4/19/2024 0015 by Stephany Fox RN  Infection Prevention: environmental surveillance performed     Problem: Adult Inpatient Plan of Care  Goal: Absence of Hospital-Acquired Illness or Injury  Intervention: Prevent Skin Injury  Recent Flowsheet Documentation  Taken 4/19/2024 0400 by Stephany Fox RN  Body Position: position changed independently  Skin Protection:   tubing/devices free from skin contact   silicone foam dressing in place   incontinence pads utilized  Taken 4/19/2024 0200 by Stephany Fox RN  Body Position: position changed independently  Skin Protection:   tubing/devices free from skin contact   silicone foam dressing in place   incontinence pads utilized  Taken 4/19/2024 0015 by Stephany Fox RN  Body Position: position changed independently  Skin Protection:   adhesive use limited   tubing/devices free from skin contact     Problem: Adult Inpatient Plan of Care  Goal: Absence of Hospital-Acquired Illness or Injury  Intervention: Prevent Infection  Recent Flowsheet Documentation  Taken 4/19/2024 0400 by Stephany Fox RN  Infection Prevention:   environmental surveillance performed   rest/sleep promoted  Taken 4/19/2024 0200 by Stephany Fox RN  Infection Prevention:   environmental surveillance performed   rest/sleep promoted  Taken 4/19/2024 0015 by Stephany Fox RN  Infection Prevention: environmental surveillance performed     Problem: Adult Inpatient Plan of Care  Goal: Absence of Hospital-Acquired Illness or Injury  Intervention: Prevent and Manage VTE (Venous  Thromboembolism) Risk  Recent Flowsheet Documentation  Taken 4/19/2024 0015 by Stephany Fxo RN  Activity Management: activity encouraged  Range of Motion: active ROM (range of motion) encouraged     Problem: Adult Inpatient Plan of Care  Goal: Plan of Care Review  Outcome: Ongoing, Progressing  Goal: Patient-Specific Goal (Individualized)  Outcome: Ongoing, Progressing  Goal: Absence of Hospital-Acquired Illness or Injury  Outcome: Ongoing, Progressing  Intervention: Identify and Manage Fall Risk  Recent Flowsheet Documentation  Taken 4/19/2024 0400 by Stephany Fox RN  Safety Promotion/Fall Prevention:   safety round/check completed   clutter free environment maintained   assistive device/personal items within reach   fall prevention program maintained   gait belt   room organization consistent  Taken 4/19/2024 0200 by Stephany Fox RN  Safety Promotion/Fall Prevention:   safety round/check completed   clutter free environment maintained   assistive device/personal items within reach   fall prevention program maintained   gait belt   room organization consistent  Taken 4/19/2024 0015 by Stephany Fox RN  Safety Promotion/Fall Prevention:   activity supervised   assistive device/personal items within reach   clutter free environment maintained   nonskid shoes/slippers when out of bed   room organization consistent   safety round/check completed  Intervention: Prevent Skin Injury  Recent Flowsheet Documentation  Taken 4/19/2024 0400 by Stephany Fox RN  Body Position: position changed independently  Skin Protection:   tubing/devices free from skin contact   silicone foam dressing in place   incontinence pads utilized  Taken 4/19/2024 0200 by Stephany Fox RN  Body Position: position changed independently  Skin Protection:   tubing/devices free from skin contact   silicone foam dressing in place   incontinence pads utilized  Taken 4/19/2024 0015 by Stephany Fox RN  Body Position: position changed  independently  Skin Protection:   adhesive use limited   tubing/devices free from skin contact  Intervention: Prevent and Manage VTE (Venous Thromboembolism) Risk  Recent Flowsheet Documentation  Taken 4/19/2024 0015 by Stephany Fox RN  Activity Management: activity encouraged  Range of Motion: active ROM (range of motion) encouraged  Intervention: Prevent Infection  Recent Flowsheet Documentation  Taken 4/19/2024 0400 by Stephany Fox RN  Infection Prevention:   environmental surveillance performed   rest/sleep promoted  Taken 4/19/2024 0200 by Stephany Fox RN  Infection Prevention:   environmental surveillance performed   rest/sleep promoted  Taken 4/19/2024 0015 by Stephany Fox RN  Infection Prevention: environmental surveillance performed  Goal: Optimal Comfort and Wellbeing  Outcome: Ongoing, Progressing  Intervention: Provide Person-Centered Care  Recent Flowsheet Documentation  Taken 4/19/2024 0015 by Stephany Fox RN  Trust Relationship/Rapport:   care explained   questions answered  Goal: Readiness for Transition of Care  Outcome: Ongoing, Progressing  Intervention: Mutually Develop Transition Plan  Recent Flowsheet Documentation  Taken 4/19/2024 0044 by Stephany Fox RN  Transportation Anticipated: family or friend will provide  Patient/Family Anticipated Services at Transition: none  Patient/Family Anticipates Transition to: home  Taken 4/19/2024 0038 by Stephany Fox RN  Equipment Currently Used at Home: none     Problem: Fall Injury Risk  Goal: Absence of Fall and Fall-Related Injury  Outcome: Ongoing, Progressing  Intervention: Identify and Manage Contributors  Recent Flowsheet Documentation  Taken 4/19/2024 0400 by Stephany Fox RN  Medication Review/Management: medications reviewed  Taken 4/19/2024 0200 by Stephany Fox RN  Medication Review/Management: medications reviewed  Taken 4/19/2024 0015 by Stephany Fox RN  Medication Review/Management: medications  reviewed  Intervention: Promote Injury-Free Environment  Recent Flowsheet Documentation  Taken 4/19/2024 0400 by Stephany Fox, RN  Safety Promotion/Fall Prevention:   safety round/check completed   clutter free environment maintained   assistive device/personal items within reach   fall prevention program maintained   gait belt   room organization consistent  Taken 4/19/2024 0200 by Stephany Fox, RN  Safety Promotion/Fall Prevention:   safety round/check completed   clutter free environment maintained   assistive device/personal items within reach   fall prevention program maintained   gait belt   room organization consistent  Taken 4/19/2024 0015 by Stephany Fox, RN  Safety Promotion/Fall Prevention:   activity supervised   assistive device/personal items within reach   clutter free environment maintained   nonskid shoes/slippers when out of bed   room organization consistent   safety round/check completed   Goal Outcome Evaluation:

## 2024-04-19 NOTE — PROGRESS NOTES
[facility]  PROGRESS NOTE    Patient Name: Ahmet Mariee  : 1950  MRN: [id number]    Date of Admission: 2024  Primary Care Physician: Evaristo Gaffney MD    Subjective   Subjective     CC:  F/u stroke rule out    HPI:  Patient resting in bed. Still has right facial droop. States he feels like he has a lot of symptoms consistent with Bell's Palsy.      Objective   Objective     Vital Signs:   Temp:  [97.6 °F (36.4 °C)-97.9 °F (36.6 °C)] 97.7 °F (36.5 °C)  Heart Rate:  [46-72] 51  Resp:  [16-18] 16  BP: (131-162)/(82-99) 140/85     Physical Exam:  Constitutional: No acute distress, awake, alert  HENT: NCAT, mucous membranes moist, right facial drooping  Respiratory: Clear to auscultation bilaterally, respiratory effort normal   Cardiovascular: RRR, no murmurs, rubs, or gallops  Gastrointestinal: soft, nontender, nondistended  Musculoskeletal: No bilateral ankle edema  Psychiatric: Appropriate affect, cooperative  Neurologic: Oriented x 3, speech slightly dysarthric, right mouth droop, no focal deficits  Skin: No rashes      Results Reviewed:  LAB RESULTS:      Lab 24   WBC 5.51  --  7.68   HEMOGLOBIN 15.3  --  17.6   HEMOGLOBIN, POC  --  17.7*  --    HEMATOCRIT 44.4  --  52.4*   HEMATOCRIT POC  --  52*  --    PLATELETS 144  --  166   NEUTROS ABS 3.14  --  5.06   IMMATURE GRANS (ABS) 0.02  --  0.02   LYMPHS ABS 1.32  --  1.48   MONOS ABS 0.79  --  0.83   EOS ABS 0.20  --  0.24   MCV 91.9  --  94.9   APTT  --   --  31.1         Lab 24   SODIUM 139  --  137   POTASSIUM 3.9  --  3.8   CHLORIDE 103  --  101   CO2 26.0  --  21.0*   ANION GAP 10.0  --  15.0   BUN 23  --  26*   CREATININE 1.24 1.50* 1.29*   EGFR 61.8 49.2* 58.9*   GLUCOSE 115*  --  105*   CALCIUM 8.9  --  9.9   MAGNESIUM 1.9  --  2.1   PHOSPHORUS  --   --  3.7   HEMOGLOBIN A1C 5.90*  --   --          Lab  04/18/24 2010   TOTAL PROTEIN 7.8   ALBUMIN 4.5   GLOBULIN 3.3   ALT (SGPT) 32  32   AST (SGOT) 24  24   BILIRUBIN 0.5   ALK PHOS 58         Lab 04/18/24 2010   HSTROP T 9         Lab 04/19/24  0340   CHOLESTEROL 145   LDL CHOL 82   HDL CHOL 33*   TRIGLYCERIDES 171*             Brief Urine Lab Results  (Last result in the past 365 days)        Color   Clarity   Blood   Leuk Est   Nitrite   Protein   CREAT   Urine HCG        06/09/23 0916 Green  Comment: Any Substance that causes an abnormal urine color can alter the accuracy of the chemical reactions.   Clear   Trace   Negative   Negative   30 mg/dL (1+)                   Microbiology Results Abnormal       None            XR Chest 1 View    Result Date: 4/18/2024  XR CHEST 1 VW Date of Exam: 4/18/2024 8:43 PM EDT Indication: Acute Stroke Protocol (onset < 12 hrs) Comparison: Chest x-ray 5/27/2013 Findings: Normal cardiomediastinal silhouette. The lungs are clear. No pleural effusion or pneumothorax. No acute osseous findings.     Impression: Impression: No acute cardiopulmonary findings. Electronically Signed: Roni Tan MD  4/18/2024 9:09 PM EDT  Workstation ID: [id number]    CT Angiogram Head w AI Analysis of LVO    Result Date: 4/18/2024  CT ANGIOGRAM HEAD W AI ANALYSIS OF LVO, CT ANGIOGRAM NECK Date of Exam: 4/18/2024 8:18 PM EDT Indication: Neuro deficit, acute stroke suspected Neuro deficit, acute stroke suspected. Comparison: Same day CT head and CT perfusion Technique: CTA of the head was performed after the uneventful intravenous administration of 115 cc Isovue-370. Reconstructed coronal and sagittal images were also obtained. In addition, a 3-D volume rendered image was created for interpretation. Automated exposure control and iterative reconstruction methods were used. Findings: Contrast opacification: Adequate Aortic Arch: Unremarkable Right: Innominate: Patent Subclavian: Patent Common Carotid: Patent External Carotid:Patent Internal Carotid:  Calcified and noncalcified plaques noted proximally causing approximately 40% stenosis by NASCET criteria. Intracranial ICA: Patent MCA:Patent SG: Patent PCA: Patent Vertebral: Patent Left: Subclavian: Patent Common Carotid: Vascular calcifications noted at the origin with possible mild stenosis. Otherwise unremarkable External Carotid:Patent Internal Carotid: Patent Intracranial ICA: Patent MCA:Patent SG: Patent PCA: Patent Vertebral: Patent Basilar: Patent Soft Tissue: Unremarkable Lungs: Unremarkable Bones: Small calcified granuloma within the left lung apex.     Impression: Impression: No evidence of vessel occlusion, severe stenosis, aneurysm or dissection of the arterial vessels of the head and neck. Mild, approximately 40%, stenosis of the proximal right cervical internal carotid artery secondary to calcified and noncalcified plaques. Possible mild stenosis at the origin of the left common carotid artery secondary to predominately calcified plaques. Electronically Signed: Jose Spencer DO  4/18/2024 8:47 PM EDT  Workstation ID: [id number]    CT Angiogram Neck    Result Date: 4/18/2024  CT ANGIOGRAM HEAD W AI ANALYSIS OF LVO, CT ANGIOGRAM NECK Date of Exam: 4/18/2024 8:18 PM EDT Indication: Neuro deficit, acute stroke suspected Neuro deficit, acute stroke suspected. Comparison: Same day CT head and CT perfusion Technique: CTA of the head was performed after the uneventful intravenous administration of 115 cc Isovue-370. Reconstructed coronal and sagittal images were also obtained. In addition, a 3-D volume rendered image was created for interpretation. Automated exposure control and iterative reconstruction methods were used. Findings: Contrast opacification: Adequate Aortic Arch: Unremarkable Right: Innominate: Patent Subclavian: Patent Common Carotid: Patent External Carotid:Patent Internal Carotid: Calcified and noncalcified plaques noted proximally causing approximately 40% stenosis by NASCET  criteria. Intracranial ICA: Patent MCA:Patent SG: Patent PCA: Patent Vertebral: Patent Left: Subclavian: Patent Common Carotid: Vascular calcifications noted at the origin with possible mild stenosis. Otherwise unremarkable External Carotid:Patent Internal Carotid: Patent Intracranial ICA: Patent MCA:Patent SG: Patent PCA: Patent Vertebral: Patent Basilar: Patent Soft Tissue: Unremarkable Lungs: Unremarkable Bones: Small calcified granuloma within the left lung apex.     Impression: Impression: No evidence of vessel occlusion, severe stenosis, aneurysm or dissection of the arterial vessels of the head and neck. Mild, approximately 40%, stenosis of the proximal right cervical internal carotid artery secondary to calcified and noncalcified plaques. Possible mild stenosis at the origin of the left common carotid artery secondary to predominately calcified plaques. Electronically Signed: Jose Spencer DO  4/18/2024 8:47 PM EDT  Workstation ID: [id number]    CT CEREBRAL PERFUSION WITH & WITHOUT CONTRAST    Result Date: 4/18/2024  CT CEREBRAL PERFUSION W WO CONTRAST Date of Exam: 4/18/2024 8:18 PM EDT Indication: Neuro deficit, acute stroke suspected.  Comparison: Same day head CT Technique: Axial CT images of the brain were obtained prior to and after the administration of 115 cc Isovue-370. Core blood volume, core blood flow, mean transit time, and Tmax images were obtained utilizing the Rapid software protocol. A limited CT angiogram of the head was also performed to measure the blood vessel density. The radiation dose reduction device was turned on for each scan per the ALARA (As Low as Reasonably Achievable) protocol. Findings: Adequate perfusion images. Adequate ROIs. No significant motion artifact. CBF less than 30%: 0 mL Tmax greater than 6 seconds: 3 mL Mismatch volume: 3 mL Mismatch ratio: Infinite An area of Tmax greater than 6 seconds is noted within the left temporal lobe with an approximate volume of 3  mL.     Impression: Impression: No core infarct. No definite ischemia. A small area of Tmax greater than 6 seconds is noted within the left temporal lobe with an approximate volume of 3 mL, which is presumed to be artifactual. If further evaluation of this region is warranted, please consider follow-up with dedicated MRI Electronically Signed: Jose Spencer DO  4/18/2024 8:37 PM EDT  Workstation ID: [id number]    CT Head Without Contrast Stroke Protocol    Result Date: 4/18/2024  CT HEAD WO CONTRAST STROKE PROTOCOL Date of Exam: 4/18/2024 8:08 PM EDT Indication: Neuro deficit, acute, stroke suspected Neuro deficit, acute stroke suspected. Comparison: None available. Technique: Axial CT images were obtained of the head without contrast administration.  Reconstructed coronal images were also obtained. Automated exposure control and iterative construction methods were used. Scan Time: 2015 Results discussed with Dr. Haro at 8:19 a.m. on 4/18/2024. Findings: No intracranial hemorrhage. Gray-white matter differentiation is maintained without evidence of an acute infarction. Multiple foci of decreased attenuation are present within the subcortical, deep cerebral, and periventricular white matter consistent with chronic small vessel/microangiopathic ischemic changes. No extra-axial mass or collection. The ventricles and sulci are prominent commensurate with involutional changes. The posterior fossa appears grossly normal. Sellar and suprasellar structures are normal. Lens replacements. Mucosal thickening in the inferior maxillary sinuses.. The bony calvarium is intact.     Impression: Impression: No acute intracranial pathology. Electronically Signed: Alvarez Wilkerson MD  4/18/2024 8:22 PM EDT  Workstation ID: [id number]         Current medications:  Scheduled Meds:aspirin, 81 mg, Oral, Daily   Or  aspirin, 300 mg, Rectal, Daily  atorvastatin, 80 mg, Oral, Nightly  clopidogrel, 75 mg, Oral, Daily  pantoprazole, 40 mg, Oral,  Q AM  sildenafil, 5 mg, Oral, Daily  sodium chloride, 10 mL, Intravenous, Q12H      Continuous Infusions:   PRN Meds:.  acetaminophen **OR** acetaminophen **OR** acetaminophen    senna-docusate sodium **AND** polyethylene glycol **AND** bisacodyl **AND** bisacodyl    hyoscyamine    melatonin    ondansetron ODT **OR** ondansetron    Polyvinyl Alcohol-Povidone PF    sodium chloride    sodium chloride    sodium chloride    Assessment & Plan   Assessment & Plan     Active Hospital Problems    Diagnosis  POA    **Stroke-like symptoms [R29.90]  Yes    PELON (acute kidney injury) [N17.9]  Unknown    Insomnia [G47.00]  Yes    Irritable bowel syndrome with diarrhea [K58.0]  Yes    Primary hypertension [I10]  Yes    Prostate cancer [C61]  Yes    Gastroesophageal reflux disease without esophagitis [K21.9]  Yes    Facial droop [R29.810]  Yes      Resolved Hospital Problems   No resolved problems to display.        Brief Hospital Course to date:  Ahmet Mariee is a 73 y.o. male with PMHx prostate cancer, pancreatic insufficiency, irritable bowel syndrome, acid reflux and hypertension who presents to the ED for evaluation of right sided facial droop.     Right facial droop - Stroke r/o Vs. Bell's Palsy  - evaluated by stroke navigator in ED  - consult stroke neurology  - CTH, CTP, CTA H/N negative  - MRI brain completed, read pending  - Echocardiogram  - PT/OT, cleared for diet by SLP     Elevated Creatinine  - appears close to baseline  - avoid nephrotoxic agents     HTN  - allowing for permissive HTN, hold BP meds  - on lisinopril / hctz, hold     Prostate cancer  - f/w Dr. Spencer     GERD  - continue PPI     Insomnia  - resume home trazodone  - melatonin prn sleep     IBS  - uses imodium ~ every other day at home, hold for now  - levsin prn     Expected Discharge Location and Transportation: Home  Expected Discharge   Expected Discharge Date: 4/19/2024; Expected Discharge Time:      DVT prophylaxis:  Mechanical DVT  prophylaxis orders are present.         AM-PAC 6 Clicks Score (PT): 19 (04/19/24 0911)    CODE STATUS:   Code Status and Medical Interventions:   Ordered at: 04/18/24 1562     Code Status (Patient has no pulse and is not breathing):    CPR (Attempt to Resuscitate)     Medical Interventions (Patient has pulse or is breathing):    Full Support       Veronica Baron, DO  04/19/24

## 2024-04-19 NOTE — THERAPY DISCHARGE NOTE
Acute Care - Occupational Therapy Discharge  [facility]    Patient Name: Ahmet Mariee  : 1950    MRN: [id number]                              Today's Date: 2024       Admit Date: 2024    Visit Dx:     ICD-10-CM ICD-9-CM   1. Stroke-like symptoms  R29.90 781.99   2. Facial droop  R29.810 781.94   3. Right facial numbness  R20.0 782.0     Patient Active Problem List   Diagnosis    Benign prostatic hyperplasia with lower urinary tract symptoms    Other male erectile dysfunction    Primary hypertension    Multiple renal cysts    OAB (overactive bladder)    Prostate cancer    Hiatal hernia    Gastroesophageal reflux disease without esophagitis    Polycythemia    Irritable bowel syndrome with diarrhea    Liver nodule    Sciatica of left side    Psychogenic fecal incontinence    Chronic prostatitis    Insomnia    Non-alcoholic fatty liver disease    Prediabetes    Impotence of organic origin    Ureteric stone    Benign prostatic hyperplasia with urinary obstruction    Acute prostatitis    Chronic kidney disease    Chronic prostatitis    Multiple renal cysts    Overactive bladder    Primary erectile dysfunction    Benign hypertension    Malignant tumor of prostate    Abdominal pain    Diarrhea    Encounter for health maintenance examination    Fatigue    High prostate specific antigen (PSA)    History of malignant neoplasm of prostate    Hyperglycemia    Induratio penis plastica    Low back pain    Facial droop    Nausea    Pain in joint of left shoulder    Pain in joint of right shoulder    Pain in left knee    Partial thickness rotator cuff tear    Right inguinal hernia    Tear of lateral meniscus of knee    Stroke-like symptoms    PELON (acute kidney injury)     Past Medical History:   Diagnosis Date    Benign prostatic hyperplasia     Cancer     prostate, slow growing, monitored by urology, Naveed Spencer MD    GERD (gastroesophageal reflux disease)     Hypertension     Injury of left rotator  cuff     partial thickness rotator cuff tear    Irritable bowel syndrome      Past Surgical History:   Procedure Laterality Date    CATARACT EXTRACTION W/ INTRAOCULAR LENS  IMPLANT, BILATERAL Bilateral 2022    CYST REMOVAL      corner of right eye    EYE SURGERY Bilateral     cataract surgery    HERNIA REPAIR  05/2013    hiatal    INGUINAL HERNIA REPAIR  05/2021    bilateral    KNEE CARTILAGE SURGERY  2008    left knee meniscus repair    PARTIAL GASTRECTOMY  05/2013    severe hiatal hernia, emergency surgery     UMBILICAL HERNIA REPAIR  12/2013    UMBILICAL HERNIA REPAIR      VASECTOMY  1990      General Information       Row Name 04/19/24 1007          OT Time and Intention    Document Type evaluation;discharge evaluation/summary  -MR     Mode of Treatment occupational therapy  -MR       Row Name 04/19/24 1007          General Information    Patient Profile Reviewed yes  -MR     Prior Level of Function independent:;all household mobility;community mobility;gait;transfer;bed mobility;driving;yard work  pt. declines use of DME, community ambulator, declines any acute falls  -MR     Existing Precautions/Restrictions fall  -MR     Barriers to Rehab medically complex  -MR       Row Name 04/19/24 1007          Living Environment    People in Home spouse  -MR       Row Name 04/19/24 1007          Home Main Entrance    Number of Stairs, Main Entrance none  -MR       Row Name 04/19/24 1007          Stairs Within Home, Primary    Number of Stairs, Within Home, Primary one  -MR     Stair Railings, Within Home, Primary none  -MR       Row Name 04/19/24 1007          Cognition    Orientation Status (Cognition) oriented x 4  -MR       Row Name 04/19/24 1007          Safety Issues, Functional Mobility    Safety Issues Affecting Function (Mobility) awareness of need for assistance;insight into deficits/self-awareness;safety precaution awareness;safety precautions follow-through/compliance;sequencing abilities  -MR     Impairments  Affecting Function (Mobility) postural/trunk control;strength;endurance/activity tolerance  -MR               User Key  (r) = Recorded By, (t) = Taken By, (c) = Cosigned By      Initials Name Provider Type    Anastasiya Woodard OT Occupational Therapist                   Mobility/ADL's       Row Name 04/19/24 1007          Bed Mobility    Bed Mobility supine-sit  -MR     Supine-Sit [place] (Bed Mobility) modified independence  -MR     Assistive Device (Bed Mobility) head of bed elevated  -MR       Row Name 04/19/24 1007          Transfers    Transfers sit-stand transfer  -MR       Row Name 04/19/24 1007          Sit-Stand Transfer    Sit-Stand [place] (Transfers) standby assist;verbal cues  -MR       Row Name 04/19/24 1007          Functional Mobility    Functional Mobility- Ind. Level contact guard assist  -MR     Functional Mobility-Distance (Feet) --  HH distances  -       Row Name 04/19/24 1007          Activities of Daily Living    BADL Assessment/Intervention lower body dressing  -       Row Name 04/19/24 1007          Lower Body Dressing Assessment/Training    [place] Level (Lower Body Dressing) don;socks;set up  -MR     Position (Lower Body Dressing) supported sitting  -MR               User Key  (r) = Recorded By, (t) = Taken By, (c) = Cosigned By      Initials Name Provider Type     Anastasiya Joyner OT Occupational Therapist                   Obj/Interventions       Row Name 04/19/24 1009          Sensory Assessment (Somatosensory)    Sensory Assessment (Somatosensory) UE sensation intact  -       Row Name 04/19/24 1009          Vision Assessment/Intervention    Visual Impairment/Limitations WFL;corrective lenses for reading  -       Row Name 04/19/24 1009          Range of Motion Comprehensive    General Range of Motion bilateral upper extremity ROM WFL  -       Row Name 04/19/24 1009          Strength Comprehensive (MMT)    Comment, General Manual Muscle Testing (MMT) Assessment  ANTHONY saldana 4/5  -MR       Row Name 04/19/24 1009          Balance    Balance Assessment sitting static balance;sitting dynamic balance;standing static balance;standing dynamic balance  -MR     Static Sitting Balance independent  -MR     Dynamic Sitting Balance independent  -MR     Position, Sitting Balance unsupported;sitting in chair  -MR     Static Standing Balance independent  -MR     Dynamic Standing Balance standby assist  -MR     Position/Device Used, Standing Balance unsupported  -MR     Balance Interventions sitting;standing;sit to stand;supported;static;dynamic;occupation based/functional task  -MR               User Key  (r) = Recorded By, (t) = Taken By, (c) = Cosigned By      Initials Name Provider Type    MR Anastasiya Joyner, OT Occupational Therapist                   Goals/Plan    No documentation.                  Clinical Impression       Row Name 04/19/24 1010          Pain Assessment    Pain Intervention(s) Repositioned;Ambulation/increased activity  -MR       Row Name 04/19/24 1010          Pain Scale: FACES Pre/Post-Treatment    Pain: FACES Scale, Pretreatment 2-->hurts little bit  -MR     Posttreatment Pain Rating 2-->hurts little bit  -MR     Pain Location generalized  -MR     Pain Location - face;other (see comments)  jaw  -MR       Row Name 04/19/24 1010          Plan of Care Review    Plan of Care Reviewed With patient  -MR     Progress no change  -MR     Outcome Evaluation Pt presenting near his functional baseline w/ bed mobility, transfers and ADLs. Pt completed HH distances of functional mobility w/ SBA. No IP OT warranted at this time, OT signing off. Recommend home w/ assist when medically appropriate.  -MR       Row Name 04/19/24 1010          Therapy Assessment/Plan (OT)    Criteria for Skilled Therapeutic Interventions Met (OT) no;no problems identified which require skilled intervention  -MR     Therapy Frequency (OT) evaluation only  -MR       Row Name 04/19/24 1010           Therapy Plan Review/Discharge Plan (OT)    Anticipated Discharge Disposition (OT) home with assist  -MR       Row Name 04/19/24 1010          Vital Signs    Pre Systolic BP Rehab 140  -MR     Pre Treatment Diastolic BP 85  -MR     Post Systolic BP Rehab 162  -MR     Post Treatment Diastolic BP 87  -MR     Pretreatment Heart Rate (beats/min) 65  -MR     Posttreatment Heart Rate (beats/min) 65  -MR     Pre SpO2 (%) 99  -MR     O2 Delivery Pre Treatment room air  -MR     O2 Delivery Intra Treatment room air  -MR     Post SpO2 (%) 98  -MR     O2 Delivery Post Treatment room air  -MR     Pre Patient Position Supine  -MR     Intra Patient Position Standing  -MR     Post Patient Position Sitting  -MR       Row Name 04/19/24 1010          Positioning and Restraints    Pre-Treatment Position in bed  -MR     Post Treatment Position chair  -MR     In Chair sitting;with PT  -MR               User Key  (r) = Recorded By, (t) = Taken By, (c) = Cosigned By      Initials Name Provider Type    MR Joyner Anastasiya, OT Occupational Therapist                   Outcome Measures       Row Name 04/19/24 1016          How much help from another is currently needed...    Putting on and taking off regular lower body clothing? 4  -MR     Bathing (including washing, rinsing, and drying) 4  -MR     Toileting (which includes using toilet bed pan or urinal) 4  -MR     Putting on and taking off regular upper body clothing 4  -MR     Taking care of personal grooming (such as brushing teeth) 4  -MR     Eating meals 4  -MR     AM-PAC 6 Clicks Score (OT) 24  -MR       Row Name 04/19/24 0932 04/19/24 0800       How much help from another person do you currently need...    Turning from your back to your side while in flat bed without using bedrails? 4  -SS 4  -JJ    Moving from lying on back to sitting on the side of a flat bed without bedrails? 3  -SS 4  -JJ    Moving to and from a bed to a chair (including a wheelchair)? 3  -SS 4  -JJ    Standing up  from a chair using your arms (e.g., wheelchair, bedside chair)? 3  -SS 4  -JJ    Climbing 3-5 steps with a railing? 3  -SS 3  -JJ    To walk in hospital room? 3  -SS 4  -JJ    AM-PAC 6 Clicks Score (PT) 19  -SS 23  -JJ    Highest Level of Mobility Goal 6 --> Walk 10 steps or more  -SS 7 --> Walk 25 feet or more  -JJ      Row Name 04/19/24 0040          How much help from another person do you currently need...    Turning from your back to your side while in flat bed without using bedrails? 4  -DP     Moving from lying on back to sitting on the side of a flat bed without bedrails? 4  -DP     Moving to and from a bed to a chair (including a wheelchair)? 4  -DP     Standing up from a chair using your arms (e.g., wheelchair, bedside chair)? 4  -DP     Climbing 3-5 steps with a railing? 4  -DP     To walk in hospital room? 4  -DP     AM-PAC 6 Clicks Score (PT) 24  -DP     Highest Level of Mobility Goal 8 --> Walked 250 feet or more  -DP       Row Name 04/19/24 1016 04/19/24 0932       Modified Morton Scale    Pre-Stroke Modified Morton Scale 6 - Unable to determine (UTD) from the medical record documentation  -MR 6 - Unable to determine (UTD) from the medical record documentation  -    Modified Morton Scale 1 - No significant disability despite symptoms.  Able to carry out all usual duties and activities.  -MR 1 - No significant disability despite symptoms.  Able to carry out all usual duties and activities.  -      Row Name 04/19/24 1016 04/19/24 0932       Functional Assessment    Outcome Measure Options AM-PAC 6 Clicks Daily Activity (OT);Modified Morton  -MR AM-PAC 6 Clicks Basic Mobility (PT);Modified Alison  -SS              User Key  (r) = Recorded By, (t) = Taken By, (c) = Cosigned By      Initials Name Provider Type    Tonja Acosta, PT Physical Therapist    Anastasiya Woodard, OT Occupational Therapist    Stephany Choi, RN Registered Nurse    Dariusz Gray RN Registered Nurse                   Occupational Therapy Education       Title: PT OT SLP Therapies (In Progress)       Topic: Occupational Therapy (In Progress)       Point: ADL training (Done)       Description:   Instruct learner(s) on proper safety adaptation and remediation techniques during self care or transfers.   Instruct in proper use of assistive devices.                  Learning Progress Summary             Patient Acceptance, E, VU by MR at 4/19/2024 1020                         Point: Home exercise program (Not Started)       Description:   Instruct learner(s) on appropriate technique for monitoring, assisting and/or progressing therapeutic exercises/activities.                  Learner Progress:  Not documented in this visit.              Point: Precautions (Done)       Description:   Instruct learner(s) on prescribed precautions during self-care and functional transfers.                  Learning Progress Summary             Patient Acceptance, E, VU by MR at 4/19/2024 1020                         Point: Body mechanics (Done)       Description:   Instruct learner(s) on proper positioning and spine alignment during self-care, functional mobility activities and/or exercises.                  Learning Progress Summary             Patient Acceptance, E, VU by MR at 4/19/2024 1020                                         User Key       Initials Effective Dates Name Provider Type Discipline    MR 09/22/22 -  Anastasiya Joyner OT Occupational Therapist OT                  OT Recommendation and Plan  Therapy Frequency (OT): evaluation only  Plan of Care Review  Plan of Care Reviewed With: patient  Progress: no change  Outcome Evaluation: Pt presenting near his functional baseline w/ bed mobility, transfers and ADLs. Pt completed HH distances of functional mobility w/ SBA. No IP OT warranted at this time, OT signing off. Recommend home w/ assist when medically appropriate.  Plan of Care Reviewed With: patient  Outcome Evaluation: Pt  presenting near his functional baseline w/ bed mobility, transfers and ADLs. Pt completed HH distances of functional mobility w/ SBA. No IP OT warranted at this time, OT signing off. Recommend home w/ assist when medically appropriate.     Time Calculation:   Evaluation Complexity (OT)  Review Occupational Profile/Medical/Therapy History Complexity: brief/low complexity  Assessment, Occupational Performance/Identification of Deficit Complexity: 1-3 performance deficits  Clinical Decision Making Complexity (OT): problem focused assessment/low complexity  Overall Complexity of Evaluation (OT): low complexity     Time Calculation- OT       Row Name 04/19/24 1020             Time Calculation- OT    OT Start Time 0900  -MR      OT Received On 04/19/24  -MR         Untimed Charges    OT Eval/Re-eval Minutes 46  -MR         Total Minutes    Untimed Charges Total Minutes 46  -MR       Total Minutes 46  -MR                User Key  (r) = Recorded By, (t) = Taken By, (c) = Cosigned By      Initials Name Provider Type    Anastasiya Woodard, OT Occupational Therapist                         OT Discharge Summary  Anticipated Discharge Disposition (OT): home with assist    Anastasiya Joyner OT  4/19/2024

## 2024-04-19 NOTE — CONSULTS
"Diabetes Education  Assessment/Teaching    Patient Name:  Ahmet Mariee  YOB: 1950  MRN: [id number]  Admit Date:  4/18/2024      Assessment Date:  4/19/2024  Flowsheet Row Most Recent Value   General Information     Referral From: Other (comment)  [stroke protocol]   Height 177.8 cm (70\")   Height Method Actual   Weight 82.6 kg (182 lb)   Weight Method Stated   Pregnancy Assessment    Diabetes History    What type of diabetes do you have? Pre-diabetes   Education Preferences    What areas of diabetes would you like to learn about? avoiding high blood sugar   Nutrition Information    Assessment Topics    DM Goals           Other Comments:     Consult for diabetes education received per stroke protocol. Chart reviewed. Pt was seen at bedside today. Permission given for visit.     Patient's current A1C is 5.9%, prediabetes history noted in chart. Patient states his wife has diabetes. Educated patient on the risk of stroke with uncontrolled diabetes. 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 Patient educated on Pre-diabetes, diabetes and the disease process, types of DM, diagnosis/A1C, monitoring, signs and symptoms of hypoglycemia and hyperglycemia, activity and exercise and how to prevent disease from progressing. Changing behavior and goal setting relative to diet, exercise and healthy lifestyle was emphasized. Education handouts provided, questions answered and pt. advised to call with any other questions or concerns. Provided patient with copy of [facility]'s Prediabetes Basics Booklet. Provided patient with name and phone number for outpatient clinic for any questions or further education.     Patient does not qualify for the follow up stroke class based on the exclusion criteria of not having Diabetes history.    Total time spent reviewing chart, preparing education/materials, providing " education at bedside, and coordinating care approx 45 minutes.    Thank you  Electronically signed by:  Anamaria Baldwin RN  04/19/24 14:04 EDT

## 2024-04-19 NOTE — PLAN OF CARE
Goal Outcome Evaluation:  Plan of Care Reviewed With: patient        Progress: no change  Outcome Evaluation: Pt presenting near his functional baseline w/ bed mobility, transfers and ADLs. Pt completed HH distances of functional mobility w/ SBA. No IP OT warranted at this time, OT signing off. Recommend home w/ assist when medically appropriate.      Anticipated Discharge Disposition (OT): home with assist

## 2024-04-19 NOTE — PLAN OF CARE
Goal Outcome Evaluation:  Plan of Care Reviewed With: patient        Progress:  (initial eval)         Anticipated Discharge Disposition (SLP): home with OP services    SLP Diagnosis: functional speech/language skills, functional cognitive-linguistic skills (04/19/24 0830)  SLP Diagnosis Comments: No observed deficits. Speech is intelligible at conversational level despite facial droop (04/19/24 0830)  SLP Swallowing Diagnosis: R/O pharyngeal dysphagia (04/19/24 0830)

## 2024-04-19 NOTE — THERAPY EVALUATION
Patient Name: Ahmet Mariee  : 1950    MRN: [id number]                              Today's Date: 2024       Admit Date: 2024    Visit Dx:     ICD-10-CM ICD-9-CM   1. Stroke-like symptoms  R29.90 781.99   2. Facial droop  R29.810 781.94   3. Right facial numbness  R20.0 782.0     Patient Active Problem List   Diagnosis    Benign prostatic hyperplasia with lower urinary tract symptoms    Other male erectile dysfunction    Primary hypertension    Multiple renal cysts    OAB (overactive bladder)    Prostate cancer    Hiatal hernia    Gastroesophageal reflux disease without esophagitis    Polycythemia    Irritable bowel syndrome with diarrhea    Liver nodule    Sciatica of left side    Psychogenic fecal incontinence    Chronic prostatitis    Insomnia    Non-alcoholic fatty liver disease    Prediabetes    Impotence of organic origin    Ureteric stone    Benign prostatic hyperplasia with urinary obstruction    Acute prostatitis    Chronic kidney disease    Chronic prostatitis    Multiple renal cysts    Overactive bladder    Primary erectile dysfunction    Benign hypertension    Malignant tumor of prostate    Abdominal pain    Diarrhea    Encounter for health maintenance examination    Fatigue    High prostate specific antigen (PSA)    History of malignant neoplasm of prostate    Hyperglycemia    Induratio penis plastica    Low back pain    Facial droop    Nausea    Pain in joint of left shoulder    Pain in joint of right shoulder    Pain in left knee    Partial thickness rotator cuff tear    Right inguinal hernia    Tear of lateral meniscus of knee    Stroke-like symptoms    PELON (acute kidney injury)     Past Medical History:   Diagnosis Date    Benign prostatic hyperplasia     Cancer     prostate, slow growing, monitored by urology, Naveed Spencer MD    GERD (gastroesophageal reflux disease)     Hypertension     Injury of left rotator cuff     partial thickness rotator cuff tear    Irritable  bowel syndrome      Past Surgical History:   Procedure Laterality Date    CATARACT EXTRACTION W/ INTRAOCULAR LENS  IMPLANT, BILATERAL Bilateral 2022    CYST REMOVAL      corner of right eye    EYE SURGERY Bilateral     cataract surgery    HERNIA REPAIR  05/2013    hiatal    INGUINAL HERNIA REPAIR  05/2021    bilateral    KNEE CARTILAGE SURGERY  2008    left knee meniscus repair    PARTIAL GASTRECTOMY  05/2013    severe hiatal hernia, emergency surgery     UMBILICAL HERNIA REPAIR  12/2013    UMBILICAL HERNIA REPAIR      VASECTOMY  1990      General Information       Row Name 04/19/24 0915          Physical Therapy Time and Intention    Document Type evaluation  -     Mode of Treatment physical therapy  -       Row Name 04/19/24 0915          General Information    Patient Profile Reviewed yes  -SS     Prior Level of Function independent:;all household mobility;community mobility;gait;transfer;bed mobility;driving;yard work  pt. declines use of DME, community ambulator, declines any acute falls  -     Existing Precautions/Restrictions fall  -     Barriers to Rehab medically complex  -SS       Row Name 04/19/24 0915          Living Environment    People in Home spouse  -SS       Row Name 04/19/24 0915          Home Main Entrance    Number of Stairs, Main Entrance none  -SS       Row Name 04/19/24 0915          Stairs Within Home, Primary    Number of Stairs, Within Home, Primary one  -SS     Stair Railings, Within Home, Primary none  -SS       Row Name 04/19/24 0915          Cognition    Orientation Status (Cognition) oriented x 4  -SS       Row Name 04/19/24 0915          Safety Issues, Functional Mobility    Safety Issues Affecting Function (Mobility) awareness of need for assistance;insight into deficits/self-awareness;safety precaution awareness;safety precautions follow-through/compliance;sequencing abilities  -     Impairments Affecting Function (Mobility) postural/trunk  control;strength;endurance/activity tolerance  -               User Key  (r) = Recorded By, (t) = Taken By, (c) = Cosigned By      Initials Name Provider Type    SS Tonja De La Torre PT Physical Therapist                   Mobility       Row Name 04/19/24 0917          Bed Mobility    Comment, (Bed Mobility) up in chair  -       Row Name 04/19/24 0917          Sit-Stand Transfer    Sit-Stand [place] (Transfers) standby assist;verbal cues  -     Assistive Device (Sit-Stand Transfers) other (see comments)  none  -SS     Comment, (Sit-Stand Transfer) VC for sequencing  -       Row Name 04/19/24 0917          Gait/Stairs (Locomotion)    [place] Level (Gait) contact guard;verbal cues  -     Assistive Device (Gait) other (see comments)  none  -SS     Patient was able to Ambulate yes  -     Distance in Feet (Gait) 300  -SS     Deviations/Abnormal Patterns (Gait) bilateral deviations;base of support, narrow  -     Bilateral Gait Deviations forward flexed posture  -     Comment, (Gait/Stairs) Pt. ambulated with a step through gait pattern. VC for upright posture, safety recommendations. No overt LOB or symptoms w/head turns, turning 180 or picking up object from ground. Activity limited by fatigue.  -               User Key  (r) = Recorded By, (t) = Taken By, (c) = Cosigned By      Initials Name Provider Type    SS Tonja De La Torre PT Physical Therapist                   Obj/Interventions       Row Name 04/19/24 0922          Range of Motion Comprehensive    General Range of Motion bilateral lower extremity ROM WFL  -       Row Name 04/19/24 0922          Strength Comprehensive (MMT)    Comment, General Manual Muscle Testing (MMT) Assessment BLE gross 4/5, symmetrical  -       Row Name 04/19/24 0922          Motor Skills    Motor Skills coordination  -     Coordination WFL;bilateral;lower extremity  -       Row Name 04/19/24 0922          Balance    Balance Assessment sitting static  balance;sitting dynamic balance;sit to stand dynamic balance;standing static balance;standing dynamic balance  -SS     Static Sitting Balance independent  -SS     Dynamic Sitting Balance independent  -SS     Position, Sitting Balance unsupported;sitting in chair  -SS     Sit to Stand Dynamic Balance standby assist  -SS     Static Standing Balance standby assist  -SS     Dynamic Standing Balance contact guard  -SS     Position/Device Used, Standing Balance unsupported  -SS     Balance Interventions sitting;standing;sit to stand;supported;static;dynamic  -SS       Row Name 04/19/24 0922          Sensory Assessment (Somatosensory)    Sensory Assessment (Somatosensory) LE sensation intact  -SS               User Key  (r) = Recorded By, (t) = Taken By, (c) = Cosigned By      Initials Name Provider Type    SS Tonja De La Torre, PT Physical Therapist                   Goals/Plan       Row Name 04/19/24 0932          Bed Mobility Goal 1 (PT)    Activity/Assistive Device (Bed Mobility Goal 1, PT) bed mobility activities, all  -SS     Energy Level/Cues Needed (Bed Mobility Goal 1, PT) independent  -SS     Time Frame (Bed Mobility Goal 1, PT) long term goal (LTG);10 days  -       Row Name 04/19/24 0932          Transfer Goal 1 (PT)    Activity/Assistive Device (Transfer Goal 1, PT) sit-to-stand/stand-to-sit;bed-to-chair/chair-to-bed  -SS     Energy Level/Cues Needed (Transfer Goal 1, PT) independent  -SS     Time Frame (Transfer Goal 1, PT) long term goal (LTG);10 days  -       Row Name 04/19/24 0932          Gait Training Goal 1 (PT)    Activity/Assistive Device (Gait Training Goal 1, PT) gait (walking locomotion)  -SS     Energy Level (Gait Training Goal 1, PT) independent  -SS     Distance (Gait Training Goal 1, PT) 800  -SS     Time Frame (Gait Training Goal 1, PT) long term goal (LTG);10 days  -       Row Name 04/19/24 0932          Stairs Goal 1 (PT)    Activity/Assistive Device (Stairs Goal 1, PT)  ascending stairs;descending stairs  -     [place] Level/Cues Needed (Stairs Goal 1, PT) independent  -SS     Number of Stairs (Stairs Goal 1, PT) 1  -SS     Time Frame (Stairs Goal 1, PT) long term goal (LTG);10 days  -       Row Name 04/19/24 0932          Therapy Assessment/Plan (PT)    Planned Therapy Interventions (PT) bed mobility training;gait training;balance training;home exercise program;neuromuscular re-education;patient/family education;postural re-education;ROM (range of motion);stair training;strengthening;stretching;transfer training  -               User Key  (r) = Recorded By, (t) = Taken By, (c) = Cosigned By      Initials Name Provider Type     Tonja De La Torre, PT Physical Therapist                   Clinical Impression       Row Name 04/19/24 0923          Pain    Pain Intervention(s) Repositioned;Elevated;Ambulation/increased activity  -     Additional Documentation Pain Scale: FACES Pre/Post-Treatment (Group)  -[facility] Name 04/19/24 0923          Pain Scale: FACES Pre/Post-Treatment    Pain: FACES Scale, Pretreatment 2-->hurts little bit  -     Posttreatment Pain Rating 2-->hurts little bit  -     Pain Location - Side/Orientation Right  -     Pain Location generalized  -     Pain Location - face  jaw  -       Row Name 04/19/24 0923          Plan of Care Review    Plan of Care Reviewed With patient  -     Outcome Evaluation Pt. presents below baseline function w/generalized weakness affecting his ability to safely participate in functional mobility. He performed transfers and ambulated 300' w/contact guard assist. Activity limited by fatigue. Pt. would benefit from IPPT to address stated deficits.  -       Row Name 04/19/24 0923          Therapy Assessment/Plan (PT)    Rehab Potential (PT) good, to achieve stated therapy goals  -     Criteria for Skilled Interventions Met (PT) yes;meets criteria;skilled treatment is necessary  -     Therapy Frequency (PT)  daily  -SS       Row Name 04/19/24 0923          Vital Signs    Pre Systolic BP Rehab 140  -SS     Pre Treatment Diastolic BP 85  -SS     Post Systolic BP Rehab 162  -SS     Post Treatment Diastolic BP 87  -SS     Pretreatment Heart Rate (beats/min) 63  -SS     Posttreatment Heart Rate (beats/min) 58  -SS     Post SpO2 (%) 98  -SS     O2 Delivery Post Treatment room air  -SS     Pre Patient Position Supine  -SS     Post Patient Position Sitting  -SS       Row Name 04/19/24 0923          Positioning and Restraints    Pre-Treatment Position sitting in chair/recliner  -SS     Post Treatment Position chair  -SS     In Chair notified nsg;reclined;call light within reach;encouraged to call for assist;exit alarm on;legs elevated  -SS               User Key  (r) = Recorded By, (t) = Taken By, (c) = Cosigned By      Initials Name Provider Type    Tonja Acosta, PT Physical Therapist                   Outcome Measures       Row Name 04/19/24 0932 04/19/24 0040       How much help from another person do you currently need...    Turning from your back to your side while in flat bed without using bedrails? 4  -SS 4  -DP    Moving from lying on back to sitting on the side of a flat bed without bedrails? 3  -SS 4  -DP    Moving to and from a bed to a chair (including a wheelchair)? 3  -SS 4  -DP    Standing up from a chair using your arms (e.g., wheelchair, bedside chair)? 3  -SS 4  -DP    Climbing 3-5 steps with a railing? 3  -SS 4  -DP    To walk in hospital room? 3  -SS 4  -DP    AM-PAC 6 Clicks Score (PT) 19  -SS 24  -DP    Highest Level of Mobility Goal 6 --> Walk 10 steps or more  - 8 --> Walked 250 feet or more  -DP      Row Name 04/19/24 0932          Modified [place] Scale    Pre-Stroke Modified [place] Scale 6 - Unable to determine (UTD) from the medical record documentation  -     Modified [place] Scale 1 - No significant disability despite symptoms.  Able to carry out all usual duties and activities.  -        Row Name 04/19/24 0932          Functional Assessment    Outcome Measure Options AM-PAC 6 Clicks Basic Mobility (PT);Modified [place]  -               User Key  (r) = Recorded By, (t) = Taken By, (c) = Cosigned By      Initials Name Provider Type    SS Tonja De La Torre, PT Physical Therapist    Stephany Choi RN Registered Nurse                                 Physical Therapy Education       Title: PT OT SLP Therapies (In Progress)       Topic: Physical Therapy (In Progress)       Point: Mobility training (Done)       Learning Progress Summary             Patient NEIL Cherry, VU,DU,NR by  at 4/19/2024 0933    Comment: Educated pt. safety/technique w/transfers, ambulation, PT POC                         Point: Home exercise program (Not Started)       Learner Progress:  Not documented in this visit.              Point: Body mechanics (Done)       Learning Progress Summary             Patient Jo Ann, E, VU,DU,NR by  at 4/19/2024 0933    Comment: Educated pt. safety/technique w/transfers, ambulation, PT POC                         Point: Precautions (Done)       Learning Progress Summary             Patient Jo Ann NEIL, VU,DU,NR by  at 4/19/2024 0933    Comment: Educated pt. safety/technique w/transfers, ambulation, PT POC                                         User Key       Initials Effective Dates Name Provider Type Discipline     06/01/21 -  Tonja De La Torre, BONY Physical Therapist PT                  PT Recommendation and Plan  Planned Therapy Interventions (PT): bed mobility training, gait training, balance training, home exercise program, neuromuscular re-education, patient/family education, postural re-education, ROM (range of motion), stair training, strengthening, stretching, transfer training  Plan of Care Reviewed With: patient  Outcome Evaluation: Pt. presents below baseline function w/generalized weakness affecting his ability to safely participate in functional mobility. He performed transfers and  ambulated 300' w/contact guard assist. Activity limited by fatigue. Pt. would benefit from IPPT to address stated deficits.     Time Calculation:   PT Evaluation Complexity  History, PT Evaluation Complexity: 3 or more personal factors and/or comorbidities  Examination of Body Systems (PT Eval Complexity): total of 4 or more elements  Clinical Presentation (PT Evaluation Complexity): evolving  Clinical Decision Making (PT Evaluation Complexity): low complexity  Overall Complexity (PT Evaluation Complexity): low complexity     PT Charges       Row Name 04/19/24 0937             Time Calculation    Start Time 0858  -SS      PT Received On 04/19/24  -SS      PT Goal Re-Cert Due Date 04/29/24  -SS         Untimed Charges    PT Eval/Re-eval Minutes 50  -SS         Total Minutes    Untimed Charges Total Minutes 50  -SS       Total Minutes 50  -SS                User Key  (r) = Recorded By, (t) = Taken By, (c) = Cosigned By      Initials Name Provider Type    SS Tonja De La Torre, PT Physical Therapist                  Therapy Charges for Today       Code Description Service Date Service Provider Modifiers Qty    [id number]  PT EVAL LOW COMPLEXITY 4 4/19/2024 Tonja De La Torre, PT GP 1            PT G-Codes  Outcome Measure Options: AM-PAC 6 Clicks Basic Mobility (PT), Modified [place]  AM-PAC 6 Clicks Score (PT): 19  Modified [place] Scale: 1 - No significant disability despite symptoms.  Able to carry out all usual duties and activities.  PT Discharge Summary  Anticipated Discharge Disposition (PT): home with assist    Tonja De La Torre PT  4/19/2024

## 2024-04-19 NOTE — PROGRESS NOTES
Stroke Neurology Progress Note     Subjective     This patient was seen in follow-up for: right-sided Bell's palsy  Present for the encounter were: self, patient    Subjective:  Admitted overnight. Neurologic exam notable for complete right facial droop. Patient reports right yanick-auricular pain. Results of MRI were reviewed. Patient denies questions or concerns.     Objective      Temp:  [97.5 °F (36.4 °C)-97.9 °F (36.6 °C)] 97.5 °F (36.4 °C)  Heart Rate:  [46-74] 74  Resp:  [16-18] 16  BP: (131-162)/(82-99) 143/83            Objective    Physical Exam:  General Appearance: Alert  HEENT: anicteric sclera, no scleral injection. Pain under right ear, no rash noted.  Lungs: respirations appear comfortable, no obvious increased work of breathing  Extremities: No cyanosis or fingernail clubbing   Skin: No rashes in exposed skin areas     Neurological Examination:   Mental status: Alert and oriented. No dysarthria. Able to name and repeat.  Cranial Nerves: Visual fields intact. Extraocular movements intact with no nystagmus. Midline gaze. Complete right facial droop.     Sensory: Decreased sensation to light touch in the right face.   Motor: Normal tone. Absent pronator drift.  Strength:  LUE: 5/5 biceps, triceps,   LLE: 5/5 hip flexion/extension  RUE: 5/5 biceps, triceps,   RLE:  5/5 hip flexion/extension  Cerebellar: Finger-to-nose intact. Heel-to-shin intact. Rapid alternating movements are intact.   Gait: Not assessed.     Labs:    Lab Results   Component Value Date    HGBA1C 5.90 (H) 04/19/2024      Lab Results   Component Value Date    CHOL 145 04/19/2024    TRIG 171 (H) 04/19/2024    HDL 33 (L) 04/19/2024    LDL 82 04/19/2024       Lab Results   Component Value Date    WBC 5.51 04/19/2024    HGB 15.3 04/19/2024    HCT 44.4 04/19/2024    MCV 91.9 04/19/2024     04/19/2024     Lab Results   Component Value Date    GLUCOSE 115 (H) 04/19/2024    BUN 23 04/19/2024    CREATININE 1.24 04/19/2024     "EGFRIFNONA 55 07/06/2022    EGFRIFAFRI 53 02/09/2024    BCR 18.5 04/19/2024    CO2 26.0 04/19/2024    CALCIUM 8.9 04/19/2024    ALBUMIN 4.5 04/18/2024    AST 24 04/18/2024    AST 24 04/18/2024    ALT 32 04/18/2024    ALT 32 04/18/2024       Results from last 7 days   Lab Units 04/19/24 0340 04/18/24 2016 04/18/24 2010   SODIUM mmol/L 139  --  137   POTASSIUM mmol/L 3.9  --  3.8   CHLORIDE mmol/L 103  --  101   CO2 mmol/L 26.0  --  21.0*   BUN mg/dL 23  --  26*   CREATININE mg/dL 1.24 1.50* 1.29*   CALCIUM mg/dL 8.9  --  9.9   BILIRUBIN mg/dL  --   --  0.5   ALK PHOS U/L  --   --  58   ALT (SGPT) U/L  --   --  32  32   AST (SGOT) U/L  --   --  24  24   GLUCOSE mg/dL 115*  --  105*       No results found for: \"BLOODCX\"  UA          6/9/2023    09:16   Urinalysis   Specific Gravity, UA 1.025    Ketones, UA Negative    Blood, UA Trace    Leukocytes, UA Negative    Nitrite, UA Negative      Lab Results   Component Value Date    URINECX No growth 06/09/2023       Results Review:      All images and reports were personally reviewed and I agree with the interpretations except as noted below.      MRI Brain With & Without Contrast 4/19/2024  Impression: 1. No evidence of acute infarction or other acute process. 2. Benign 9 mm meningioma overlying the cephalad right frontal lobe. 3. Mild to moderate nonspecific white matter changes most frequently seen with microvascular ischemic disease.     CT Angiogram Head and Neck 4/18/2024  Impression: No evidence of vessel occlusion, severe stenosis, aneurysm or dissection of the arterial vessels of the head and neck. Mild, approximately 40%, stenosis of the proximal right cervical internal carotid artery secondary to calcified and noncalcified plaques. Possible mild stenosis at the origin of the left common carotid artery secondary to predominately calcified plaques.     CT Head Without Contrast 4/18/2024  Impression: No acute intracranial pathology.          Assessment/Plan "     Assessment:    Ahmet Mariee is a 73 year old male with a past medical history of prostate cancer and hypertension who presented to [facility] Emergency Department with complete right facial droop, right facial numbness, and periauricular pain.  Stroke neurology was consulted for further evaluation.  CT head without contrast 4/18/2024 normal.  CT angiogram head and neck 4/18/2024 reveals 40% right ICA stenoses and mild left ICA stenoses.  MRI brain with and without contrast 4/19/2024 reveals right frontal meningioma and no acute stroke.    # Right Bell's palsy  -Continue prednisone 60 mg daily x 7 days  -Continue valacyclovir 1000 mg daily 3 times daily x 7 days  -Follow-up with primary care physician next available  -Can complete an additional course of steroids if symptoms not improved    # Extracranial atherosclerotic disease: right ICA stenoses 40%  -Continue aspirin 81 mg daily  -Continue clopidogrel 75 mg daily x 90 days per SAMPRISS protocol  -Continue atorvastatin 80 mg daily (LDL 82)  -TTE can be completed as outpatient  -Follow-up in stroke neurology clinic in 3 months      Patient education: call 911 or present to emergency department with any stroke symptom, including unilateral face, arm, or leg weakness, numbness, or paresthesias, unilateral facial droop, speech deficits, dizziness with nausea, vomiting, nystagmus, and incoordination, visual deficits, or severe onset headache.    Stroke neurology will follow until discharge to home. Please call with questions.     Yanira Martins MD  [facility] [place]  04/19/24  15:53 EDT

## 2024-04-20 LAB
QT INTERVAL: 414 MS
QTC INTERVAL: 430 MS

## 2024-04-22 ENCOUNTER — TRANSITIONAL CARE MANAGEMENT TELEPHONE ENCOUNTER (OUTPATIENT)
Dept: CALL CENTER | Facility: HOSPITAL | Age: 74
End: 2024-04-22
Payer: MEDICARE

## 2024-04-22 NOTE — OUTREACH NOTE
Call Center TCM Note      Flowsheet Row Responses   [facility] patient discharged from? [place]   Does the patient have one of the following disease processes/diagnoses(primary or secondary)? Other   TCM attempt successful? No   Unsuccessful attempts Attempt 2            Shila Castorena RN    4/22/2024, 14:49 EDT

## 2024-04-22 NOTE — OUTREACH NOTE
Call Center TCM Note      Flowsheet Row Responses   [facility] patient discharged from? [place]   Does the patient have one of the following disease processes/diagnoses(primary or secondary)? Other   TCM attempt successful? No   Unsuccessful attempts Attempt 1  [Vandana spouse on verbal release - no answer]            Shila Castorena RN    4/22/2024, 14:30 EDT

## 2024-04-23 ENCOUNTER — TRANSITIONAL CARE MANAGEMENT TELEPHONE ENCOUNTER (OUTPATIENT)
Dept: CALL CENTER | Facility: HOSPITAL | Age: 74
End: 2024-04-23
Payer: MEDICARE

## 2024-04-23 ENCOUNTER — OFFICE VISIT (OUTPATIENT)
Dept: FAMILY MEDICINE CLINIC | Facility: CLINIC | Age: 74
End: 2024-04-23
Payer: MEDICARE

## 2024-04-23 VITALS
HEIGHT: 70 IN | RESPIRATION RATE: 21 BRPM | WEIGHT: 182.6 LBS | TEMPERATURE: 97.8 F | HEART RATE: 105 BPM | DIASTOLIC BLOOD PRESSURE: 66 MMHG | BODY MASS INDEX: 26.14 KG/M2 | OXYGEN SATURATION: 97 % | SYSTOLIC BLOOD PRESSURE: 110 MMHG

## 2024-04-23 DIAGNOSIS — G51.0 BELL'S PALSY: ICD-10-CM

## 2024-04-23 DIAGNOSIS — R73.03 PREDIABETES: ICD-10-CM

## 2024-04-23 DIAGNOSIS — I65.21 STENOSIS OF RIGHT CAROTID ARTERY: ICD-10-CM

## 2024-04-23 PROBLEM — I65.22 CAROTID STENOSIS, LEFT: Status: ACTIVE | Noted: 2024-04-23

## 2024-04-23 PROBLEM — I65.22 CAROTID STENOSIS, LEFT: Status: RESOLVED | Noted: 2024-04-23 | Resolved: 2024-04-23

## 2024-04-23 PROCEDURE — 3078F DIAST BP <80 MM HG: CPT | Performed by: STUDENT IN AN ORGANIZED HEALTH CARE EDUCATION/TRAINING PROGRAM

## 2024-04-23 PROCEDURE — 1111F DSCHRG MED/CURRENT MED MERGE: CPT | Performed by: STUDENT IN AN ORGANIZED HEALTH CARE EDUCATION/TRAINING PROGRAM

## 2024-04-23 PROCEDURE — 99495 TRANSJ CARE MGMT MOD F2F 14D: CPT | Performed by: STUDENT IN AN ORGANIZED HEALTH CARE EDUCATION/TRAINING PROGRAM

## 2024-04-23 PROCEDURE — 3074F SYST BP LT 130 MM HG: CPT | Performed by: STUDENT IN AN ORGANIZED HEALTH CARE EDUCATION/TRAINING PROGRAM

## 2024-04-23 NOTE — OUTREACH NOTE
Call Center TCM Note      Flowsheet Row Responses   [facility] patient discharged from? [place]   Does the patient have one of the following disease processes/diagnoses(primary or secondary)? Other   TCM attempt successful? Yes   TCM call completed? Yes  [PCP office visit today, no call needed.]   Wrap up additional comments PCP Dr Gaffney. Patient has arrived in office today for PCP appt. This appt, within 2 business days of discharge, fulfills TCM requirement, no call needed.            Vanessa Smalls RN    4/23/2024, 13:07 EDT

## 2024-04-23 NOTE — PROGRESS NOTES
Transitional Care Follow Up Visit  Subjective     Ahmet Mariee 73 y.o. who presents for a transitional care management visit.    Within 48 business hours after discharge our office contacted him via telephone to coordinate his care and needs.      I reviewed and discussed the details of that call along with the discharge summary, hospital problems, inpatient lab results, inpatient diagnostic studies, and consultation reports.      Current outpatient and discharge medications have been reconciled for the patient.    Reviewed by: Evaristo Gaffney MD        4/19/2024     5:55 PM   Date of TCM Phone Call   [facility]   Date of Admission 4/18/2024   Date of Discharge 4/19/2024   Discharge Disposition Home or Self Care     Risk for Readmission (LACE) Score: 7 (4/19/2024  6:00 AM)      History of Present Illness   Course During Hospital Stay:    Patient mated from 4/18 to 4/19 for right facial droop he underwent stroke workup and found to have Bell's palsy treated with Valtrex and prednisone.  Found to have 40% internal carotid artery stenosis to the right.  Patient discharged with aspirin and Plavix x 3 months.  MRI brain is negative other than meningioma.  Mild to moderate nonspecific white matter changes.    Course since discharge:  Some continued facial droop.     Patient Active Problem List   Diagnosis    Benign prostatic hyperplasia with lower urinary tract symptoms    Other male erectile dysfunction    Primary hypertension    Multiple renal cysts    OAB (overactive bladder)    Prostate cancer    Hiatal hernia    Gastroesophageal reflux disease without esophagitis    Polycythemia    Irritable bowel syndrome with diarrhea    Liver nodule    Sciatica of left side    Psychogenic fecal incontinence    Chronic prostatitis    Insomnia    Non-alcoholic fatty liver disease    Prediabetes    Impotence of organic origin    Ureteric stone    Benign prostatic hyperplasia with urinary obstruction     Acute prostatitis    Chronic kidney disease    Chronic prostatitis    Multiple renal cysts    Overactive bladder    Primary erectile dysfunction    Benign hypertension    Malignant tumor of prostate    Abdominal pain    Diarrhea    Encounter for health maintenance examination    Fatigue    High prostate specific antigen (PSA)    History of malignant neoplasm of prostate    Hyperglycemia    Induratio penis plastica    Low back pain    Facial droop    Nausea    Pain in joint of left shoulder    Pain in joint of right shoulder    Pain in left knee    Partial thickness rotator cuff tear    Right inguinal hernia    Tear of lateral meniscus of knee    Stroke-like symptoms    PELON (acute kidney injury)        Current Outpatient Medications   Medication Instructions    Artificial Tear Ointment (artificial tears) ophthalmic ointment 1 Application, Both Eyes, Daily    aspirin 81 mg, Oral, Daily    atorvastatin (LIPITOR) 40 mg, Oral, Nightly    clopidogrel (PLAVIX) 75 mg, Oral, Daily    esomeprazole (NEXIUM) 20 mg, Oral, 2 Times Daily    hydroCHLOROthiazide (HYDRODIURIL) 12.5 MG tablet TAKE 1 TABLET DAILY    hyoscyamine (ANASPAZ,LEVSIN) 125 mcg, Oral, Every 6 Hours PRN    lisinopril (PRINIVIL,ZESTRIL) 30 MG tablet TAKE 1 TABLET DAILY    loperamide (IMODIUM A-D) 1 mg, Oral, 4 Times Daily PRN    Omega-3 Fatty Acids (fish oil) 1000 MG capsule capsule Patient takes two a day of this.    predniSONE (DELTASONE) 60 mg, Oral, Daily With Breakfast    saccharomyces boulardii (FLORASTOR) 250 mg, Oral, Daily, [employer] otc daily takes one a day    tadalafil (CIALIS) 20 mg, Oral, Daily    traZODone (DESYREL) 50 MG tablet TAKE 1 TO 2 TABLETS NIGHTLY as needed for sleep    triamcinolone (KENALOG) 0.1 % ointment     uribel (URO-MP) 118 MG capsule capsule 118 mg, Oral, 4 Times Daily    valACYclovir (VALTREX) 1,000 mg, Oral, 3 times daily          Objective    /66 (BP Location: Left arm, Patient Position: Sitting, Cuff Size: Adult)    "Pulse 105   Temp 97.8 °F (36.6 °C) (Infrared)   Resp 21   Ht 177.8 cm (70\")   Wt 82.8 kg (182 lb 9.6 oz)   SpO2 97%   BMI 26.20 kg/m²    Physical Exam  Physical Exam  Constitutional:       General: He is not in acute distress.     Appearance: He is not ill-appearing.   Cardiovascular:      Rate and Rhythm: Normal rate and regular rhythm.   Pulmonary:      Effort: Pulmonary effort is normal.      Breath sounds: Normal breath sounds.   Neurological:      Mental Status: He is alert.   Psychiatric:         Thought Content: Thought content normal.     Mild erythema to the lower conjunctive of the right eye with some lower lid drooping    Decreased movement to the right cheek and right forehead.  Sensation intact to the right face      Assessment & Plan   Diagnoses and all orders for this visit:    1. Bell's palsy  -     Ambulatory Referral to Neurology  -Continue prednisone and Valtrex  -Artificial tears during the day and ointment during the night  -He has some mild erythema to the conjunctivo which is likely related to his dry eye but I prefer he see ophthalmology and he will make an appointment for evaluation    2. Stenosis of right carotid artery  -     Ambulatory Referral to Neurology  -Continue aspirin and statin will continue Plavix for 3 months follow-up with neurology in 3 months for monitoring of ICA stenosis    3. Prediabetes  -Improved continue healthy diet    Other orders  -     Artificial Tear Ointment (artificial tears) ophthalmic ointment; Administer 1 Application to both eyes Daily.  Dispense: 5 g; Refill: 0         Evaristo Gaffney MD  [facility]    "

## 2024-04-30 ENCOUNTER — READMISSION MANAGEMENT (OUTPATIENT)
Dept: CALL CENTER | Facility: HOSPITAL | Age: 74
End: 2024-04-30
Payer: MEDICARE

## 2024-04-30 NOTE — OUTREACH NOTE
Medical Week 2 Survey      Flowsheet Row Responses   [facility] patient discharged from? [facility]   Does the patient have one of the following disease processes/diagnoses(primary or secondary)? Other   Week 2 attempt successful? Yes   Call start time 0921   Discharge diagnosis Stroke-like symptoms   Call end time 0924   Meds reviewed with patient/caregiver? Yes   Is the patient having any side effects they believe may be caused by any medication additions or changes? No   Does the patient have all medications ordered at discharge? Yes   Is the patient taking all medications as directed (includes completed medication regime)? Yes   Medication comments Completed steroids   Comments regarding appointments f/u with Stroke Neurology in 3 months.   Does the patient have a primary care provider?  Yes   Does the patient have an appointment with their PCP within 7 days of discharge? Yes   Has the patient kept scheduled appointments due by today? Yes   Psychosocial issues? No   Did the patient receive a copy of their discharge instructions? Yes   Nursing interventions Reviewed instructions with patient   What is the patient's perception of their health status since discharge? Improving   Is the patient/caregiver able to teach back signs and symptoms related to disease process for when to call PCP? Yes   Is the patient/caregiver able to teach back signs and symptoms related to disease process for when to call 911? Yes   Is the patient/caregiver able to teach back the hierarchy of who to call/visit for symptoms/problems? PCP, Specialist, Home health nurse, Urgent Care, ED, 911 Yes   Additional teach back comments States he is doing well and followed up with his PCP.   Week 2 Call Completed? Yes   Graduated Yes   Graduated/Revoked comments Denies questions or needs at this time.   Call end time 0924            Frances DALAL - Licensed Nurse

## 2024-05-13 RX ORDER — LISINOPRIL 30 MG/1
TABLET ORAL
Qty: 90 TABLET | Refills: 3 | Status: SHIPPED | OUTPATIENT
Start: 2024-05-13

## 2024-05-22 ENCOUNTER — TELEPHONE (OUTPATIENT)
Dept: FAMILY MEDICINE CLINIC | Facility: CLINIC | Age: 74
End: 2024-05-22
Payer: MEDICARE

## 2024-05-22 NOTE — TELEPHONE ENCOUNTER
Patient called in response to Divya message and said at his hospital follow up Dr. Loera told him what they did in the hospital was enough so he didn't need to do anymore labs for now.

## 2024-06-19 ENCOUNTER — OFFICE VISIT (OUTPATIENT)
Dept: FAMILY MEDICINE CLINIC | Facility: CLINIC | Age: 74
End: 2024-06-19
Payer: MEDICARE

## 2024-06-19 VITALS
DIASTOLIC BLOOD PRESSURE: 84 MMHG | TEMPERATURE: 98.4 F | WEIGHT: 178 LBS | BODY MASS INDEX: 25.48 KG/M2 | RESPIRATION RATE: 24 BRPM | OXYGEN SATURATION: 98 % | HEART RATE: 66 BPM | HEIGHT: 70 IN | SYSTOLIC BLOOD PRESSURE: 134 MMHG

## 2024-06-19 DIAGNOSIS — R73.03 PREDIABETES: ICD-10-CM

## 2024-06-19 DIAGNOSIS — C61 PROSTATE CANCER: ICD-10-CM

## 2024-06-19 DIAGNOSIS — I65.21 STENOSIS OF RIGHT CAROTID ARTERY: ICD-10-CM

## 2024-06-19 DIAGNOSIS — K21.9 GASTROESOPHAGEAL REFLUX DISEASE WITHOUT ESOPHAGITIS: ICD-10-CM

## 2024-06-19 PROBLEM — Q61.02 MULTIPLE RENAL CYSTS: Status: RESOLVED | Noted: 2018-10-29 | Resolved: 2024-06-19

## 2024-06-19 PROCEDURE — G2211 COMPLEX E/M VISIT ADD ON: HCPCS | Performed by: STUDENT IN AN ORGANIZED HEALTH CARE EDUCATION/TRAINING PROGRAM

## 2024-06-19 PROCEDURE — 3075F SYST BP GE 130 - 139MM HG: CPT | Performed by: STUDENT IN AN ORGANIZED HEALTH CARE EDUCATION/TRAINING PROGRAM

## 2024-06-19 PROCEDURE — 1125F AMNT PAIN NOTED PAIN PRSNT: CPT | Performed by: STUDENT IN AN ORGANIZED HEALTH CARE EDUCATION/TRAINING PROGRAM

## 2024-06-19 PROCEDURE — 3079F DIAST BP 80-89 MM HG: CPT | Performed by: STUDENT IN AN ORGANIZED HEALTH CARE EDUCATION/TRAINING PROGRAM

## 2024-06-19 PROCEDURE — 99214 OFFICE O/P EST MOD 30 MIN: CPT | Performed by: STUDENT IN AN ORGANIZED HEALTH CARE EDUCATION/TRAINING PROGRAM

## 2024-06-19 NOTE — PROGRESS NOTES
Established Patient Office Visit        Subjective      Chief Complaint:  Prediabetes (Prediabetes follow up, minor low back pain per patient, would like psa checked )      History of Present Illness: Ahmet Mariee is a 73 y.o. male who presents for gerd and mild weight loss about 4 lbs. GERD improved bells palsy resolved. No change to bowels no fevers.       Patient Active Problem List   Diagnosis    Benign prostatic hyperplasia with lower urinary tract symptoms    Other male erectile dysfunction    Primary hypertension    Multiple renal cysts    OAB (overactive bladder)    Prostate cancer    Hiatal hernia    Gastroesophageal reflux disease without esophagitis    Polycythemia    Irritable bowel syndrome with diarrhea    Liver nodule    Sciatica of left side    Psychogenic fecal incontinence    Chronic prostatitis    Insomnia    Non-alcoholic fatty liver disease    Prediabetes    Impotence of organic origin    Ureteric stone    Benign prostatic hyperplasia with urinary obstruction    Acute prostatitis    Chronic kidney disease    Chronic prostatitis    Overactive bladder    Primary erectile dysfunction    Benign hypertension    Malignant tumor of prostate    Abdominal pain    Diarrhea    Encounter for health maintenance examination    Fatigue    High prostate specific antigen (PSA)    History of malignant neoplasm of prostate    Hyperglycemia    Induratio penis plastica    Low back pain    Facial droop    Pain in joint of left shoulder    Pain in joint of right shoulder    Pain in left knee    Partial thickness rotator cuff tear    Right inguinal hernia    Tear of lateral meniscus of knee    Stroke-like symptoms    PELON (acute kidney injury)    Stenosis of right carotid artery         Current Outpatient Medications:     atorvastatin (LIPITOR) 40 MG tablet, Take 1 tablet by mouth Every Night., Disp: 90 tablet, Rfl: 0    clopidogrel (PLAVIX) 75 MG tablet, Take 1 tablet by mouth Daily., Disp: 90 tablet, Rfl: 0     "esomeprazole (nexIUM) 20 MG capsule, Take 1 capsule by mouth 2 (Two) Times a Day., Disp: 180 capsule, Rfl: 3    hydroCHLOROthiazide (HYDRODIURIL) 12.5 MG tablet, TAKE 1 TABLET DAILY, Disp: 90 tablet, Rfl: 3    hyoscyamine (ANASPAZ,LEVSIN) 0.125 MG tablet, Take 1 tablet by mouth Every 6 (Six) Hours As Needed for Cramping. (Patient taking differently: Take 1 tablet by mouth 3 (Three) Times a Day With Meals. With meals and at bedtime), Disp: 360 tablet, Rfl: 3    lisinopril (PRINIVIL,ZESTRIL) 30 MG tablet, TAKE 1 TABLET DAILY, Disp: 90 tablet, Rfl: 3    loperamide (IMODIUM A-D) 1 MG/7.5ML oral solution, Take 7.5 mL by mouth 4 (Four) Times a Day As Needed., Disp: , Rfl:     Omega-3 Fatty Acids (fish oil) 1000 MG capsule capsule, Patient takes two a day of this., Disp: , Rfl:     saccharomyces boulardii (FLORASTOR) 250 MG capsule, Take 1 capsule by mouth Daily. [facility] ot daily takes one a day, Disp: , Rfl:     tadalafil (CIALIS) 20 MG tablet, Take 1 tablet by mouth Daily., Disp: , Rfl:     traZODone (DESYREL) 50 MG tablet, TAKE 1 TO 2 TABLETS NIGHTLY as needed for sleep, Disp: 180 tablet, Rfl: 3    triamcinolone (KENALOG) 0.1 % ointment, , Disp: , Rfl:     uribel (URO-MP) 118 MG capsule capsule, Take 1 capsule by mouth 4 (Four) Times a Day. (Patient taking differently: Take 1 capsule by mouth 4 (Four) Times a Day As Needed.), Disp: 30 capsule, Rfl: 2    Artificial Tear Ointment (artificial tears) ophthalmic ointment, Administer 1 Application to both eyes Daily. (Patient not taking: Reported on 6/19/2024), Disp: 5 g, Rfl: 0    aspirin 81 MG EC tablet, Take 1 tablet by mouth Daily for 90 days. (Patient not taking: Reported on 6/19/2024), Disp: 90 tablet, Rfl: 0       Objective     Physical Exam:   Vital Signs:   /84 (BP Location: Left arm, Patient Position: Sitting, Cuff Size: Adult)   Pulse 66   Temp 98.4 °F (36.9 °C) (Temporal)   Resp 24   Ht 177.8 cm (70\")   Wt 80.7 kg (178 lb)   SpO2 98%   BMI 25.54 " kg/m²      Physical Exam  Constitutional:       General: He is not in acute distress.     Appearance: He is not ill-appearing.   Cardiovascular:      Rate and Rhythm: Normal rate and regular rhythm.   Pulmonary:      Effort: Pulmonary effort is normal.      Breath sounds: Normal breath sounds.   Neurological:      Mental Status: He is alert.   Psychiatric:         Thought Content: Thought content normal.   Normal facial strength and sensation.  Normal strength sensation upper extremities bilaterally  Abd soft non tender            Assessment / Plan      Assessment/Plan:   Diagnoses and all orders for this visit:    1. Gastroesophageal reflux disease without esophagitis (Primary)  Cont ppi BID.     2. Prediabetes  Assessment & Plan:  Discussed healthy diet and exercise. Hand out given.     3. Prostate cancer  Overview:  S/p MRI spring of 23 monitoring PSA with Dr Spencer.     4. Stenosis of right carotid artery  40% stenosis   Had easy bleeding with aspirin and plavix combo so he stopped aspirin.  Reasonable to jsut do plavix for 90 days then switch back to just aspirin. Continue statin.       Follow Up:   Return in about 6 months (around 12/12/2024) for Medicare Wellness.    MDM:     Evaristo Gaffney MD  Family Medicine - [place] [facility]

## 2024-07-22 ENCOUNTER — PATIENT MESSAGE (OUTPATIENT)
Dept: FAMILY MEDICINE CLINIC | Facility: CLINIC | Age: 74
End: 2024-07-22
Payer: MEDICARE

## 2024-07-23 RX ORDER — ATORVASTATIN CALCIUM 40 MG/1
40 TABLET, FILM COATED ORAL NIGHTLY
Qty: 90 TABLET | Refills: 3 | Status: SHIPPED | OUTPATIENT
Start: 2024-07-23

## 2024-07-30 ENCOUNTER — TELEPHONE (OUTPATIENT)
Dept: FAMILY MEDICINE CLINIC | Facility: CLINIC | Age: 74
End: 2024-07-30
Payer: MEDICARE

## 2024-07-30 NOTE — TELEPHONE ENCOUNTER
HUB TO RELAY    LVM.   Recommend rapid testing can buy most pharmacies.  If positive test if desired we can do Paxlovid which can reduce risk of severe illness.  If he wants us to perform testing he can make a video visit and we can test him at office.  Alternatively can make office appointment.    Paxlovid is optional if she does not desire Paxlovid would recommend Delsym over-the-counter for cough drink plenty of fluids

## 2024-07-30 NOTE — TELEPHONE ENCOUNTER
Caller: Ahmet Mariee    Relationship: Self    Best call back number:     [phone] (Mobile)     What is the best time to reach you:     ANY TIME    Who are you requesting to speak with (clinical staff, provider,  specific staff member):     DR SCHMIDT OR NURSE    What was the call regarding:     PATIENT STATED HE HAS BEEN EXPOSED TO HIS COVID POSITIVE WIFE AND IS EXPERIENCING SYMPTOMS:    CONGESTION  SORE THROAT  RUNNY NOSE  HEADACHE  COUGH  BODY ACHES    PATIENT HAS NOT BEEN TESTED FOR COVID AND REQUESTED A CALL BACK WITH ANY RECOMMENDATIONS AND/OR NEXT STEPS

## 2024-07-30 NOTE — TELEPHONE ENCOUNTER
Name: Ahmet Mariee      Relationship: Self      Best Callback Number: [phone]      HUB PROVIDED THE RELAY MESSAGE FROM THE OFFICE      PATIENT: VOICED UNDERSTANDING AND HAS NO FURTHER QUESTIONS AT THIS TIME    ADDITIONAL INFORMATION: NO APPOINTMENTS AVAILABLE UNTIL NEXT WEEK. HUB WT TO

## 2024-07-31 ENCOUNTER — TELEPHONE (OUTPATIENT)
Dept: FAMILY MEDICINE CLINIC | Facility: CLINIC | Age: 74
End: 2024-07-31
Payer: MEDICARE

## 2024-07-31 NOTE — TELEPHONE ENCOUNTER
Caller: Ahmet Mariee    Relationship: Self    Best call back number: [phone]    What was the call regarding: PATIENT CALLED STATING THAT HE DID TESTED POSITIVE FOR COVID.  PATIENT STATED THAT SUNDAY NIGHT IS WHEN HIS SYMPTOMS STARTED.

## 2024-07-31 NOTE — TELEPHONE ENCOUNTER
Spoke to patient we discussed risk benefits alternatives Paxlovid.  Patient decided against Paxlovid for now Delsym for cough seek care for worsening

## 2024-08-29 ENCOUNTER — OFFICE VISIT (OUTPATIENT)
Dept: FAMILY MEDICINE CLINIC | Facility: CLINIC | Age: 74
End: 2024-08-29
Payer: MEDICARE

## 2024-08-29 VITALS
OXYGEN SATURATION: 98 % | HEIGHT: 70 IN | HEART RATE: 67 BPM | WEIGHT: 179.4 LBS | BODY MASS INDEX: 25.68 KG/M2 | TEMPERATURE: 98.4 F | RESPIRATION RATE: 20 BRPM | DIASTOLIC BLOOD PRESSURE: 76 MMHG | SYSTOLIC BLOOD PRESSURE: 120 MMHG

## 2024-08-29 DIAGNOSIS — M75.42 ROTATOR CUFF IMPINGEMENT SYNDROME OF LEFT SHOULDER: ICD-10-CM

## 2024-08-29 DIAGNOSIS — N18.31 CKD STAGE 3A, GFR 45-59 ML/MIN: ICD-10-CM

## 2024-08-29 DIAGNOSIS — N41.1 CHRONIC PROSTATITIS: Primary | ICD-10-CM

## 2024-08-29 DIAGNOSIS — M25.511 CHRONIC PAIN OF BOTH SHOULDERS: ICD-10-CM

## 2024-08-29 DIAGNOSIS — M25.512 CHRONIC PAIN OF BOTH SHOULDERS: ICD-10-CM

## 2024-08-29 DIAGNOSIS — G89.29 CHRONIC PAIN OF BOTH SHOULDERS: ICD-10-CM

## 2024-08-29 PROBLEM — Z85.828 HISTORY OF MALIGNANT NEOPLASM OF SKIN: Status: ACTIVE | Noted: 2024-03-26

## 2024-08-29 PROCEDURE — 3078F DIAST BP <80 MM HG: CPT | Performed by: STUDENT IN AN ORGANIZED HEALTH CARE EDUCATION/TRAINING PROGRAM

## 2024-08-29 PROCEDURE — 3074F SYST BP LT 130 MM HG: CPT | Performed by: STUDENT IN AN ORGANIZED HEALTH CARE EDUCATION/TRAINING PROGRAM

## 2024-08-29 PROCEDURE — 99214 OFFICE O/P EST MOD 30 MIN: CPT | Performed by: STUDENT IN AN ORGANIZED HEALTH CARE EDUCATION/TRAINING PROGRAM

## 2024-08-29 PROCEDURE — 1125F AMNT PAIN NOTED PAIN PRSNT: CPT | Performed by: STUDENT IN AN ORGANIZED HEALTH CARE EDUCATION/TRAINING PROGRAM

## 2024-08-29 PROCEDURE — G2211 COMPLEX E/M VISIT ADD ON: HCPCS | Performed by: STUDENT IN AN ORGANIZED HEALTH CARE EDUCATION/TRAINING PROGRAM

## 2024-08-29 RX ORDER — GRANULES FOR ORAL 3 G/1
POWDER ORAL
COMMUNITY
Start: 2024-05-09

## 2024-08-29 RX ORDER — TADALAFIL 5 MG/1
1 TABLET ORAL DAILY
COMMUNITY
Start: 2024-07-09

## 2024-08-29 NOTE — PROGRESS NOTES
Established Patient Office Visit        Subjective      Chief Complaint:  Shoulder Pain (Left shoulder pain and right as well)      History of Present Illness: Ahmet Mariee is a 73 y.o. male who presents for bilateral shoulder pain left worse than right started worsening about 3 weeks ago.      Patient Active Problem List   Diagnosis   • Benign prostatic hyperplasia with lower urinary tract symptoms   • Other male erectile dysfunction   • Primary hypertension   • Multiple renal cysts   • OAB (overactive bladder)   • Prostate cancer   • Hiatal hernia   • Gastroesophageal reflux disease without esophagitis   • Polycythemia   • Irritable bowel syndrome with diarrhea   • Liver nodule   • Sciatica of left side   • Psychogenic fecal incontinence   • Insomnia   • Non-alcoholic fatty liver disease   • Prediabetes   • Impotence of organic origin   • Ureteric stone   • Benign prostatic hyperplasia with urinary obstruction   • Acute prostatitis   • Chronic kidney disease   • Chronic prostatitis   • Overactive bladder   • Primary erectile dysfunction   • Benign hypertension   • Malignant tumor of prostate   • Abdominal pain   • Diarrhea   • Encounter for health maintenance examination   • Fatigue   • High prostate specific antigen (PSA)   • History of malignant neoplasm of prostate   • Hyperglycemia   • Induratio penis plastica   • Low back pain   • Facial droop   • Pain in joint of left shoulder   • Pain in joint of right shoulder   • Pain in left knee   • Partial thickness rotator cuff tear   • Right inguinal hernia   • Tear of lateral meniscus of knee   • Stroke-like symptoms   • PELON (acute kidney injury)   • Stenosis of right carotid artery   • History of malignant neoplasm of skin   • CKD stage 3a, GFR 45-59 ml/min         Current Outpatient Medications:   •  atorvastatin (LIPITOR) 40 MG tablet, Take 1 tablet by mouth Every Night., Disp: 90 tablet, Rfl: 3  •  esomeprazole (nexIUM) 20 MG capsule, Take 1 capsule by  "mouth 2 (Two) Times a Day., Disp: 180 capsule, Rfl: 3  •  fosfomycin (MONUROL) 3 g pack, Take 1 packet by oral route as directed., Disp: , Rfl:   •  hydroCHLOROthiazide (HYDRODIURIL) 12.5 MG tablet, TAKE 1 TABLET DAILY, Disp: 90 tablet, Rfl: 3  •  hyoscyamine (ANASPAZ,LEVSIN) 0.125 MG tablet, Take 1 tablet by mouth Every 6 (Six) Hours As Needed for Cramping. (Patient taking differently: Take 1 tablet by mouth 3 (Three) Times a Day With Meals. With meals and at bedtime), Disp: 360 tablet, Rfl: 3  •  lisinopril (PRINIVIL,ZESTRIL) 30 MG tablet, TAKE 1 TABLET DAILY, Disp: 90 tablet, Rfl: 3  •  loperamide (IMODIUM A-D) 1 MG/7.5ML oral solution, Take 7.5 mL by mouth 4 (Four) Times a Day As Needed., Disp: , Rfl:   •  Omega-3 Fatty Acids (fish oil) 1000 MG capsule capsule, Patient takes two a day of this., Disp: , Rfl:   •  saccharomyces boulardii (FLORASTOR) 250 MG capsule, Take 1 capsule by mouth Daily. [facility] ot daily takes one a day, Disp: , Rfl:   •  tadalafil (CIALIS) 20 MG tablet, Take 1 tablet by mouth Daily. Take one as needed, Disp: , Rfl:   •  tadalafil (CIALIS) 5 MG tablet, Take 1 tablet by mouth Daily. Take one tablet by mouth daily, Disp: , Rfl:   •  traZODone (DESYREL) 50 MG tablet, TAKE 1 TO 2 TABLETS NIGHTLY as needed for sleep, Disp: 180 tablet, Rfl: 3  •  triamcinolone (KENALOG) 0.1 % ointment, , Disp: , Rfl:   •  uribel (URO-MP) 118 MG capsule capsule, Take 1 capsule by mouth 4 (Four) Times a Day. (Patient taking differently: Take 1 capsule by mouth 4 (Four) Times a Day As Needed.), Disp: 30 capsule, Rfl: 2  •  Artificial Tear Ointment (artificial tears) ophthalmic ointment, Administer 1 Application to both eyes Daily. (Patient not taking: Reported on 8/29/2024), Disp: 5 g, Rfl: 0       Objective     Physical Exam:   Vital Signs:   /76 (BP Location: Left arm, Patient Position: Sitting, Cuff Size: Adult)   Pulse 67   Temp 98.4 °F (36.9 °C) (Temporal)   Resp 20   Ht 177.8 cm (70\")   Wt 81.4 " kg (179 lb 6.4 oz)   SpO2 98%   BMI 25.74 kg/m²      Physical Exam  Constitutional:       General: He is not in acute distress.     Appearance: He is not ill-appearing.   Cardiovascular:      Rate and Rhythm: Normal rate and regular rhythm.   Pulmonary:      Effort: Pulmonary effort is normal.      Breath sounds: Normal breath sounds.   Neurological:      Mental Status: He is alert.   Psychiatric:         Thought Content: Thought content normal.   Right shoulder: No weakness to internal/external or abduction.  Mild pain with external rotation mild pain with Esposito  Left shoulder: No weakness to internal/external rotation.  Negative empty can.  Positive Esposito           Assessment / Plan      Assessment/Plan:   Diagnoses and all orders for this visit:    1. Chronic prostatitis (Primary)  Overview:  Reasonable to try off scheduled prostate massage.   Can discuss further with urology      2. Chronic pain of both shoulders  -     Ambulatory Referral to Physical Therapy for Evaluation & Treatment    3. Rotator cuff impingement syndrome of left shoulder  -     Ambulatory Referral to Physical Therapy for Evaluation & Treatment    4. CKD stage 3a, GFR 45-59 ml/min  Assessment & Plan:  Continue lisinopril   Avoid NSAIDS          Avoid NSAIDS due to mild CKD   Tylenol.   Refer to PT.     Consider injection in future if not improving.  Would need an x-ray at that point      Follow Up:   Return if symptoms worsen or fail to improve.    MDM: chronic worsening shoulder pain, med management    Evaristo Gaffney MD  Family Medicine - [facility]

## 2024-09-27 ENCOUNTER — OFFICE VISIT (OUTPATIENT)
Dept: FAMILY MEDICINE CLINIC | Facility: CLINIC | Age: 74
End: 2024-09-27
Payer: MEDICARE

## 2024-09-27 VITALS
DIASTOLIC BLOOD PRESSURE: 72 MMHG | BODY MASS INDEX: 25.94 KG/M2 | SYSTOLIC BLOOD PRESSURE: 104 MMHG | OXYGEN SATURATION: 98 % | HEART RATE: 79 BPM | WEIGHT: 180.8 LBS | TEMPERATURE: 97.7 F | RESPIRATION RATE: 19 BRPM

## 2024-09-27 DIAGNOSIS — R10.9 RIGHT FLANK PAIN: Primary | ICD-10-CM

## 2024-09-27 DIAGNOSIS — N41.1 CHRONIC PROSTATITIS: ICD-10-CM

## 2024-09-27 DIAGNOSIS — Z23 NEED FOR IMMUNIZATION AGAINST INFLUENZA: ICD-10-CM

## 2024-09-27 DIAGNOSIS — K21.9 GASTROESOPHAGEAL REFLUX DISEASE WITHOUT ESOPHAGITIS: ICD-10-CM

## 2024-09-27 DIAGNOSIS — N18.31 CKD STAGE 3A, GFR 45-59 ML/MIN: ICD-10-CM

## 2024-09-27 DIAGNOSIS — M75.40 ROTATOR CUFF IMPINGEMENT SYNDROME, UNSPECIFIED LATERALITY: ICD-10-CM

## 2024-09-27 LAB
BILIRUB BLD-MCNC: NEGATIVE MG/DL
CLARITY, POC: CLEAR
COLOR UR: ABNORMAL
EXPIRATION DATE: ABNORMAL
GLUCOSE UR STRIP-MCNC: NEGATIVE MG/DL
KETONES UR QL: NEGATIVE
LEUKOCYTE EST, POC: NEGATIVE
Lab: ABNORMAL
NITRITE UR-MCNC: NEGATIVE MG/ML
PH UR: 5.5 [PH] (ref 5–8)
PROT UR STRIP-MCNC: ABNORMAL MG/DL
RBC # UR STRIP: NEGATIVE /UL
SP GR UR: 1.02 (ref 1–1.03)
UROBILINOGEN UR QL: ABNORMAL

## 2024-09-27 PROCEDURE — 3078F DIAST BP <80 MM HG: CPT | Performed by: STUDENT IN AN ORGANIZED HEALTH CARE EDUCATION/TRAINING PROGRAM

## 2024-09-27 PROCEDURE — 1125F AMNT PAIN NOTED PAIN PRSNT: CPT | Performed by: STUDENT IN AN ORGANIZED HEALTH CARE EDUCATION/TRAINING PROGRAM

## 2024-09-27 PROCEDURE — 99214 OFFICE O/P EST MOD 30 MIN: CPT | Performed by: STUDENT IN AN ORGANIZED HEALTH CARE EDUCATION/TRAINING PROGRAM

## 2024-09-27 PROCEDURE — 3074F SYST BP LT 130 MM HG: CPT | Performed by: STUDENT IN AN ORGANIZED HEALTH CARE EDUCATION/TRAINING PROGRAM

## 2024-09-27 PROCEDURE — 81003 URINALYSIS AUTO W/O SCOPE: CPT | Performed by: STUDENT IN AN ORGANIZED HEALTH CARE EDUCATION/TRAINING PROGRAM

## 2024-09-27 PROCEDURE — G0008 ADMIN INFLUENZA VIRUS VAC: HCPCS | Performed by: STUDENT IN AN ORGANIZED HEALTH CARE EDUCATION/TRAINING PROGRAM

## 2024-09-27 PROCEDURE — 90662 IIV NO PRSV INCREASED AG IM: CPT | Performed by: STUDENT IN AN ORGANIZED HEALTH CARE EDUCATION/TRAINING PROGRAM

## 2024-11-21 ENCOUNTER — OFFICE VISIT (OUTPATIENT)
Dept: FAMILY MEDICINE CLINIC | Facility: CLINIC | Age: 74
End: 2024-11-21
Payer: MEDICARE

## 2024-11-21 VITALS
DIASTOLIC BLOOD PRESSURE: 80 MMHG | HEIGHT: 70 IN | OXYGEN SATURATION: 98 % | RESPIRATION RATE: 22 BRPM | BODY MASS INDEX: 26.08 KG/M2 | HEART RATE: 70 BPM | WEIGHT: 182.2 LBS | SYSTOLIC BLOOD PRESSURE: 128 MMHG | TEMPERATURE: 98 F

## 2024-11-21 DIAGNOSIS — K21.9 GASTROESOPHAGEAL REFLUX DISEASE WITHOUT ESOPHAGITIS: Primary | ICD-10-CM

## 2024-11-21 DIAGNOSIS — R05.2 SUBACUTE COUGH: ICD-10-CM

## 2024-11-21 DIAGNOSIS — Z00.00 ROUTINE GENERAL MEDICAL EXAMINATION AT A HEALTH CARE FACILITY: ICD-10-CM

## 2024-11-21 DIAGNOSIS — R73.03 PREDIABETES: ICD-10-CM

## 2024-11-21 RX ORDER — ESOMEPRAZOLE MAGNESIUM 40 MG/1
40 CAPSULE, DELAYED RELEASE ORAL 2 TIMES DAILY
Qty: 180 CAPSULE | Refills: 0 | Status: SHIPPED | OUTPATIENT
Start: 2024-11-21

## 2024-11-21 NOTE — PROGRESS NOTES
Established Patient Office Visit        Subjective      Chief Complaint:  Cough (Persistent cough)      History of Present Illness: Ahmet Mariee is a 74 y.o. male who presents for cough. Vomited after cough before bed. Feels cough is secondary to reflux. Worsening cough. No congestion. No fever. No dyspnea.       Patient Active Problem List   Diagnosis    Benign prostatic hyperplasia with lower urinary tract symptoms    Other male erectile dysfunction    Primary hypertension    Multiple renal cysts    OAB (overactive bladder)    Prostate cancer    Hiatal hernia    Gastroesophageal reflux disease without esophagitis    Polycythemia    Irritable bowel syndrome with diarrhea    Liver nodule    Sciatica of left side    Psychogenic fecal incontinence    Insomnia    Non-alcoholic fatty liver disease    Prediabetes    Impotence of organic origin    Ureteric stone    Benign prostatic hyperplasia with urinary obstruction    Acute prostatitis    Chronic kidney disease    Chronic prostatitis    Overactive bladder    Primary erectile dysfunction    Benign hypertension    Malignant tumor of prostate    Abdominal pain    Diarrhea    Encounter for health maintenance examination    Fatigue    High prostate specific antigen (PSA)    History of malignant neoplasm of prostate    Hyperglycemia    Induratio penis plastica    Low back pain    Facial droop    Pain in joint of left shoulder    Pain in joint of right shoulder    Pain in left knee    Partial thickness rotator cuff tear    Right inguinal hernia    Tear of lateral meniscus of knee    Stroke-like symptoms    PELON (acute kidney injury)    Stenosis of right carotid artery    History of malignant neoplasm of skin    CKD stage 3a, GFR 45-59 ml/min         Current Outpatient Medications:     atorvastatin (LIPITOR) 40 MG tablet, Take 1 tablet by mouth Every Night., Disp: 90 tablet, Rfl: 3    esomeprazole (nexIUM) 20 MG capsule, Take 1 capsule by mouth 2 (Two) Times a Day.,  "Disp: 180 capsule, Rfl: 3    hydroCHLOROthiazide (HYDRODIURIL) 12.5 MG tablet, TAKE 1 TABLET DAILY, Disp: 90 tablet, Rfl: 3    hyoscyamine (ANASPAZ,LEVSIN) 0.125 MG tablet, Take 1 tablet by mouth Every 6 (Six) Hours As Needed for Cramping. (Patient taking differently: Take 1 tablet by mouth 3 (Three) Times a Day With Meals. With meals and at bedtime), Disp: 360 tablet, Rfl: 3    lisinopril (PRINIVIL,ZESTRIL) 30 MG tablet, TAKE 1 TABLET DAILY, Disp: 90 tablet, Rfl: 3    loperamide (IMODIUM A-D) 1 MG/7.5ML oral solution, Take 7.5 mL by mouth 4 (Four) Times a Day As Needed., Disp: , Rfl:     Omega-3 Fatty Acids (fish oil) 1000 MG capsule capsule, Patient takes two a day of this., Disp: , Rfl:     saccharomyces boulardii (FLORASTOR) 250 MG capsule, Take 1 capsule by mouth Daily. [facility] ot daily takes one a day, Disp: , Rfl:     tadalafil (CIALIS) 20 MG tablet, Take 1 tablet by mouth Daily. Take one as needed, Disp: , Rfl:     tadalafil (CIALIS) 5 MG tablet, Take 1 tablet by mouth Daily. Take one tablet by mouth daily, Disp: , Rfl:     traZODone (DESYREL) 50 MG tablet, TAKE 1 TO 2 TABLETS NIGHTLY as needed for sleep, Disp: 180 tablet, Rfl: 3    triamcinolone (KENALOG) 0.1 % ointment, , Disp: , Rfl:     uribel (URO-MP) 118 MG capsule capsule, Take 1 capsule by mouth 4 (Four) Times a Day. (Patient taking differently: Take 1 capsule by mouth 4 (Four) Times a Day As Needed.), Disp: 30 capsule, Rfl: 2    Artificial Tear Ointment (artificial tears) ophthalmic ointment, Administer 1 Application to both eyes Daily. (Patient not taking: Reported on 11/21/2024), Disp: 5 g, Rfl: 0    esomeprazole (nexIUM) 40 MG capsule, Take 1 capsule by mouth 2 (Two) Times a Day., Disp: 180 capsule, Rfl: 0       Objective     Physical Exam:   Vital Signs:   /80 (BP Location: Left arm, Patient Position: Sitting, Cuff Size: Adult)   Pulse 70   Temp 98 °F (36.7 °C) (Temporal)   Resp 22   Ht 177.8 cm (70\")   Wt 82.6 kg (182 lb 3.2 oz)  "  SpO2 98%   BMI 26.14 kg/m²      Physical Exam  Constitutional:       General: He is not in acute distress.     Appearance: He is not ill-appearing.   Cardiovascular:      Rate and Rhythm: Normal rate and regular rhythm.   Pulmonary:      Effort: Pulmonary effort is normal.      Breath sounds: Normal breath sounds.  No rales or wheeze  Neurological:      Mental Status: He is alert.   Psychiatric:         Thought Content: Thought content normal.   No epigastric mass no tenderness         Assessment / Plan      Assessment/Plan:   Diagnoses and all orders for this visit:    1. Gastroesophageal reflux disease without esophagitis (Primary)  -     esomeprazole (nexIUM) 40 MG capsule; Take 1 capsule by mouth 2 (Two) Times a Day.  Dispense: 180 capsule; Refill: 0    2. Subacute cough  -     esomeprazole (nexIUM) 40 MG capsule; Take 1 capsule by mouth 2 (Two) Times a Day.  Dispense: 180 capsule; Refill: 0    3. Routine general medical examination at a health care facility  -     CBC (No Diff); Future  -     Comprehensive Metabolic Panel; Future  -     Lipid Panel; Future  -     Hemoglobin A1c; Future  -     TSH Rfx On Abnormal To Free T4; Future    4. Prediabetes  -     Hemoglobin A1c; Future       Having some worsening cough likely related to reflux.  Not having other symptoms.  Increase Nexium 40 mg to twice daily for 2 months then decrease back to 20 twice daily continue aggressive lifestyle treatments including upward angle with sleep and early dinner.  Follow-up if worsening or not improving    Follow Up:   No follow-ups on file.    MDM:     Evaristo Gaffney MD  Family Medicine - [place]

## 2024-11-26 DIAGNOSIS — I13.10 HYPERTENSIVE HEART AND KIDNEY DISEASE WITHOUT HEART FAILURE AND WITH STAGE 3A CHRONIC KIDNEY DISEASE: ICD-10-CM

## 2024-11-26 DIAGNOSIS — N18.31 HYPERTENSIVE HEART AND KIDNEY DISEASE WITHOUT HEART FAILURE AND WITH STAGE 3A CHRONIC KIDNEY DISEASE: ICD-10-CM

## 2024-11-26 RX ORDER — HYDROCHLOROTHIAZIDE 12.5 MG/1
TABLET ORAL
Qty: 90 TABLET | Refills: 3 | Status: SHIPPED | OUTPATIENT
Start: 2024-11-26

## 2024-12-06 ENCOUNTER — TELEPHONE (OUTPATIENT)
Dept: FAMILY MEDICINE CLINIC | Facility: CLINIC | Age: 74
End: 2024-12-06

## 2024-12-06 ENCOUNTER — OFFICE VISIT (OUTPATIENT)
Dept: FAMILY MEDICINE CLINIC | Facility: CLINIC | Age: 74
End: 2024-12-06
Payer: MEDICARE

## 2024-12-06 VITALS
SYSTOLIC BLOOD PRESSURE: 102 MMHG | RESPIRATION RATE: 20 BRPM | HEART RATE: 87 BPM | DIASTOLIC BLOOD PRESSURE: 74 MMHG | OXYGEN SATURATION: 94 % | HEIGHT: 70 IN | WEIGHT: 183.6 LBS | TEMPERATURE: 99.1 F | BODY MASS INDEX: 26.28 KG/M2

## 2024-12-06 DIAGNOSIS — R05.9 COUGH, UNSPECIFIED TYPE: Primary | ICD-10-CM

## 2024-12-06 DIAGNOSIS — R09.89 SYMPTOMS OF UPPER RESPIRATORY INFECTION (URI): Primary | ICD-10-CM

## 2024-12-06 DIAGNOSIS — J40 BRONCHITIS: ICD-10-CM

## 2024-12-06 LAB
EXPIRATION DATE: NORMAL
FLUAV AG NPH QL: NEGATIVE
FLUBV AG NPH QL: NEGATIVE
INTERNAL CONTROL: NORMAL
Lab: NORMAL
S PYO AG THROAT QL: NEGATIVE
SARS-COV-2 AG UPPER RESP QL IA.RAPID: NOT DETECTED

## 2024-12-06 PROCEDURE — 87426 SARSCOV CORONAVIRUS AG IA: CPT | Performed by: STUDENT IN AN ORGANIZED HEALTH CARE EDUCATION/TRAINING PROGRAM

## 2024-12-06 PROCEDURE — 87880 STREP A ASSAY W/OPTIC: CPT | Performed by: STUDENT IN AN ORGANIZED HEALTH CARE EDUCATION/TRAINING PROGRAM

## 2024-12-06 PROCEDURE — 87804 INFLUENZA ASSAY W/OPTIC: CPT | Performed by: STUDENT IN AN ORGANIZED HEALTH CARE EDUCATION/TRAINING PROGRAM

## 2024-12-06 PROCEDURE — 1160F RVW MEDS BY RX/DR IN RCRD: CPT | Performed by: STUDENT IN AN ORGANIZED HEALTH CARE EDUCATION/TRAINING PROGRAM

## 2024-12-06 PROCEDURE — 1159F MED LIST DOCD IN RCRD: CPT | Performed by: STUDENT IN AN ORGANIZED HEALTH CARE EDUCATION/TRAINING PROGRAM

## 2024-12-06 PROCEDURE — 1125F AMNT PAIN NOTED PAIN PRSNT: CPT | Performed by: STUDENT IN AN ORGANIZED HEALTH CARE EDUCATION/TRAINING PROGRAM

## 2024-12-06 PROCEDURE — 3078F DIAST BP <80 MM HG: CPT | Performed by: STUDENT IN AN ORGANIZED HEALTH CARE EDUCATION/TRAINING PROGRAM

## 2024-12-06 PROCEDURE — 99213 OFFICE O/P EST LOW 20 MIN: CPT | Performed by: STUDENT IN AN ORGANIZED HEALTH CARE EDUCATION/TRAINING PROGRAM

## 2024-12-06 PROCEDURE — 3074F SYST BP LT 130 MM HG: CPT | Performed by: STUDENT IN AN ORGANIZED HEALTH CARE EDUCATION/TRAINING PROGRAM

## 2024-12-06 RX ORDER — GUAIFENESIN 200 MG/10ML
200 LIQUID ORAL 3 TIMES DAILY PRN
Qty: 180 ML | Refills: 0 | Status: SHIPPED | OUTPATIENT
Start: 2024-12-06

## 2024-12-06 RX ORDER — PROMETHAZINE HYDROCHLORIDE, PHENYLEPHRINE HYDROCHLORIDE AND CODEINE PHOSPHATE 6.25; 5; 1 MG/5ML; MG/5ML; MG/5ML
5 SOLUTION ORAL EVERY 6 HOURS PRN
Qty: 118 ML | Refills: 0 | Status: SHIPPED | OUTPATIENT
Start: 2024-12-06

## 2024-12-06 RX ORDER — GUAIFENESIN 200 MG/10ML
200 LIQUID ORAL 3 TIMES DAILY PRN
Qty: 180 ML | Refills: 0 | Status: SHIPPED | OUTPATIENT
Start: 2024-12-06 | End: 2024-12-06

## 2024-12-06 RX ORDER — METHYLPREDNISOLONE 4 MG/1
TABLET ORAL
Qty: 21 TABLET | Refills: 0 | Status: SHIPPED | OUTPATIENT
Start: 2024-12-06

## 2024-12-06 RX ORDER — AZITHROMYCIN 250 MG/1
TABLET, FILM COATED ORAL
Qty: 6 TABLET | Refills: 0 | Status: SHIPPED | OUTPATIENT
Start: 2024-12-06

## 2024-12-06 NOTE — TELEPHONE ENCOUNTER
Caller: Ahmet Mariee Art    Relationship: Self    Best call back number: [phone]     What is the best time to reach you: ANY    Who are you requesting to speak with (clinical staff, provider,  specific staff member): NURSE    Do you know the name of the person who called: PATIENT    What was the call regarding: PATIENT SAW DR PIECRE.  HAS COUGH.  PATIENT WANTED A STRONG COUGH MEDICATION BUT GOT GUAIFENESIN.  THE PHARMACY TOLD HIM IT WAS WEAKER THAN MUCINEX OVER THE COUNTER, HE WOULD LIKE SOMETHING STRONGER.      Is it okay if the provider responds through MyChart: PHONE CALL PLEASE

## 2024-12-06 NOTE — PROGRESS NOTES
"Chief Complaint  URI (Cough, congestion, mild sore throat that started middle of last week and progressed since. )    History of Present Illness      Main symptom: cough. The cough is not typically productive but scant at times. It is gray when it comes up.   Began: a week ago  Sick contacts: none he is aware of.   Fever has been low grade  Sore throat is present mildly per patient   SOB/cp is not present  He does not have any diarrhea or vomiting  Ear pain is not present  No body aches today  He denies any urinary symptoms.     The following portions of the patient's history were reviewed and updated as appropriate: allergies, current medications, past family history, past medical history, past social history, past surgical history, and problem list.    OBJECTIVE:  /74   Pulse 87   Temp 99.1 °F (37.3 °C) (Temporal)   Resp 20   Ht 177.8 cm (70\")   Wt 83.3 kg (183 lb 9.6 oz)   SpO2 94%   BMI 26.34 kg/m²       Physical Exam  Constitutional:       General: He is not in acute distress.     Appearance: Normal appearance.   HENT:      Head: Normocephalic and atraumatic.      Right Ear: Tympanic membrane normal.      Left Ear: Tympanic membrane normal.      Mouth/Throat:      Pharynx: No oropharyngeal exudate or posterior oropharyngeal erythema.   Eyes:      Extraocular Movements: Extraocular movements intact.   Cardiovascular:      Rate and Rhythm: Normal rate and regular rhythm.      Heart sounds: No murmur heard.  Pulmonary:      Effort: Pulmonary effort is normal. No respiratory distress.      Breath sounds: Normal breath sounds. No stridor. No wheezing, rhonchi or rales.   Skin:     Findings: No rash.   Neurological:      General: No focal deficit present.      Mental Status: He is alert.   Psychiatric:         Mood and Affect: Mood normal.                    Assessment and Plan   Diagnoses and all orders for this visit:    1. Bronchitis (Primary)      I asked him to continue with supportive care. Given " timeline of symptoms of productive cough and sore throat I am concerned for bacterial bronchitis. Will treat with antibiotic and steroid. I let the patient know that this medication can cause increased blood pressure, glucose, anxiety, depression, and insomnia. Patient will seek care for any depression, elevated mood, suicidal thoughts. His o2 is 93 today. I asked him to obtain a pulse ox from the pharmacy today and go to the er for any level below 90. I asked him to f u w pcp next week.   Advised the patient to seek care right away for any worsening new or non resolving symptoms.       Return in about 1 week (around 12/13/2024) for bronchitis check.       Sasha Rojas D.O.  [facility] [place]

## 2024-12-16 ENCOUNTER — LAB (OUTPATIENT)
Dept: LAB | Facility: HOSPITAL | Age: 74
End: 2024-12-16
Payer: MEDICARE

## 2024-12-16 DIAGNOSIS — Z00.00 ROUTINE GENERAL MEDICAL EXAMINATION AT A HEALTH CARE FACILITY: ICD-10-CM

## 2024-12-16 DIAGNOSIS — R73.03 PREDIABETES: ICD-10-CM

## 2024-12-16 LAB
ALBUMIN SERPL-MCNC: 4.4 G/DL (ref 3.5–5.2)
ALBUMIN/GLOB SERPL: 1.3 G/DL
ALP SERPL-CCNC: 89 U/L (ref 39–117)
ALT SERPL W P-5'-P-CCNC: 79 U/L (ref 1–41)
ANION GAP SERPL CALCULATED.3IONS-SCNC: 12.3 MMOL/L (ref 5–15)
AST SERPL-CCNC: 35 U/L (ref 1–40)
BILIRUB SERPL-MCNC: 0.9 MG/DL (ref 0–1.2)
BUN SERPL-MCNC: 23 MG/DL (ref 8–23)
BUN/CREAT SERPL: 15.8 (ref 7–25)
CALCIUM SPEC-SCNC: 9.8 MG/DL (ref 8.6–10.5)
CHLORIDE SERPL-SCNC: 100 MMOL/L (ref 98–107)
CHOLEST SERPL-MCNC: 127 MG/DL (ref 0–200)
CO2 SERPL-SCNC: 27.7 MMOL/L (ref 22–29)
CREAT SERPL-MCNC: 1.46 MG/DL (ref 0.76–1.27)
DEPRECATED RDW RBC AUTO: 42.8 FL (ref 37–54)
EGFRCR SERPLBLD CKD-EPI 2021: 50.5 ML/MIN/1.73
ERYTHROCYTE [DISTWIDTH] IN BLOOD BY AUTOMATED COUNT: 12.5 % (ref 12.3–15.4)
GLOBULIN UR ELPH-MCNC: 3.4 GM/DL
GLUCOSE SERPL-MCNC: 153 MG/DL (ref 65–99)
HBA1C MFR BLD: 6.9 % (ref 4.8–5.6)
HCT VFR BLD AUTO: 53.1 % (ref 37.5–51)
HDLC SERPL-MCNC: 41 MG/DL (ref 40–60)
HGB BLD-MCNC: 18.6 G/DL (ref 13–17.7)
LDLC SERPL CALC-MCNC: 51 MG/DL (ref 0–100)
LDLC/HDLC SERPL: 1.02 {RATIO}
MCH RBC QN AUTO: 33.3 PG (ref 26.6–33)
MCHC RBC AUTO-ENTMCNC: 35 G/DL (ref 31.5–35.7)
MCV RBC AUTO: 95 FL (ref 79–97)
PLATELET # BLD AUTO: 204 10*3/MM3 (ref 140–450)
PMV BLD AUTO: 10.4 FL (ref 6–12)
POTASSIUM SERPL-SCNC: 3.9 MMOL/L (ref 3.5–5.2)
PROT SERPL-MCNC: 7.8 G/DL (ref 6–8.5)
RBC # BLD AUTO: 5.59 10*6/MM3 (ref 4.14–5.8)
SODIUM SERPL-SCNC: 140 MMOL/L (ref 136–145)
TRIGL SERPL-MCNC: 220 MG/DL (ref 0–150)
TSH SERPL DL<=0.05 MIU/L-ACNC: 0.87 UIU/ML (ref 0.27–4.2)
VLDLC SERPL-MCNC: 35 MG/DL (ref 5–40)
WBC NRBC COR # BLD AUTO: 8.23 10*3/MM3 (ref 3.4–10.8)

## 2024-12-16 PROCEDURE — 80061 LIPID PANEL: CPT

## 2024-12-16 PROCEDURE — 83036 HEMOGLOBIN GLYCOSYLATED A1C: CPT

## 2024-12-16 PROCEDURE — 84443 ASSAY THYROID STIM HORMONE: CPT

## 2024-12-16 PROCEDURE — 80053 COMPREHEN METABOLIC PANEL: CPT

## 2024-12-16 PROCEDURE — 85027 COMPLETE CBC AUTOMATED: CPT

## 2024-12-19 ENCOUNTER — OFFICE VISIT (OUTPATIENT)
Dept: FAMILY MEDICINE CLINIC | Facility: CLINIC | Age: 74
End: 2024-12-19
Payer: MEDICARE

## 2024-12-19 VITALS
HEART RATE: 82 BPM | HEIGHT: 70 IN | RESPIRATION RATE: 20 BRPM | OXYGEN SATURATION: 99 % | DIASTOLIC BLOOD PRESSURE: 74 MMHG | WEIGHT: 185 LBS | SYSTOLIC BLOOD PRESSURE: 116 MMHG | BODY MASS INDEX: 26.48 KG/M2 | TEMPERATURE: 98.4 F

## 2024-12-19 DIAGNOSIS — F43.21 GRIEF: ICD-10-CM

## 2024-12-19 DIAGNOSIS — K21.9 GASTROESOPHAGEAL REFLUX DISEASE WITHOUT ESOPHAGITIS: Primary | ICD-10-CM

## 2024-12-19 DIAGNOSIS — R10.9 ABDOMINAL WALL PAIN: ICD-10-CM

## 2024-12-19 DIAGNOSIS — Z00.00 ENCOUNTER FOR ROUTINE ADULT HEALTH EXAMINATION WITHOUT ABNORMAL FINDINGS: ICD-10-CM

## 2024-12-19 PROCEDURE — G0439 PPPS, SUBSEQ VISIT: HCPCS | Performed by: STUDENT IN AN ORGANIZED HEALTH CARE EDUCATION/TRAINING PROGRAM

## 2024-12-19 PROCEDURE — 1170F FXNL STATUS ASSESSED: CPT | Performed by: STUDENT IN AN ORGANIZED HEALTH CARE EDUCATION/TRAINING PROGRAM

## 2024-12-19 PROCEDURE — 99397 PER PM REEVAL EST PAT 65+ YR: CPT | Performed by: STUDENT IN AN ORGANIZED HEALTH CARE EDUCATION/TRAINING PROGRAM

## 2024-12-19 PROCEDURE — 1125F AMNT PAIN NOTED PAIN PRSNT: CPT | Performed by: STUDENT IN AN ORGANIZED HEALTH CARE EDUCATION/TRAINING PROGRAM

## 2024-12-19 PROCEDURE — 3078F DIAST BP <80 MM HG: CPT | Performed by: STUDENT IN AN ORGANIZED HEALTH CARE EDUCATION/TRAINING PROGRAM

## 2024-12-19 PROCEDURE — 3074F SYST BP LT 130 MM HG: CPT | Performed by: STUDENT IN AN ORGANIZED HEALTH CARE EDUCATION/TRAINING PROGRAM

## 2024-12-19 NOTE — PROGRESS NOTES
Subjective   The ABCs of the Annual Wellness Visit  Medicare Wellness Visit      Ahmet Mariee is a 74 y.o. patient who presents for a Medicare Wellness Visit.    The following portions of the patient's history were reviewed and   updated as appropriate: allergies, current medications, past family history, past medical history, past social history, past surgical history, and problem list.    Having side pain     Compared to one year ago, the patient's physical   health is worse. Recent death of child   Compared to one year ago, the patient's mental   health is worse. Recent death of child     Recent Hospitalizations:  This patient has had a [facility] admission record on file within the last 365 days.  Current Medical Providers:  Patient Care Team:  Evaristo Gaffney MD as PCP - General (Family Medicine)  Naveed Spencer Jr., MD as Consulting Physician (Urology)    Outpatient Medications Prior to Visit   Medication Sig Dispense Refill    atorvastatin (LIPITOR) 40 MG tablet Take 1 tablet by mouth Every Night. 90 tablet 3    esomeprazole (nexIUM) 40 MG capsule Take 1 capsule by mouth 2 (Two) Times a Day. 180 capsule 0    guaifenesin (ROBITUSSIN) 100 MG/5ML liquid Take 10 mL by mouth 3 (Three) Times a Day As Needed for Cough. 180 mL 0    hydroCHLOROthiazide 12.5 MG tablet TAKE 1 TABLET DAILY 90 tablet 3    hyoscyamine (ANASPAZ,LEVSIN) 0.125 MG tablet Take 1 tablet by mouth Every 6 (Six) Hours As Needed for Cramping. (Patient taking differently: Take 1 tablet by mouth 3 (Three) Times a Day With Meals. With meals and at bedtime) 360 tablet 3    lisinopril (PRINIVIL,ZESTRIL) 30 MG tablet TAKE 1 TABLET DAILY 90 tablet 3    loperamide (IMODIUM A-D) 1 MG/7.5ML oral solution Take 7.5 mL by mouth 4 (Four) Times a Day As Needed.      Omega-3 Fatty Acids (fish oil) 1000 MG capsule capsule Patient takes two a day of this.      saccharomyces boulardii (FLORASTOR) 250 MG capsule Take 1 capsule by mouth Daily. [facility] otc daily takes one a day      tadalafil (CIALIS) 20 MG tablet Take 1 tablet by mouth Daily. Take one as needed      tadalafil (CIALIS) 5 MG tablet Take 1 tablet by mouth Daily. Take one tablet by mouth daily      traZODone (DESYREL) 50 MG tablet TAKE 1 TO 2 TABLETS NIGHTLY as needed for sleep 180 tablet 3    triamcinolone (KENALOG) 0.1 % ointment       uribel (URO-MP) 118 MG capsule capsule Take 1 capsule by mouth 4 (Four) Times a Day. (Patient taking differently: Take 1 capsule by mouth 4 (Four) Times a Day As Needed.) 30 capsule 2    Artificial Tear Ointment (artificial tears) ophthalmic ointment Administer 1 Application to both eyes Daily. (Patient not taking: Reported on 12/19/2024) 5 g 0    azithromycin (ZITHROMAX) 250 MG tablet Take 2 tablets the first day, then 1 tablet daily for 4 days. (Patient not taking: Reported on 12/19/2024) 6 tablet 0    esomeprazole (nexIUM) 20 MG capsule Take 1 capsule by mouth 2 (Two) Times a Day. (Patient not taking: Reported on 12/19/2024) 180 capsule 3    Promethazine-Phenyleph-Codeine 6.25-5-10 MG/5ML syrup syrup Take 5 mL by mouth Every 6 (Six) Hours As Needed (cough). (Patient not taking: Reported on 12/19/2024) 118 mL 0    methylPREDNISolone (MEDROL) 4 MG dose pack Take as directed on package instructions. (Patient not taking: Reported on 12/19/2024) 21 tablet 0     No facility-administered medications prior to visit.     No opioid medication identified on active medication list. I have reviewed chart for other potential  high risk medication/s and harmful drug interactions in the elderly.      Aspirin is not on active medication list.  Aspirin use is not indicated based on review of current medical condition/s. Risk of harm outweighs potential benefits.  .    Patient Active Problem List   Diagnosis    Benign prostatic hyperplasia with lower urinary tract symptoms    Other male erectile dysfunction    Primary hypertension    Multiple renal cysts    OAB (overactive  "bladder)    Prostate cancer    Hiatal hernia    Gastroesophageal reflux disease without esophagitis    Polycythemia    Irritable bowel syndrome with diarrhea    Liver nodule    Sciatica of left side    Psychogenic fecal incontinence    Insomnia    Non-alcoholic fatty liver disease    Prediabetes    Impotence of organic origin    Ureteric stone    Benign prostatic hyperplasia with urinary obstruction    Acute prostatitis    Chronic kidney disease    Chronic prostatitis    Overactive bladder    Primary erectile dysfunction    Benign hypertension    Malignant tumor of prostate    Abdominal pain    Diarrhea    Encounter for health maintenance examination    Fatigue    High prostate specific antigen (PSA)    History of malignant neoplasm of prostate    Hyperglycemia    Induratio penis plastica    Low back pain    Facial droop    Pain in joint of left shoulder    Pain in joint of right shoulder    Pain in left knee    Partial thickness rotator cuff tear    Right inguinal hernia    Tear of lateral meniscus of knee    Stroke-like symptoms    PELON (acute kidney injury)    Stenosis of right carotid artery    History of malignant neoplasm of skin    CKD stage 3a, GFR 45-59 ml/min    Bronchitis     Advance Care Planning Advance Directive is not on file.  ACP discussion was held with the patient during this visit. Patient does not have an advance directive, information provided.            Objective   Vitals:    12/19/24 0808   BP: 116/74   BP Location: Left arm   Patient Position: Sitting   Cuff Size: Adult   Pulse: 82   Resp: 20   Temp: 98.4 °F (36.9 °C)   TempSrc: Infrared   SpO2: 99%   Weight: 83.9 kg (185 lb)   Height: 177.8 cm (70\")   PainSc: 4  Comment: intermittent   PainLoc: Abdomen  Comment: R side       Estimated body mass index is 26.54 kg/m² as calculated from the following:    Height as of this encounter: 177.8 cm (70\").    Weight as of this encounter: 83.9 kg (185 lb).         Does the patient have evidence of " cognitive impairment? No  Lab Results   Component Value Date    TRIG 220 (H) 2024    HDL 41 2024    LDL 51 2024    VLDL 35 2024    HGBA1C 6.90 (H) 2024                                                                                               Health  Risk Assessment    Smoking Status:  Social History     Tobacco Use   Smoking Status Never    Passive exposure: Never   Smokeless Tobacco Never     Alcohol Consumption:  Social History     Substance and Sexual Activity   Alcohol Use Never       Fall Risk Screen  STEADI Fall Risk Assessment was completed, and patient is at LOW risk for falls.Assessment completed on:2024    Depression Screening   Little interest or pleasure in doing things? Several days   Feeling down, depressed, or hopeless? Almost all   PHQ-2 Total Score 4   Trouble falling or staying asleep, or sleeping too much? Several days   Feeling tired or having little energy? Almost all   Poor appetite or overeating? Not at all   Feeling bad about yourself - or that you are a failure or have let yourself or your family down? Not at all   Trouble concentrating on things, such as reading the newspaper or watching television? Several days   Moving or speaking so slowly that other people could have noticed? Or the opposite - being so fidgety or restless that you have been moving around a lot more than usual? Not at all   Thoughts that you would be better off dead, or of hurting yourself in some way? Not at all   PHQ-9 Total Score 9   If you checked off any problems, how difficult have these problems made it for you to do your work, take care of things at home, or get along with other people? Somewhat difficult      Health Habits and Functional and Cognitive Screenin/19/2024     8:10 AM   Functional & Cognitive Status   Do you have difficulty preparing food and eating? No   Do you have difficulty bathing yourself, getting dressed or grooming yourself? No   Do you have  difficulty using the toilet? No   Do you have difficulty moving around from place to place? No   Do you have trouble with steps or getting out of a bed or a chair? No   Current Diet Limited Junk Food   Dental Exam Up to date   Eye Exam Up to date   Exercise (times per week) 3 times per week   Current Exercises Include Weightlifting;Cardiovascular Workout   Do you need help using the phone?  No   Are you deaf or do you have serious difficulty hearing?  No   Do you need help to go to places out of walking distance? No   Do you need help shopping? No   Do you need help preparing meals?  No   Do you need help with housework?  No   Do you need help with laundry? No   Do you need help taking your medications? No   Do you need help managing money? No   Do you ever drive or ride in a car without wearing a seat belt? No   Have you felt unusual stress, anger or loneliness in the last month? Yes   Who do you live with? Spouse   If you need help, do you have trouble finding someone available to you? No   Have you been bothered in the last four weeks by sexual problems? No   Do you have difficulty concentrating, remembering or making decisions? No           Visual Acuity:  No results found.  Age-appropriate Screening Schedule:  Refer to the list below for future screening recommendations based on patient's age, sex and/or medical conditions. Orders for these recommended tests are listed in the plan section. The patient has been provided with a written plan.    Health Maintenance List  Health Maintenance   Topic Date Due    COVID-19 Vaccine (4 - 2024-25 season) 03/10/2025 (Originally 9/1/2024)    BMI FOLLOWUP  08/29/2025    ANNUAL WELLNESS VISIT  12/19/2025    COLORECTAL CANCER SCREENING  01/30/2030    TDAP/TD VACCINES (2 - Td or Tdap) 03/10/2032    HEPATITIS C SCREENING  Completed    INFLUENZA VACCINE  Completed    Pneumococcal Vaccine 65+  Completed    ZOSTER VACCINE  Completed                                                                                                                                                 Physical exam   RRR   CTAB  No tenderness on abd exam no mass     CMS Preventative Services Quick Reference  Risk Factors Identified During Encounter  None Identified    The above risks/problems have been discussed with the patient.  Pertinent information has been shared with the patient in the After Visit Summary.  An After Visit Summary and PPPS were made available to the patient.    Follow Up:   Next Medicare Wellness visit to be scheduled in 1 year.     Diagnoses and all orders for this visit:    1. Gastroesophageal reflux disease without esophagitis (Primary)  Assessment & Plan:  Nexium 40 BID for 2-3 months then decrease to 20 BID       2. Encounter for routine adult health examination without abnormal findings  -     Cancel: Basic Metabolic Panel; Future  -     CBC & Differential; Future  -     Comprehensive Metabolic Panel; Future    3. Abdominal wall pain    4. Grief    Exam and history consistent with right lateral abdominal wall pain.  Reassuring MRI abdomen earlier this year f/u for worsening or persistent pain     UTD on screening and vaccines     Mild PELON and polycythemia likely related to dehydration and grief I will repeat this in 1 month.  Follow-up for worsening    Annual physical exam was performed in addition to the Medicare wellness visit today we specifically reviewed healthy diet and physical activity, vaccines, screening test.  Wellness handout given.    Follow Up:   Return in about 3 months (around 3/19/2025) for Follow-up.      Evaristo Gaffney MD  [facility]

## 2025-02-03 DIAGNOSIS — R05.2 SUBACUTE COUGH: ICD-10-CM

## 2025-02-03 DIAGNOSIS — K21.9 GASTROESOPHAGEAL REFLUX DISEASE WITHOUT ESOPHAGITIS: ICD-10-CM

## 2025-02-03 RX ORDER — ESOMEPRAZOLE MAGNESIUM 40 MG/1
CAPSULE, DELAYED RELEASE ORAL
Qty: 180 CAPSULE | Refills: 0 | Status: SHIPPED | OUTPATIENT
Start: 2025-02-03

## 2025-03-19 ENCOUNTER — LAB (OUTPATIENT)
Dept: LAB | Facility: HOSPITAL | Age: 75
End: 2025-03-19
Payer: MEDICARE

## 2025-03-19 DIAGNOSIS — Z00.00 ENCOUNTER FOR ROUTINE ADULT HEALTH EXAMINATION WITHOUT ABNORMAL FINDINGS: ICD-10-CM

## 2025-03-19 LAB
ALBUMIN SERPL-MCNC: 4.4 G/DL (ref 3.5–5.2)
ALBUMIN/GLOB SERPL: 1.6 G/DL
ALP SERPL-CCNC: 75 U/L (ref 39–117)
ALT SERPL W P-5'-P-CCNC: 46 U/L (ref 1–41)
ANION GAP SERPL CALCULATED.3IONS-SCNC: 12 MMOL/L (ref 5–15)
AST SERPL-CCNC: 34 U/L (ref 1–40)
BASOPHILS # BLD AUTO: 0.03 10*3/MM3 (ref 0–0.2)
BASOPHILS NFR BLD AUTO: 0.3 % (ref 0–1.5)
BILIRUB SERPL-MCNC: 0.6 MG/DL (ref 0–1.2)
BUN SERPL-MCNC: 19 MG/DL (ref 8–23)
BUN/CREAT SERPL: 14.8 (ref 7–25)
CALCIUM SPEC-SCNC: 10 MG/DL (ref 8.6–10.5)
CHLORIDE SERPL-SCNC: 103 MMOL/L (ref 98–107)
CO2 SERPL-SCNC: 26 MMOL/L (ref 22–29)
CREAT SERPL-MCNC: 1.28 MG/DL (ref 0.76–1.27)
DEPRECATED RDW RBC AUTO: 43.5 FL (ref 37–54)
EGFRCR SERPLBLD CKD-EPI 2021: 59.1 ML/MIN/1.73
EOSINOPHIL # BLD AUTO: 0.19 10*3/MM3 (ref 0–0.4)
EOSINOPHIL NFR BLD AUTO: 2 % (ref 0.3–6.2)
ERYTHROCYTE [DISTWIDTH] IN BLOOD BY AUTOMATED COUNT: 12.3 % (ref 12.3–15.4)
GLOBULIN UR ELPH-MCNC: 2.8 GM/DL
GLUCOSE SERPL-MCNC: 137 MG/DL (ref 65–99)
HCT VFR BLD AUTO: 49.6 % (ref 37.5–51)
HGB BLD-MCNC: 17.2 G/DL (ref 13–17.7)
IMM GRANULOCYTES # BLD AUTO: 0.03 10*3/MM3 (ref 0–0.05)
IMM GRANULOCYTES NFR BLD AUTO: 0.3 % (ref 0–0.5)
LYMPHOCYTES # BLD AUTO: 0.86 10*3/MM3 (ref 0.7–3.1)
LYMPHOCYTES NFR BLD AUTO: 9.2 % (ref 19.6–45.3)
MCH RBC QN AUTO: 33.1 PG (ref 26.6–33)
MCHC RBC AUTO-ENTMCNC: 34.7 G/DL (ref 31.5–35.7)
MCV RBC AUTO: 95.4 FL (ref 79–97)
MONOCYTES # BLD AUTO: 0.67 10*3/MM3 (ref 0.1–0.9)
MONOCYTES NFR BLD AUTO: 7.2 % (ref 5–12)
NEUTROPHILS NFR BLD AUTO: 7.54 10*3/MM3 (ref 1.7–7)
NEUTROPHILS NFR BLD AUTO: 81 % (ref 42.7–76)
NRBC BLD AUTO-RTO: 0 /100 WBC (ref 0–0.2)
PLATELET # BLD AUTO: 152 10*3/MM3 (ref 140–450)
PMV BLD AUTO: 10.8 FL (ref 6–12)
POTASSIUM SERPL-SCNC: 4.3 MMOL/L (ref 3.5–5.2)
PROT SERPL-MCNC: 7.2 G/DL (ref 6–8.5)
RBC # BLD AUTO: 5.2 10*6/MM3 (ref 4.14–5.8)
SODIUM SERPL-SCNC: 141 MMOL/L (ref 136–145)
WBC NRBC COR # BLD AUTO: 9.32 10*3/MM3 (ref 3.4–10.8)

## 2025-03-19 PROCEDURE — 80053 COMPREHEN METABOLIC PANEL: CPT

## 2025-03-19 PROCEDURE — 85025 COMPLETE CBC W/AUTO DIFF WBC: CPT

## 2025-03-24 ENCOUNTER — OFFICE VISIT (OUTPATIENT)
Dept: FAMILY MEDICINE CLINIC | Facility: CLINIC | Age: 75
End: 2025-03-24
Payer: MEDICARE

## 2025-03-24 VITALS
BODY MASS INDEX: 26.11 KG/M2 | WEIGHT: 182.4 LBS | TEMPERATURE: 97.3 F | HEIGHT: 70 IN | OXYGEN SATURATION: 99 % | DIASTOLIC BLOOD PRESSURE: 80 MMHG | HEART RATE: 81 BPM | RESPIRATION RATE: 22 BRPM | SYSTOLIC BLOOD PRESSURE: 106 MMHG

## 2025-03-24 DIAGNOSIS — C61 PROSTATE CANCER: ICD-10-CM

## 2025-03-24 DIAGNOSIS — E11.9 TYPE 2 DIABETES MELLITUS WITHOUT COMPLICATION, WITHOUT LONG-TERM CURRENT USE OF INSULIN: ICD-10-CM

## 2025-03-24 DIAGNOSIS — N41.0 ACUTE PROSTATITIS: ICD-10-CM

## 2025-03-24 DIAGNOSIS — I10 PRIMARY HYPERTENSION: ICD-10-CM

## 2025-03-24 DIAGNOSIS — K21.9 GASTROESOPHAGEAL REFLUX DISEASE WITHOUT ESOPHAGITIS: Primary | ICD-10-CM

## 2025-03-24 LAB
EXPIRATION DATE: ABNORMAL
HBA1C MFR BLD: 6.7 % (ref 4.5–5.7)
Lab: ABNORMAL

## 2025-03-24 PROCEDURE — 1125F AMNT PAIN NOTED PAIN PRSNT: CPT | Performed by: STUDENT IN AN ORGANIZED HEALTH CARE EDUCATION/TRAINING PROGRAM

## 2025-03-24 PROCEDURE — 3044F HG A1C LEVEL LT 7.0%: CPT | Performed by: STUDENT IN AN ORGANIZED HEALTH CARE EDUCATION/TRAINING PROGRAM

## 2025-03-24 PROCEDURE — 3074F SYST BP LT 130 MM HG: CPT | Performed by: STUDENT IN AN ORGANIZED HEALTH CARE EDUCATION/TRAINING PROGRAM

## 2025-03-24 PROCEDURE — 99214 OFFICE O/P EST MOD 30 MIN: CPT | Performed by: STUDENT IN AN ORGANIZED HEALTH CARE EDUCATION/TRAINING PROGRAM

## 2025-03-24 PROCEDURE — 83036 HEMOGLOBIN GLYCOSYLATED A1C: CPT | Performed by: STUDENT IN AN ORGANIZED HEALTH CARE EDUCATION/TRAINING PROGRAM

## 2025-03-24 PROCEDURE — 3079F DIAST BP 80-89 MM HG: CPT | Performed by: STUDENT IN AN ORGANIZED HEALTH CARE EDUCATION/TRAINING PROGRAM

## 2025-03-24 RX ORDER — METHENAMINE, SODIUM PHOSPHATE, MONOBASIC, MONOHYDRATE, PHENYL SALICYLATE, METHYLENE BLUE, AND HYOSCYAMINE SULFATE 118; 40.8; 36; 10; .12 MG/1; MG/1; MG/1; MG/1; MG/1
1 CAPSULE ORAL 4 TIMES DAILY PRN
Qty: 90 CAPSULE | Refills: 5 | Status: SHIPPED | OUTPATIENT
Start: 2025-03-24

## 2025-03-24 RX ORDER — AMPICILLIN TRIHYDRATE 250 MG
500 CAPSULE ORAL DAILY
COMMUNITY

## 2025-03-24 RX ORDER — LISINOPRIL 20 MG/1
20 TABLET ORAL DAILY
Qty: 90 TABLET | Refills: 3 | Status: SHIPPED | OUTPATIENT
Start: 2025-03-24

## 2025-03-24 NOTE — PROGRESS NOTES
Established Patient Office Visit        Subjective      Chief Complaint:  Hypertension (Follow up on hypertension, switching from 40 to 20 for nexium)      History of Present Illness: Ahmet Mariee is a 74 y.o. male who presents for follow-up of reflux and retention.  Reflux is improved.    Very occasional dizziness with doing sit ups      Patient Active Problem List   Diagnosis    Benign prostatic hyperplasia with lower urinary tract symptoms    Other male erectile dysfunction    Primary hypertension    Multiple renal cysts    OAB (overactive bladder)    Prostate cancer    Hiatal hernia    Gastroesophageal reflux disease without esophagitis    Polycythemia    Irritable bowel syndrome with diarrhea    Liver nodule    Sciatica of left side    Psychogenic fecal incontinence    Insomnia    Non-alcoholic fatty liver disease    Type 2 diabetes mellitus without complication, without long-term current use of insulin    Impotence of organic origin    Ureteric stone    Benign prostatic hyperplasia with urinary obstruction    Acute prostatitis    Chronic kidney disease    Chronic prostatitis    Overactive bladder    Primary erectile dysfunction    Benign hypertension    Malignant tumor of prostate    Abdominal pain    Diarrhea    Encounter for health maintenance examination    Fatigue    History of malignant neoplasm of prostate    Hyperglycemia    Induratio penis plastica    Low back pain    Facial droop    Pain in joint of left shoulder    Pain in joint of right shoulder    Pain in left knee    Partial thickness rotator cuff tear    Right inguinal hernia    Tear of lateral meniscus of knee    Stroke-like symptoms    PELON (acute kidney injury)    Stenosis of right carotid artery    History of malignant neoplasm of skin    CKD stage 3a, GFR 45-59 ml/min    Bronchitis         Current Outpatient Medications:     Artificial Tear Ointment (artificial tears) ophthalmic ointment, Administer 1 Application to both eyes Daily.,  Disp: 5 g, Rfl: 0    atorvastatin (LIPITOR) 40 MG tablet, Take 1 tablet by mouth Every Night., Disp: 90 tablet, Rfl: 3    Cinnamon 500 MG capsule, Take 1 capsule by mouth Daily. Salon brand cinnamon, Disp: , Rfl:     hydroCHLOROthiazide 12.5 MG tablet, TAKE 1 TABLET DAILY, Disp: 90 tablet, Rfl: 3    hyoscyamine (ANASPAZ,LEVSIN) 0.125 MG tablet, Take 1 tablet by mouth Every 6 (Six) Hours As Needed for Cramping. (Patient taking differently: Take 1 tablet by mouth 3 (Three) Times a Day With Meals. With meals and at bedtime), Disp: 360 tablet, Rfl: 3    loperamide (IMODIUM A-D) 1 MG/7.5ML oral solution, Take 7.5 mL by mouth 4 (Four) Times a Day As Needed., Disp: , Rfl:     Omega-3 Fatty Acids (fish oil) 1000 MG capsule capsule, Patient takes two a day of this., Disp: , Rfl:     saccharomyces boulardii (FLORASTOR) 250 MG capsule, Take 1 capsule by mouth Daily. [facility] ot daily takes one a day, Disp: , Rfl:     tadalafil (CIALIS) 20 MG tablet, Take 1 tablet by mouth Daily. Take one as needed, Disp: , Rfl:     tadalafil (CIALIS) 5 MG tablet, Take 1 tablet by mouth Daily. Take one tablet by mouth daily, Disp: , Rfl:     traZODone (DESYREL) 50 MG tablet, TAKE 1 TO 2 TABLETS NIGHTLY as needed for sleep, Disp: 180 tablet, Rfl: 3    triamcinolone (KENALOG) 0.1 % ointment, , Disp: , Rfl:     uribel (URO-MP) 118 MG capsule capsule, Take 1 capsule by mouth 4 (Four) Times a Day As Needed (dysuria)., Disp: 90 capsule, Rfl: 5    azithromycin (ZITHROMAX) 250 MG tablet, Take 2 tablets the first day, then 1 tablet daily for 4 days. (Patient not taking: Reported on 3/24/2025), Disp: 6 tablet, Rfl: 0    esomeprazole (nexIUM) 20 MG capsule, Take 1 capsule by mouth 2 (Two) Times a Day., Disp: 180 capsule, Rfl: 3    guaifenesin (ROBITUSSIN) 100 MG/5ML liquid, Take 10 mL by mouth 3 (Three) Times a Day As Needed for Cough. (Patient not taking: Reported on 3/24/2025), Disp: 180 mL, Rfl: 0    lisinopril (PRINIVIL,ZESTRIL) 20 MG tablet, Take 1  "tablet by mouth Daily., Disp: 90 tablet, Rfl: 3    Promethazine-Phenyleph-Codeine 6.25-5-10 MG/5ML syrup syrup, Take 5 mL by mouth Every 6 (Six) Hours As Needed (cough). (Patient not taking: Reported on 3/24/2025), Disp: 118 mL, Rfl: 0       Objective     Physical Exam:   Vital Signs:   /80 (BP Location: Left arm, Patient Position: Sitting, Cuff Size: Adult)   Pulse 81   Temp 97.3 °F (36.3 °C) (Temporal)   Resp 22   Ht 177.8 cm (70\")   Wt 82.7 kg (182 lb 6.4 oz)   SpO2 99%   BMI 26.17 kg/m²      Physical Exam  Constitutional:       General: He is not in acute distress.     Appearance: He is not ill-appearing.   Cardiovascular:      Rate and Rhythm: Normal rate and regular rhythm.   Pulmonary:      Effort: Pulmonary effort is normal.      Breath sounds: Normal breath sounds.   Neurological:      Mental Status: He is alert.   Psychiatric:         Thought Content: Thought content normal.            Assessment / Plan      Diagnoses and all orders for this visit:    1. Gastroesophageal reflux disease without esophagitis (Primary)  -     esomeprazole (nexIUM) 20 MG capsule; Take 1 capsule by mouth 2 (Two) Times a Day.  Dispense: 180 capsule; Refill: 3    2. Prostate cancer  Overview:  Reassuring MRI 2025   Keep f/u with urology       3. Primary hypertension  -     lisinopril (PRINIVIL,ZESTRIL) 20 MG tablet; Take 1 tablet by mouth Daily.  Dispense: 90 tablet; Refill: 3    4. Acute prostatitis  -     uribel (URO-MP) 118 MG capsule capsule; Take 1 capsule by mouth 4 (Four) Times a Day As Needed (dysuria).  Dispense: 90 capsule; Refill: 5    5. Type 2 diabetes mellitus without complication, without long-term current use of insulin  Assessment & Plan:  Desires diet control A1c slightly downtrending to 6.7.  Follow-up in 3 months  Discussed healthy diet  Can discuss eye exam and urine micro on follow-up    Orders:  -     POC Glycosylated Hemoglobin (Hb A1C)                 Follow Up:   Return in about 3 months " (around 6/24/2025) for Follow-up DM II .    MDM:     Evaristo Gaffney MD  Family Medicine - [facility]

## 2025-03-24 NOTE — ASSESSMENT & PLAN NOTE
Desires diet control A1c slightly downtrending to 6.7.  Follow-up in 3 months  Discussed healthy diet  Can discuss eye exam and urine micro on follow-up

## 2025-03-31 DIAGNOSIS — G47.09 OTHER INSOMNIA: ICD-10-CM

## 2025-03-31 RX ORDER — TRAZODONE HYDROCHLORIDE 50 MG/1
TABLET ORAL
Qty: 180 TABLET | Refills: 3 | Status: SHIPPED | OUTPATIENT
Start: 2025-03-31

## 2025-03-31 NOTE — TELEPHONE ENCOUNTER
Rx Refill Note  Requested Prescriptions     Pending Prescriptions Disp Refills    traZODone (DESYREL) 50 MG tablet [Pharmacy Med Name: TRAZODONE HCL TABS 50MG] 180 tablet 3     Sig: TAKE 1 TO 2 TABLETS NIGHTLY AS NEEDED FOR SLEEP      Last office visit with prescribing clinician: 3/24/2025   Last telemedicine visit with prescribing clinician: Visit date not found   Next office visit with prescribing clinician: 6/24/2025                         Would you like a call back once the refill request has been completed: [] Yes [] No    If the office needs to give you a call back, can they leave a voicemail: [] Yes [] No    Sandra Perez MA  03/31/25, 09:04 EDT

## 2025-05-30 ENCOUNTER — TELEPHONE (OUTPATIENT)
Dept: FAMILY MEDICINE CLINIC | Facility: CLINIC | Age: 75
End: 2025-05-30

## 2025-05-30 NOTE — TELEPHONE ENCOUNTER
Patient informing of retinal dettachment, treated and stable. Spoke with patient and advised would inform provider upon his return.

## 2025-05-30 NOTE — TELEPHONE ENCOUNTER
Caller: Ahmet Mariee    Relationship: Self    Best call back number: [phone]    What is the best time to reach you: ANY    Who are you requesting to speak with (clinical staff, provider,  specific staff member):     CLINICAL - NURSE    What was the call regarding:     DETACHED RETINA    PLEASE HAVE NURSE CALL PATIENT

## 2025-06-02 PROBLEM — H33.22 LEFT RETINAL DETACHMENT: Status: ACTIVE | Noted: 2025-06-02

## 2025-06-24 ENCOUNTER — OFFICE VISIT (OUTPATIENT)
Dept: FAMILY MEDICINE CLINIC | Facility: CLINIC | Age: 75
End: 2025-06-24
Payer: MEDICARE

## 2025-06-24 VITALS
SYSTOLIC BLOOD PRESSURE: 118 MMHG | OXYGEN SATURATION: 97 % | HEIGHT: 70 IN | TEMPERATURE: 98.4 F | HEART RATE: 69 BPM | BODY MASS INDEX: 24.54 KG/M2 | DIASTOLIC BLOOD PRESSURE: 70 MMHG | WEIGHT: 171.4 LBS | RESPIRATION RATE: 18 BRPM

## 2025-06-24 DIAGNOSIS — H33.21 RIGHT RETINAL DETACHMENT: ICD-10-CM

## 2025-06-24 DIAGNOSIS — N18.31 CKD STAGE 3A, GFR 45-59 ML/MIN: ICD-10-CM

## 2025-06-24 DIAGNOSIS — C61 PROSTATE CANCER: ICD-10-CM

## 2025-06-24 DIAGNOSIS — E11.9 TYPE 2 DIABETES MELLITUS WITHOUT COMPLICATION, WITHOUT LONG-TERM CURRENT USE OF INSULIN: ICD-10-CM

## 2025-06-24 PROBLEM — M25.562 PAIN IN LEFT KNEE: Status: RESOLVED | Noted: 2020-01-15 | Resolved: 2025-06-24

## 2025-06-24 PROBLEM — R29.90 STROKE-LIKE SYMPTOMS: Status: RESOLVED | Noted: 2024-04-18 | Resolved: 2025-06-24

## 2025-06-24 PROBLEM — M25.511 PAIN IN JOINT OF RIGHT SHOULDER: Status: RESOLVED | Noted: 2020-02-06 | Resolved: 2025-06-24

## 2025-06-24 PROBLEM — M25.512 PAIN IN JOINT OF LEFT SHOULDER: Status: RESOLVED | Noted: 2022-01-05 | Resolved: 2025-06-24

## 2025-06-24 PROBLEM — R53.83 FATIGUE: Status: RESOLVED | Noted: 2018-10-09 | Resolved: 2025-06-24

## 2025-06-24 PROBLEM — N17.9 AKI (ACUTE KIDNEY INJURY): Status: RESOLVED | Noted: 2024-04-18 | Resolved: 2025-06-24

## 2025-06-24 LAB
EXPIRATION DATE: ABNORMAL
HBA1C MFR BLD: 6 % (ref 4.5–5.7)
Lab: ABNORMAL

## 2025-06-24 NOTE — PROGRESS NOTES
Established Patient Office Visit        Subjective      Chief Complaint:  Diabetes      History of Present Illness: Ahmet Mariee is a 74 y.o. male who presents for DM II.     HE is eating better and exercising more.     He had retinal detachment and had repair pneumatic retinopexy 5/25/25.       Patient Active Problem List   Diagnosis    Benign prostatic hyperplasia with lower urinary tract symptoms    Other male erectile dysfunction    Primary hypertension    Multiple renal cysts    OAB (overactive bladder)    Prostate cancer    Hiatal hernia    Gastroesophageal reflux disease without esophagitis    Polycythemia    Irritable bowel syndrome with diarrhea    Liver nodule    Sciatica of left side    Psychogenic fecal incontinence    Insomnia    Non-alcoholic fatty liver disease    Type 2 diabetes mellitus without complication, without long-term current use of insulin    Impotence of organic origin    Ureteric stone    Benign prostatic hyperplasia with urinary obstruction    Acute prostatitis    Chronic kidney disease    Chronic prostatitis    Overactive bladder    Primary erectile dysfunction    Benign hypertension    Malignant tumor of prostate    Abdominal pain    Diarrhea    Encounter for health maintenance examination    History of malignant neoplasm of prostate    Hyperglycemia    Induratio penis plastica    Low back pain    Facial droop    Partial thickness rotator cuff tear    Right inguinal hernia    Tear of lateral meniscus of knee    Stenosis of right carotid artery    History of malignant neoplasm of skin    CKD stage 3a, GFR 45-59 ml/min    Bronchitis    Left retinal detachment    Right retinal detachment         Current Outpatient Medications:     atorvastatin (LIPITOR) 40 MG tablet, Take 1 tablet by mouth Every Night., Disp: 90 tablet, Rfl: 3    Cinnamon 500 MG capsule, Take 1 capsule by mouth Daily. Salon brand cinnamon, Disp: , Rfl:     esomeprazole (nexIUM) 20 MG capsule, Take 1 capsule by  "mouth 2 (Two) Times a Day., Disp: 180 capsule, Rfl: 3    hydroCHLOROthiazide 12.5 MG tablet, TAKE 1 TABLET DAILY, Disp: 90 tablet, Rfl: 3    hyoscyamine (ANASPAZ,LEVSIN) 0.125 MG tablet, Take 1 tablet by mouth Every 6 (Six) Hours As Needed for Cramping. (Patient taking differently: Take 1 tablet by mouth 3 (Three) Times a Day With Meals. With meals and at bedtime), Disp: 360 tablet, Rfl: 3    lisinopril (PRINIVIL,ZESTRIL) 20 MG tablet, Take 1 tablet by mouth Daily., Disp: 90 tablet, Rfl: 3    loperamide (IMODIUM A-D) 1 MG/7.5ML oral solution, Take 7.5 mL by mouth 4 (Four) Times a Day As Needed., Disp: , Rfl:     Omega-3 Fatty Acids (fish oil) 1000 MG capsule capsule, Patient takes two a day of this., Disp: , Rfl:     saccharomyces boulardii (FLORASTOR) 250 MG capsule, Take 1 capsule by mouth Daily. [facility] ot daily takes one a day, Disp: , Rfl:     tadalafil (CIALIS) 20 MG tablet, Take 1 tablet by mouth Daily. Take one as needed, Disp: , Rfl:     tadalafil (CIALIS) 5 MG tablet, Take 1 tablet by mouth Daily. Take one tablet by mouth daily, Disp: , Rfl:     traZODone (DESYREL) 50 MG tablet, TAKE 1 TO 2 TABLETS NIGHTLY AS NEEDED FOR SLEEP, Disp: 180 tablet, Rfl: 3    triamcinolone (KENALOG) 0.1 % ointment, , Disp: , Rfl:     uribel (URO-MP) 118 MG capsule capsule, Take 1 capsule by mouth 4 (Four) Times a Day As Needed (dysuria)., Disp: 90 capsule, Rfl: 5       Objective     Physical Exam:   Vital Signs:   /70   Pulse 69   Temp 98.4 °F (36.9 °C) (Temporal)   Resp 18   Ht 177.8 cm (70\")   Wt 77.7 kg (171 lb 6.4 oz)   SpO2 97%   BMI 24.59 kg/m²      Physical Exam  Constitutional:       General: He is not in acute distress.     Appearance: He is not ill-appearing.   Cardiovascular:      Rate and Rhythm: Normal rate and regular rhythm.   Pulmonary:      Effort: Pulmonary effort is normal.      Breath sounds: Normal breath sounds.   Neurological:      Mental Status: He is alert.   Psychiatric:         Thought " Content: Thought content normal.   Abdomen soft nontender         Assessment / Plan      Assessment/Plan:   Diagnoses and all orders for this visit:    1. Type 2 diabetes mellitus without complication, without long-term current use of insulin  Assessment & Plan:  Improved. Continue healthy diet   Check urine micro on f/u       Orders:  -     POC Glycosylated Hemoglobin (Hb A1C)    2. CKD stage 3a, GFR 45-59 ml/min  Assessment & Plan:  Avoid NSAIDs stable continue lisinopril      3. Right retinal detachment  Assessment & Plan:  Keep f/u with optho.       4. Prostate cancer  Overview:  Reassuring MRI 2025 no treatment monitoring   Keep f/u with urology              Follow Up:   Return in about 3 months (around 9/24/2025) for Follow-up poc a1c .    MDM:     Evaristo Gaffney MD  Family Medicine - [facility]

## 2025-06-30 RX ORDER — ATORVASTATIN CALCIUM 40 MG/1
40 TABLET, FILM COATED ORAL NIGHTLY
Qty: 90 TABLET | Refills: 0 | Status: SHIPPED | OUTPATIENT
Start: 2025-06-30

## 2025-08-05 ENCOUNTER — OFFICE VISIT (OUTPATIENT)
Dept: FAMILY MEDICINE CLINIC | Facility: CLINIC | Age: 75
End: 2025-08-05
Payer: MEDICARE

## 2025-08-05 VITALS
BODY MASS INDEX: 24.85 KG/M2 | HEIGHT: 70 IN | DIASTOLIC BLOOD PRESSURE: 78 MMHG | OXYGEN SATURATION: 100 % | TEMPERATURE: 98.4 F | WEIGHT: 173.6 LBS | HEART RATE: 67 BPM | SYSTOLIC BLOOD PRESSURE: 116 MMHG

## 2025-08-05 DIAGNOSIS — M54.50 ACUTE RIGHT-SIDED LOW BACK PAIN WITHOUT SCIATICA: Primary | ICD-10-CM

## 2025-08-05 PROCEDURE — 3078F DIAST BP <80 MM HG: CPT | Performed by: STUDENT IN AN ORGANIZED HEALTH CARE EDUCATION/TRAINING PROGRAM

## 2025-08-05 PROCEDURE — 3074F SYST BP LT 130 MM HG: CPT | Performed by: STUDENT IN AN ORGANIZED HEALTH CARE EDUCATION/TRAINING PROGRAM

## 2025-08-05 PROCEDURE — G2211 COMPLEX E/M VISIT ADD ON: HCPCS | Performed by: STUDENT IN AN ORGANIZED HEALTH CARE EDUCATION/TRAINING PROGRAM

## 2025-08-05 PROCEDURE — 3044F HG A1C LEVEL LT 7.0%: CPT | Performed by: STUDENT IN AN ORGANIZED HEALTH CARE EDUCATION/TRAINING PROGRAM

## 2025-08-05 PROCEDURE — 1125F AMNT PAIN NOTED PAIN PRSNT: CPT | Performed by: STUDENT IN AN ORGANIZED HEALTH CARE EDUCATION/TRAINING PROGRAM

## 2025-08-05 PROCEDURE — 99213 OFFICE O/P EST LOW 20 MIN: CPT | Performed by: STUDENT IN AN ORGANIZED HEALTH CARE EDUCATION/TRAINING PROGRAM

## 2025-08-05 RX ORDER — METHOCARBAMOL 750 MG/1
750 TABLET, FILM COATED ORAL 3 TIMES DAILY PRN
Qty: 90 TABLET | Refills: 1 | Status: SHIPPED | OUTPATIENT
Start: 2025-08-05